# Patient Record
Sex: FEMALE | Race: WHITE | NOT HISPANIC OR LATINO | Employment: FULL TIME | URBAN - METROPOLITAN AREA
[De-identification: names, ages, dates, MRNs, and addresses within clinical notes are randomized per-mention and may not be internally consistent; named-entity substitution may affect disease eponyms.]

---

## 2017-03-31 ENCOUNTER — APPOINTMENT (OUTPATIENT)
Dept: GENERAL RADIOLOGY | Facility: HOSPITAL | Age: 67
End: 2017-03-31

## 2017-03-31 ENCOUNTER — APPOINTMENT (OUTPATIENT)
Dept: CARDIOLOGY | Facility: HOSPITAL | Age: 67
End: 2017-03-31
Attending: INTERNAL MEDICINE

## 2017-03-31 ENCOUNTER — HOSPITAL ENCOUNTER (INPATIENT)
Facility: HOSPITAL | Age: 67
LOS: 18 days | Discharge: HOME HEALTH | End: 2017-04-18
Attending: EMERGENCY MEDICINE | Admitting: INTERNAL MEDICINE

## 2017-03-31 ENCOUNTER — APPOINTMENT (OUTPATIENT)
Dept: CT IMAGING | Facility: HOSPITAL | Age: 67
End: 2017-03-31

## 2017-03-31 ENCOUNTER — APPOINTMENT (OUTPATIENT)
Dept: GENERAL RADIOLOGY | Facility: HOSPITAL | Age: 67
End: 2017-03-31
Attending: INTERNAL MEDICINE

## 2017-03-31 DIAGNOSIS — A41.9 SEPSIS, DUE TO UNSPECIFIED ORGANISM: Primary | ICD-10-CM

## 2017-03-31 DIAGNOSIS — R09.02 HYPOXIA: ICD-10-CM

## 2017-03-31 DIAGNOSIS — R53.1 GENERALIZED WEAKNESS: ICD-10-CM

## 2017-03-31 DIAGNOSIS — J96.02 ACUTE RESPIRATORY FAILURE WITH HYPOXIA AND HYPERCAPNIA (HCC): ICD-10-CM

## 2017-03-31 DIAGNOSIS — J18.9 PNEUMONIA OF RIGHT LOWER LOBE DUE TO INFECTIOUS ORGANISM: ICD-10-CM

## 2017-03-31 DIAGNOSIS — Q76.1 CERVICAL FUSION SYNDROME: ICD-10-CM

## 2017-03-31 DIAGNOSIS — D72.829 LEUKOCYTOSIS, UNSPECIFIED TYPE: ICD-10-CM

## 2017-03-31 DIAGNOSIS — J96.01 ACUTE RESPIRATORY FAILURE WITH HYPOXIA AND HYPERCAPNIA (HCC): ICD-10-CM

## 2017-03-31 LAB
ALBUMIN SERPL-MCNC: 3.5 G/DL (ref 3.5–5.2)
ALBUMIN/GLOB SERPL: 0.7 G/DL
ALP SERPL-CCNC: 113 U/L (ref 39–117)
ALT SERPL W P-5'-P-CCNC: 17 U/L (ref 1–33)
ANION GAP SERPL CALCULATED.3IONS-SCNC: 19.5 MMOL/L
ARTERIAL PATENCY WRIST A: ABNORMAL
ARTERIAL PATENCY WRIST A: ABNORMAL
ARTERIAL PATENCY WRIST A: POSITIVE
AST SERPL-CCNC: 29 U/L (ref 1–32)
ATMOSPHERIC PRESS: 742.2 MMHG
ATMOSPHERIC PRESS: 747.9 MMHG
ATMOSPHERIC PRESS: 748.7 MMHG
B PERT DNA SPEC QL NAA+PROBE: NOT DETECTED
BACTERIA UR QL AUTO: ABNORMAL /HPF
BASE EXCESS BLDA CALC-SCNC: -2 MMOL/L (ref 0–2)
BASE EXCESS BLDA CALC-SCNC: -6.5 MMOL/L (ref 0–2)
BASE EXCESS BLDA CALC-SCNC: -9.3 MMOL/L (ref 0–2)
BDY SITE: ABNORMAL
BILIRUB SERPL-MCNC: 1 MG/DL (ref 0.1–1.2)
BILIRUB UR QL STRIP: NEGATIVE
BUN BLD-MCNC: 27 MG/DL (ref 8–23)
BUN/CREAT SERPL: 22.5 (ref 7–25)
BURR CELLS BLD QL SMEAR: ABNORMAL
C PNEUM DNA NPH QL NAA+NON-PROBE: NOT DETECTED
CALCIUM SPEC-SCNC: 9.4 MG/DL (ref 8.6–10.5)
CHLORIDE SERPL-SCNC: 86 MMOL/L (ref 98–107)
CLARITY UR: CLEAR
CO2 SERPL-SCNC: 24.5 MMOL/L (ref 22–29)
COLOR UR: YELLOW
CORTIS SERPL-MCNC: 46.07 MCG/DL
CREAT BLD-MCNC: 1.2 MG/DL (ref 0.57–1)
D-LACTATE SERPL-SCNC: 2.3 MMOL/L (ref 0.5–2)
D-LACTATE SERPL-SCNC: 2.6 MMOL/L (ref 0.5–2)
D-LACTATE SERPL-SCNC: 5.1 MMOL/L (ref 0.5–2)
DEPRECATED RDW RBC AUTO: 46.4 FL (ref 37–54)
ERYTHROCYTE [DISTWIDTH] IN BLOOD BY AUTOMATED COUNT: 15 % (ref 11.7–13)
FLUAV H1 2009 PAND RNA NPH QL NAA+PROBE: NOT DETECTED
FLUAV H1 HA GENE NPH QL NAA+PROBE: NOT DETECTED
FLUAV H3 RNA NPH QL NAA+PROBE: NOT DETECTED
FLUAV SUBTYP SPEC NAA+PROBE: NOT DETECTED
FLUBV RNA ISLT QL NAA+PROBE: NOT DETECTED
GFR SERPL CREATININE-BSD FRML MDRD: 45 ML/MIN/1.73
GLOBULIN UR ELPH-MCNC: 4.7 GM/DL
GLUCOSE BLD-MCNC: 186 MG/DL (ref 65–99)
GLUCOSE BLDC GLUCOMTR-MCNC: 158 MG/DL (ref 70–130)
GLUCOSE BLDC GLUCOMTR-MCNC: 171 MG/DL (ref 70–130)
GLUCOSE UR STRIP-MCNC: NEGATIVE MG/DL
HADV DNA SPEC NAA+PROBE: NOT DETECTED
HCO3 BLDA-SCNC: 18.9 MMOL/L (ref 22–28)
HCO3 BLDA-SCNC: 19.1 MMOL/L (ref 22–28)
HCO3 BLDA-SCNC: 28.4 MMOL/L (ref 22–28)
HCOV 229E RNA SPEC QL NAA+PROBE: NOT DETECTED
HCOV HKU1 RNA SPEC QL NAA+PROBE: NOT DETECTED
HCOV NL63 RNA SPEC QL NAA+PROBE: NOT DETECTED
HCOV OC43 RNA SPEC QL NAA+PROBE: NOT DETECTED
HCT VFR BLD AUTO: 41.9 % (ref 35.6–45.5)
HGB BLD-MCNC: 13.1 G/DL (ref 11.9–15.5)
HGB UR QL STRIP.AUTO: ABNORMAL
HMPV RNA NPH QL NAA+NON-PROBE: NOT DETECTED
HOLD SPECIMEN: NORMAL
HOROWITZ INDEX BLD+IHG-RTO: 100 %
HPIV1 RNA SPEC QL NAA+PROBE: NOT DETECTED
HPIV2 RNA SPEC QL NAA+PROBE: NOT DETECTED
HPIV3 RNA NPH QL NAA+PROBE: NOT DETECTED
HPIV4 P GENE NPH QL NAA+PROBE: NOT DETECTED
HYALINE CASTS UR QL AUTO: ABNORMAL /LPF
KETONES UR QL STRIP: NEGATIVE
L PNEUMO1 AG UR QL IA: NEGATIVE
LEUKOCYTE ESTERASE UR QL STRIP.AUTO: NEGATIVE
LYMPHOCYTES # BLD MANUAL: 1.56 10*3/MM3 (ref 0.9–4.8)
LYMPHOCYTES NFR BLD MANUAL: 4 % (ref 19.6–45.3)
LYMPHOCYTES NFR BLD MANUAL: 6 % (ref 5–12)
M PNEUMO IGG SER IA-ACNC: NOT DETECTED
MCH RBC QN AUTO: 26.4 PG (ref 26.9–32)
MCHC RBC AUTO-ENTMCNC: 31.3 G/DL (ref 32.4–36.3)
MCV RBC AUTO: 84.3 FL (ref 80.5–98.2)
MODALITY: ABNORMAL
MONOCYTES # BLD AUTO: 2.34 10*3/MM3 (ref 0.2–1.2)
NEUTROPHILS # BLD AUTO: 35.03 10*3/MM3 (ref 1.9–8.1)
NEUTROPHILS NFR BLD MANUAL: 90 % (ref 42.7–76)
NITRITE UR QL STRIP: NEGATIVE
NT-PROBNP SERPL-MCNC: 2506 PG/ML (ref 0–900)
O2 A-A PPRESDIFF RESPIRATORY: 0.1 MMHG
O2 A-A PPRESDIFF RESPIRATORY: 0.1 MMHG
O2 A-A PPRESDIFF RESPIRATORY: 0.3 MMHG
PCO2 BLDA: 37.3 MM HG (ref 35–45)
PCO2 BLDA: 48.1 MM HG (ref 35–45)
PCO2 BLDA: 73.4 MM HG (ref 35–45)
PEEP RESPIRATORY: 10 CM[H2O]
PEEP RESPIRATORY: 5 CM[H2O]
PH BLDA: 7.2 PH UNITS (ref 7.35–7.45)
PH BLDA: 7.2 PH UNITS (ref 7.35–7.45)
PH BLDA: 7.32 PH UNITS (ref 7.35–7.45)
PH UR STRIP.AUTO: <=5 [PH] (ref 5–8)
PLAT MORPH BLD: NORMAL
PLATELET # BLD AUTO: 261 10*3/MM3 (ref 140–500)
PMV BLD AUTO: 10.9 FL (ref 6–12)
PO2 BLDA: 209 MM HG (ref 80–100)
PO2 BLDA: 58.2 MM HG (ref 80–100)
PO2 BLDA: 65.9 MM HG (ref 80–100)
POTASSIUM BLD-SCNC: 3.5 MMOL/L (ref 3.5–5.2)
PROCALCITONIN SERPL-MCNC: 13.78 NG/ML (ref 0.1–0.25)
PROT SERPL-MCNC: 8.2 G/DL (ref 6–8.5)
PROT UR QL STRIP: ABNORMAL
RBC # BLD AUTO: 4.97 10*6/MM3 (ref 3.9–5.2)
RBC # UR: ABNORMAL /HPF
REF LAB TEST METHOD: ABNORMAL
RHINOVIRUS RNA SPEC NAA+PROBE: NOT DETECTED
RSV RNA NPH QL NAA+NON-PROBE: NOT DETECTED
S PNEUM AG SPEC QL LA: NEGATIVE
SAO2 % BLDCOA: 83.1 % (ref 92–99)
SAO2 % BLDCOA: 86.4 % (ref 92–99)
SAO2 % BLDCOA: 99.7 % (ref 92–99)
SCAN SLIDE: NORMAL
SET MECH RESP RATE: 30
SET MECH RESP RATE: 32
SODIUM BLD-SCNC: 130 MMOL/L (ref 136–145)
SP GR UR STRIP: >=1.03 (ref 1–1.03)
SQUAMOUS #/AREA URNS HPF: ABNORMAL /HPF
TOTAL RATE: 18 BREATHS/MINUTE
TOTAL RATE: 30 BREATHS/MINUTE
TOTAL RATE: 32 BREATHS/MINUTE
TROPONIN T SERPL-MCNC: 0.02 NG/ML (ref 0–0.03)
TROPONIN T SERPL-MCNC: 0.06 NG/ML (ref 0–0.03)
UROBILINOGEN UR QL STRIP: ABNORMAL
VENTILATOR MODE: ABNORMAL
VENTILATOR MODE: AC
VT ON VENT VENT: 320 ML
VT ON VENT VENT: 320 ML
WBC MORPH BLD: NORMAL
WBC NRBC COR # BLD: 38.92 10*3/MM3 (ref 4.5–10.7)
WBC UR QL AUTO: ABNORMAL /HPF
WHOLE BLOOD HOLD SPECIMEN: NORMAL

## 2017-03-31 PROCEDURE — 36600 WITHDRAWAL OF ARTERIAL BLOOD: CPT

## 2017-03-31 PROCEDURE — 93005 ELECTROCARDIOGRAM TRACING: CPT | Performed by: EMERGENCY MEDICINE

## 2017-03-31 PROCEDURE — 71010 HC CHEST PA OR AP: CPT

## 2017-03-31 PROCEDURE — 0BH17EZ INSERTION OF ENDOTRACHEAL AIRWAY INTO TRACHEA, VIA NATURAL OR ARTIFICIAL OPENING: ICD-10-PCS | Performed by: INTERNAL MEDICINE

## 2017-03-31 PROCEDURE — 87486 CHLMYD PNEUM DNA AMP PROBE: CPT | Performed by: INTERNAL MEDICINE

## 2017-03-31 PROCEDURE — 36415 COLL VENOUS BLD VENIPUNCTURE: CPT | Performed by: EMERGENCY MEDICINE

## 2017-03-31 PROCEDURE — 71250 CT THORAX DX C-: CPT

## 2017-03-31 PROCEDURE — 25010000002 PROPOFOL 10 MG/ML EMULSION

## 2017-03-31 PROCEDURE — 87040 BLOOD CULTURE FOR BACTERIA: CPT | Performed by: EMERGENCY MEDICINE

## 2017-03-31 PROCEDURE — 80053 COMPREHEN METABOLIC PANEL: CPT | Performed by: EMERGENCY MEDICINE

## 2017-03-31 PROCEDURE — 02HV33Z INSERTION OF INFUSION DEVICE INTO SUPERIOR VENA CAVA, PERCUTANEOUS APPROACH: ICD-10-PCS | Performed by: INTERNAL MEDICINE

## 2017-03-31 PROCEDURE — 94799 UNLISTED PULMONARY SVC/PX: CPT

## 2017-03-31 PROCEDURE — 94002 VENT MGMT INPAT INIT DAY: CPT

## 2017-03-31 PROCEDURE — 25010000003 CEFTRIAXONE PER 250 MG: Performed by: EMERGENCY MEDICINE

## 2017-03-31 PROCEDURE — 81001 URINALYSIS AUTO W/SCOPE: CPT | Performed by: INTERNAL MEDICINE

## 2017-03-31 PROCEDURE — 99285 EMERGENCY DEPT VISIT HI MDM: CPT

## 2017-03-31 PROCEDURE — 83605 ASSAY OF LACTIC ACID: CPT | Performed by: EMERGENCY MEDICINE

## 2017-03-31 PROCEDURE — 94660 CPAP INITIATION&MGMT: CPT

## 2017-03-31 PROCEDURE — 82803 BLOOD GASES ANY COMBINATION: CPT

## 2017-03-31 PROCEDURE — 85007 BL SMEAR W/DIFF WBC COUNT: CPT | Performed by: EMERGENCY MEDICINE

## 2017-03-31 PROCEDURE — 93306 TTE W/DOPPLER COMPLETE: CPT | Performed by: INTERNAL MEDICINE

## 2017-03-31 PROCEDURE — 82962 GLUCOSE BLOOD TEST: CPT

## 2017-03-31 PROCEDURE — 82533 TOTAL CORTISOL: CPT | Performed by: INTERNAL MEDICINE

## 2017-03-31 PROCEDURE — 25010000002 FENTANYL CITRATE (PF) 100 MCG/2ML SOLUTION

## 2017-03-31 PROCEDURE — 87449 NOS EACH ORGANISM AG IA: CPT | Performed by: INTERNAL MEDICINE

## 2017-03-31 PROCEDURE — 85025 COMPLETE CBC W/AUTO DIFF WBC: CPT | Performed by: EMERGENCY MEDICINE

## 2017-03-31 PROCEDURE — 84484 ASSAY OF TROPONIN QUANT: CPT | Performed by: EMERGENCY MEDICINE

## 2017-03-31 PROCEDURE — 0399T HC MYOCARDL STRAIN IMAG QUAN ASSMT PER SESS: CPT

## 2017-03-31 PROCEDURE — 93010 ELECTROCARDIOGRAM REPORT: CPT | Performed by: INTERNAL MEDICINE

## 2017-03-31 PROCEDURE — 84484 ASSAY OF TROPONIN QUANT: CPT | Performed by: INTERNAL MEDICINE

## 2017-03-31 PROCEDURE — 25010000002 VANCOMYCIN: Performed by: INTERNAL MEDICINE

## 2017-03-31 PROCEDURE — 84145 PROCALCITONIN (PCT): CPT | Performed by: INTERNAL MEDICINE

## 2017-03-31 PROCEDURE — B548ZZA ULTRASONOGRAPHY OF SUPERIOR VENA CAVA, GUIDANCE: ICD-10-PCS | Performed by: INTERNAL MEDICINE

## 2017-03-31 PROCEDURE — 87081 CULTURE SCREEN ONLY: CPT | Performed by: INTERNAL MEDICINE

## 2017-03-31 PROCEDURE — 83605 ASSAY OF LACTIC ACID: CPT | Performed by: INTERNAL MEDICINE

## 2017-03-31 PROCEDURE — 87633 RESP VIRUS 12-25 TARGETS: CPT | Performed by: INTERNAL MEDICINE

## 2017-03-31 PROCEDURE — 0399T ADULT TRANSTHORACIC ECHO COMPLETE: CPT | Performed by: INTERNAL MEDICINE

## 2017-03-31 PROCEDURE — 83880 ASSAY OF NATRIURETIC PEPTIDE: CPT | Performed by: EMERGENCY MEDICINE

## 2017-03-31 PROCEDURE — 87581 M.PNEUMON DNA AMP PROBE: CPT | Performed by: INTERNAL MEDICINE

## 2017-03-31 PROCEDURE — 25010000002 PIPERACILLIN SOD-TAZOBACTAM PER 1 G: Performed by: INTERNAL MEDICINE

## 2017-03-31 PROCEDURE — 93306 TTE W/DOPPLER COMPLETE: CPT

## 2017-03-31 PROCEDURE — 5A1955Z RESPIRATORY VENTILATION, GREATER THAN 96 CONSECUTIVE HOURS: ICD-10-PCS | Performed by: INTERNAL MEDICINE

## 2017-03-31 PROCEDURE — 94640 AIRWAY INHALATION TREATMENT: CPT

## 2017-03-31 PROCEDURE — 87798 DETECT AGENT NOS DNA AMP: CPT | Performed by: INTERNAL MEDICINE

## 2017-03-31 PROCEDURE — 87899 AGENT NOS ASSAY W/OPTIC: CPT | Performed by: INTERNAL MEDICINE

## 2017-03-31 PROCEDURE — 99223 1ST HOSP IP/OBS HIGH 75: CPT | Performed by: INTERNAL MEDICINE

## 2017-03-31 PROCEDURE — 25010000002 ENOXAPARIN PER 10 MG: Performed by: INTERNAL MEDICINE

## 2017-03-31 RX ORDER — CEFTRIAXONE SODIUM 1 G/50ML
1 INJECTION, SOLUTION INTRAVENOUS ONCE
Status: COMPLETED | OUTPATIENT
Start: 2017-03-31 | End: 2017-03-31

## 2017-03-31 RX ORDER — MAGNESIUM SULFATE HEPTAHYDRATE 40 MG/ML
2 INJECTION, SOLUTION INTRAVENOUS AS NEEDED
Status: DISCONTINUED | OUTPATIENT
Start: 2017-03-31 | End: 2017-04-08

## 2017-03-31 RX ORDER — SODIUM CHLORIDE 0.9 % (FLUSH) 0.9 %
1-10 SYRINGE (ML) INJECTION AS NEEDED
Status: DISCONTINUED | OUTPATIENT
Start: 2017-03-31 | End: 2017-04-08

## 2017-03-31 RX ORDER — POTASSIUM CHLORIDE 1.5 G/1.77G
40 POWDER, FOR SOLUTION ORAL AS NEEDED
Status: DISCONTINUED | OUTPATIENT
Start: 2017-03-31 | End: 2017-04-08

## 2017-03-31 RX ORDER — POTASSIUM CHLORIDE 7.45 MG/ML
10 INJECTION INTRAVENOUS
Status: DISCONTINUED | OUTPATIENT
Start: 2017-03-31 | End: 2017-04-08

## 2017-03-31 RX ORDER — PROPOFOL 10 MG/ML
VIAL (ML) INTRAVENOUS
Status: COMPLETED
Start: 2017-03-31 | End: 2017-03-31

## 2017-03-31 RX ORDER — FAMOTIDINE 10 MG/ML
20 INJECTION, SOLUTION INTRAVENOUS 2 TIMES DAILY
Status: DISCONTINUED | OUTPATIENT
Start: 2017-03-31 | End: 2017-04-02

## 2017-03-31 RX ORDER — CHLORHEXIDINE GLUCONATE 0.12 MG/ML
15 RINSE ORAL EVERY 12 HOURS SCHEDULED
Status: DISCONTINUED | OUTPATIENT
Start: 2017-03-31 | End: 2017-04-08

## 2017-03-31 RX ORDER — FENTANYL CITRATE 50 UG/ML
INJECTION, SOLUTION INTRAMUSCULAR; INTRAVENOUS
Status: COMPLETED
Start: 2017-03-31 | End: 2017-03-31

## 2017-03-31 RX ORDER — ACETAMINOPHEN 500 MG
1000 TABLET ORAL ONCE
Status: COMPLETED | OUTPATIENT
Start: 2017-03-31 | End: 2017-03-31

## 2017-03-31 RX ORDER — ALBUTEROL SULFATE 2.5 MG/3ML
2.5 SOLUTION RESPIRATORY (INHALATION)
Status: COMPLETED | OUTPATIENT
Start: 2017-03-31 | End: 2017-03-31

## 2017-03-31 RX ORDER — FENTANYL CITRATE 50 UG/ML
75 INJECTION, SOLUTION INTRAMUSCULAR; INTRAVENOUS
Status: DISCONTINUED | OUTPATIENT
Start: 2017-03-31 | End: 2017-04-07

## 2017-03-31 RX ORDER — FENTANYL CITRATE 50 UG/ML
50 INJECTION, SOLUTION INTRAMUSCULAR; INTRAVENOUS
Status: DISCONTINUED | OUTPATIENT
Start: 2017-03-31 | End: 2017-04-08

## 2017-03-31 RX ORDER — MAGNESIUM SULFATE HEPTAHYDRATE 40 MG/ML
4 INJECTION, SOLUTION INTRAVENOUS AS NEEDED
Status: DISCONTINUED | OUTPATIENT
Start: 2017-03-31 | End: 2017-04-08

## 2017-03-31 RX ORDER — FENTANYL CITRATE 50 UG/ML
25 INJECTION, SOLUTION INTRAMUSCULAR; INTRAVENOUS
Status: DISCONTINUED | OUTPATIENT
Start: 2017-03-31 | End: 2017-04-08

## 2017-03-31 RX ORDER — POTASSIUM CHLORIDE 750 MG/1
40 CAPSULE, EXTENDED RELEASE ORAL AS NEEDED
Status: DISCONTINUED | OUTPATIENT
Start: 2017-03-31 | End: 2017-04-08

## 2017-03-31 RX ORDER — SODIUM CHLORIDE 0.9 % (FLUSH) 0.9 %
10 SYRINGE (ML) INJECTION AS NEEDED
Status: DISCONTINUED | OUTPATIENT
Start: 2017-03-31 | End: 2017-04-08

## 2017-03-31 RX ORDER — IPRATROPIUM BROMIDE AND ALBUTEROL SULFATE 2.5; .5 MG/3ML; MG/3ML
3 SOLUTION RESPIRATORY (INHALATION) ONCE
Status: COMPLETED | OUTPATIENT
Start: 2017-03-31 | End: 2017-03-31

## 2017-03-31 RX ORDER — SODIUM CHLORIDE, SODIUM LACTATE, POTASSIUM CHLORIDE, CALCIUM CHLORIDE 600; 310; 30; 20 MG/100ML; MG/100ML; MG/100ML; MG/100ML
125 INJECTION, SOLUTION INTRAVENOUS CONTINUOUS
Status: DISCONTINUED | OUTPATIENT
Start: 2017-03-31 | End: 2017-04-02

## 2017-03-31 RX ORDER — FENTANYL CITRATE 50 UG/ML
50 INJECTION, SOLUTION INTRAMUSCULAR; INTRAVENOUS
Status: DISCONTINUED | OUTPATIENT
Start: 2017-03-31 | End: 2017-03-31

## 2017-03-31 RX ORDER — ONDANSETRON 2 MG/ML
4 INJECTION INTRAMUSCULAR; INTRAVENOUS ONCE
Status: COMPLETED | OUTPATIENT
Start: 2017-03-31 | End: 2017-04-02

## 2017-03-31 RX ADMIN — VANCOMYCIN HYDROCHLORIDE 1500 MG: 1 INJECTION, POWDER, LYOPHILIZED, FOR SOLUTION INTRAVENOUS at 21:42

## 2017-03-31 RX ADMIN — CHLORHEXIDINE GLUCONATE 15 ML: 1.2 RINSE ORAL at 21:08

## 2017-03-31 RX ADMIN — PROPOFOL 30 MCG/KG/MIN: 10 INJECTION, EMULSION INTRAVENOUS at 15:35

## 2017-03-31 RX ADMIN — CEFTRIAXONE SODIUM 1 G: 1 INJECTION, SOLUTION INTRAVENOUS at 12:47

## 2017-03-31 RX ADMIN — ALBUTEROL SULFATE 2.5 MG: 2.5 SOLUTION RESPIRATORY (INHALATION) at 12:59

## 2017-03-31 RX ADMIN — FENTANYL CITRATE 50 MCG: 50 INJECTION INTRAMUSCULAR; INTRAVENOUS at 18:00

## 2017-03-31 RX ADMIN — PROPOFOL 40 MCG/KG/MIN: 10 INJECTION, EMULSION INTRAVENOUS at 16:30

## 2017-03-31 RX ADMIN — IPRATROPIUM BROMIDE AND ALBUTEROL SULFATE 3 ML: .5; 3 SOLUTION RESPIRATORY (INHALATION) at 13:00

## 2017-03-31 RX ADMIN — TAZOBACTAM SODIUM AND PIPERACILLIN SODIUM 4.5 G: 500; 4 INJECTION, SOLUTION INTRAVENOUS at 20:20

## 2017-03-31 RX ADMIN — ENOXAPARIN SODIUM 40 MG: 40 INJECTION SUBCUTANEOUS at 20:36

## 2017-03-31 RX ADMIN — ACETAMINOPHEN 1000 MG: 500 TABLET ORAL at 12:40

## 2017-03-31 RX ADMIN — SODIUM CHLORIDE 1000 ML: 9 INJECTION, SOLUTION INTRAVENOUS at 14:22

## 2017-03-31 RX ADMIN — PHENYLEPHRINE HYDROCHLORIDE 0.5 MCG/KG/MIN: 10 INJECTION INTRAVENOUS at 14:40

## 2017-03-31 RX ADMIN — SODIUM CHLORIDE, POTASSIUM CHLORIDE, SODIUM LACTATE AND CALCIUM CHLORIDE 125 ML/HR: 600; 310; 30; 20 INJECTION, SOLUTION INTRAVENOUS at 19:30

## 2017-03-31 RX ADMIN — AZITHROMYCIN DIHYDRATE 500 MG: 500 INJECTION, POWDER, LYOPHILIZED, FOR SOLUTION INTRAVENOUS at 13:36

## 2017-03-31 RX ADMIN — FAMOTIDINE 20 MG: 10 INJECTION, SOLUTION INTRAVENOUS at 22:28

## 2017-03-31 RX ADMIN — Medication 0.04 MCG/KG/MIN: at 18:40

## 2017-03-31 RX ADMIN — SODIUM CHLORIDE 1000 ML: 9 INJECTION, SOLUTION INTRAVENOUS at 12:44

## 2017-03-31 RX ADMIN — ALBUTEROL SULFATE 2.5 MG: 2.5 SOLUTION RESPIRATORY (INHALATION) at 12:58

## 2017-04-01 ENCOUNTER — APPOINTMENT (OUTPATIENT)
Dept: GENERAL RADIOLOGY | Facility: HOSPITAL | Age: 67
End: 2017-04-01

## 2017-04-01 LAB
ALBUMIN SERPL-MCNC: 2.1 G/DL (ref 3.5–5.2)
ALBUMIN/GLOB SERPL: 0.6 G/DL
ALP SERPL-CCNC: 70 U/L (ref 39–117)
ALT SERPL W P-5'-P-CCNC: 192 U/L (ref 1–33)
ANION GAP SERPL CALCULATED.3IONS-SCNC: 15.3 MMOL/L
ARTERIAL PATENCY WRIST A: POSITIVE
AST SERPL-CCNC: 247 U/L (ref 1–32)
ATMOSPHERIC PRESS: 751.5 MMHG
BASE EXCESS BLDA CALC-SCNC: -1.9 MMOL/L (ref 0–2)
BASOPHILS # BLD AUTO: 0.01 10*3/MM3 (ref 0–0.2)
BASOPHILS NFR BLD AUTO: 0 % (ref 0–1.5)
BDY SITE: ABNORMAL
BH CV ECHO MEAS - AO MAX PG (FULL): 7 MMHG
BH CV ECHO MEAS - AO MAX PG: 6.9 MMHG
BH CV ECHO MEAS - AO MEAN PG (FULL): 2 MMHG
BH CV ECHO MEAS - AO MEAN PG: 3 MMHG
BH CV ECHO MEAS - AO ROOT AREA (BSA CORRECTED): 1.9
BH CV ECHO MEAS - AO ROOT AREA: 8.6 CM^2
BH CV ECHO MEAS - AO ROOT DIAM: 3.3 CM
BH CV ECHO MEAS - AO V2 MAX: 131 CM/SEC
BH CV ECHO MEAS - AO V2 MEAN: 85.9 CM/SEC
BH CV ECHO MEAS - AO V2 VTI: 20.5 CM
BH CV ECHO MEAS - AVA(I,A): 1.8 CM^2
BH CV ECHO MEAS - AVA(I,D): 1.8 CM^2
BH CV ECHO MEAS - AVA(V,A): 2 CM^2
BH CV ECHO MEAS - AVA(V,D): 2 CM^2
BH CV ECHO MEAS - BSA(HAYCOCK): 1.8 M^2
BH CV ECHO MEAS - BSA: 1.7 M^2
BH CV ECHO MEAS - BZI_BMI: 33.3 KILOGRAMS/M^2
BH CV ECHO MEAS - BZI_METRIC_HEIGHT: 149.9 CM
BH CV ECHO MEAS - BZI_METRIC_WEIGHT: 74.8 KG
BH CV ECHO MEAS - CONTRAST EF (2CH): 50 ML/M^2
BH CV ECHO MEAS - CONTRAST EF 4CH: 50 ML/M^2
BH CV ECHO MEAS - EDV(CUBED): 59.3 ML
BH CV ECHO MEAS - EDV(MOD-SP2): 84 ML
BH CV ECHO MEAS - EDV(MOD-SP4): 80 ML
BH CV ECHO MEAS - EDV(TEICH): 65.9 ML
BH CV ECHO MEAS - EF(CUBED): 49.8 %
BH CV ECHO MEAS - EF(MOD-SP2): 50 %
BH CV ECHO MEAS - EF(MOD-SP4): 50 %
BH CV ECHO MEAS - EF(TEICH): 42.5 %
BH CV ECHO MEAS - ESV(CUBED): 29.8 ML
BH CV ECHO MEAS - ESV(MOD-SP2): 42 ML
BH CV ECHO MEAS - ESV(MOD-SP4): 40 ML
BH CV ECHO MEAS - ESV(TEICH): 37.9 ML
BH CV ECHO MEAS - FS: 20.5 %
BH CV ECHO MEAS - IVS/LVPW: 1
BH CV ECHO MEAS - IVSD: 0.9 CM
BH CV ECHO MEAS - LAT PEAK E' VEL: 6 CM/SEC
BH CV ECHO MEAS - LV DIASTOLIC VOL/BSA (35-75): 47.1 ML/M^2
BH CV ECHO MEAS - LV MASS(C)D: 105.3 GRAMS
BH CV ECHO MEAS - LV MASS(C)DI: 62 GRAMS/M^2
BH CV ECHO MEAS - LV MAX PG: 2.4 MMHG
BH CV ECHO MEAS - LV MEAN PG: 1 MMHG
BH CV ECHO MEAS - LV SYSTOLIC VOL/BSA (12-30): 23.5 ML/M^2
BH CV ECHO MEAS - LV V1 MAX: 77.4 CM/SEC
BH CV ECHO MEAS - LV V1 MEAN: 52.6 CM/SEC
BH CV ECHO MEAS - LV V1 VTI: 10.8 CM
BH CV ECHO MEAS - LVIDD: 3.9 CM
BH CV ECHO MEAS - LVIDS: 3.1 CM
BH CV ECHO MEAS - LVLD AP2: 8 CM
BH CV ECHO MEAS - LVLD AP4: 7.9 CM
BH CV ECHO MEAS - LVLS AP2: 7.2 CM
BH CV ECHO MEAS - LVLS AP4: 7.1 CM
BH CV ECHO MEAS - LVOT AREA (M): 3.5 CM^2
BH CV ECHO MEAS - LVOT AREA: 3.5 CM^2
BH CV ECHO MEAS - LVOT DIAM: 2.1 CM
BH CV ECHO MEAS - LVPWD: 0.9 CM
BH CV ECHO MEAS - MED PEAK E' VEL: 5 CM/SEC
BH CV ECHO MEAS - MV A DUR: 0.15 SEC
BH CV ECHO MEAS - MV A MAX VEL: 93.1 CM/SEC
BH CV ECHO MEAS - MV DEC SLOPE: 417.5 CM/SEC^2
BH CV ECHO MEAS - MV DEC TIME: 0.17 SEC
BH CV ECHO MEAS - MV E MAX VEL: 89.7 CM/SEC
BH CV ECHO MEAS - MV E/A: 0.96
BH CV ECHO MEAS - MV MAX PG: 3.6 MMHG
BH CV ECHO MEAS - MV MEAN PG: 2 MMHG
BH CV ECHO MEAS - MV P1/2T MAX VEL: 92.6 CM/SEC
BH CV ECHO MEAS - MV P1/2T: 65 MSEC
BH CV ECHO MEAS - MV V2 MAX: 95.4 CM/SEC
BH CV ECHO MEAS - MV V2 MEAN: 56.9 CM/SEC
BH CV ECHO MEAS - MV V2 VTI: 30.1 CM
BH CV ECHO MEAS - MVA P1/2T LCG: 2.4 CM^2
BH CV ECHO MEAS - MVA(P1/2T): 3.4 CM^2
BH CV ECHO MEAS - MVA(VTI): 1.2 CM^2
BH CV ECHO MEAS - PA ACC TIME: 0.09 SEC
BH CV ECHO MEAS - PA MAX PG (FULL): 0.07 MMHG
BH CV ECHO MEAS - PA MAX PG: 1.4 MMHG
BH CV ECHO MEAS - PA PR(ACCEL): 37.6 MMHG
BH CV ECHO MEAS - PA V2 MAX: 58.5 CM/SEC
BH CV ECHO MEAS - PULM A REVS DUR: 0.16 SEC
BH CV ECHO MEAS - PULM A REVS VEL: 29.7 CM/SEC
BH CV ECHO MEAS - PULM DIAS VEL: 42.5 CM/SEC
BH CV ECHO MEAS - PULM S/D: 0.61
BH CV ECHO MEAS - PULM SYS VEL: 26 CM/SEC
BH CV ECHO MEAS - PVA(V,A): 4.4 CM^2
BH CV ECHO MEAS - PVA(V,D): 4.4 CM^2
BH CV ECHO MEAS - QP/QS: 0.98
BH CV ECHO MEAS - RAP SYSTOLE: 8 MMHG
BH CV ECHO MEAS - RV MAX PG: 1.3 MMHG
BH CV ECHO MEAS - RV MEAN PG: 1 MMHG
BH CV ECHO MEAS - RV V1 MAX: 56.9 CM/SEC
BH CV ECHO MEAS - RV V1 MEAN: 34.6 CM/SEC
BH CV ECHO MEAS - RV V1 VTI: 8.1 CM
BH CV ECHO MEAS - RVOT AREA: 4.5 CM^2
BH CV ECHO MEAS - RVOT DIAM: 2.4 CM
BH CV ECHO MEAS - RVSP: 33.8 MMHG
BH CV ECHO MEAS - SI(AO): 103.2 ML/M^2
BH CV ECHO MEAS - SI(CUBED): 17.4 ML/M^2
BH CV ECHO MEAS - SI(LVOT): 22 ML/M^2
BH CV ECHO MEAS - SI(MOD-SP2): 24.7 ML/M^2
BH CV ECHO MEAS - SI(MOD-SP4): 23.5 ML/M^2
BH CV ECHO MEAS - SI(TEICH): 16.5 ML/M^2
BH CV ECHO MEAS - SV(AO): 175.3 ML
BH CV ECHO MEAS - SV(CUBED): 29.5 ML
BH CV ECHO MEAS - SV(LVOT): 37.4 ML
BH CV ECHO MEAS - SV(MOD-SP2): 42 ML
BH CV ECHO MEAS - SV(MOD-SP4): 40 ML
BH CV ECHO MEAS - SV(RVOT): 36.8 ML
BH CV ECHO MEAS - SV(TEICH): 28 ML
BH CV ECHO MEAS - TAPSE (>1.6): 1.2 CM2
BH CV ECHO MEAS - TR MAX VEL: 254 CM/SEC
BH CV XLRA - RV BASE: 3.2 CM
BH CV XLRA - RV LENGTH: 7.2 CM
BH CV XLRA - RV MID: 2.2 CM
BH CV XLRA - TDI S': 11 CM/SEC
BILIRUB SERPL-MCNC: 0.8 MG/DL (ref 0.1–1.2)
BUN BLD-MCNC: 28 MG/DL (ref 8–23)
BUN/CREAT SERPL: 23 (ref 7–25)
CA-I BLD-MCNC: 4.6 MG/DL (ref 4.6–5.4)
CA-I SERPL ISE-MCNC: 1.15 MMOL/L (ref 1.1–1.35)
CALCIUM SPEC-SCNC: 7.9 MG/DL (ref 8.6–10.5)
CHLORIDE SERPL-SCNC: 98 MMOL/L (ref 98–107)
CO2 SERPL-SCNC: 21.7 MMOL/L (ref 22–29)
CREAT BLD-MCNC: 1.22 MG/DL (ref 0.57–1)
D-LACTATE SERPL-SCNC: 1.6 MMOL/L (ref 0.5–2)
D-LACTATE SERPL-SCNC: 1.9 MMOL/L (ref 0.5–2)
DEPRECATED RDW RBC AUTO: 47.3 FL (ref 37–54)
E/E' RATIO: 16
EOSINOPHIL # BLD AUTO: 0.01 10*3/MM3 (ref 0–0.7)
EOSINOPHIL NFR BLD AUTO: 0 % (ref 0.3–6.2)
ERYTHROCYTE [DISTWIDTH] IN BLOOD BY AUTOMATED COUNT: 15.3 % (ref 11.7–13)
GFR SERPL CREATININE-BSD FRML MDRD: 44 ML/MIN/1.73
GLOBULIN UR ELPH-MCNC: 3.6 GM/DL
GLUCOSE BLD-MCNC: 128 MG/DL (ref 65–99)
GLUCOSE BLDC GLUCOMTR-MCNC: 113 MG/DL (ref 70–130)
GLUCOSE BLDC GLUCOMTR-MCNC: 119 MG/DL (ref 70–130)
GLUCOSE BLDC GLUCOMTR-MCNC: 120 MG/DL (ref 70–130)
HCO3 BLDA-SCNC: 22.6 MMOL/L (ref 22–28)
HCT VFR BLD AUTO: 35.5 % (ref 35.6–45.5)
HGB BLD-MCNC: 11.1 G/DL (ref 11.9–15.5)
HOROWITZ INDEX BLD+IHG-RTO: 50 %
IMM GRANULOCYTES # BLD: 0.39 10*3/MM3 (ref 0–0.03)
IMM GRANULOCYTES NFR BLD: 1.1 % (ref 0–0.5)
LEFT ATRIUM VOLUME INDEX: 23 ML/M2
LEFT ATRIUM VOLUME: 36 CM3
LYMPHOCYTES # BLD AUTO: 2.2 10*3/MM3 (ref 0.9–4.8)
LYMPHOCYTES NFR BLD AUTO: 6.4 % (ref 19.6–45.3)
MAGNESIUM SERPL-MCNC: 1.7 MG/DL (ref 1.6–2.4)
MCH RBC QN AUTO: 26.7 PG (ref 26.9–32)
MCHC RBC AUTO-ENTMCNC: 31.3 G/DL (ref 32.4–36.3)
MCV RBC AUTO: 85.3 FL (ref 80.5–98.2)
MODALITY: ABNORMAL
MONOCYTES # BLD AUTO: 1.8 10*3/MM3 (ref 0.2–1.2)
MONOCYTES NFR BLD AUTO: 5.2 % (ref 5–12)
NEUTROPHILS # BLD AUTO: 30.14 10*3/MM3 (ref 1.9–8.1)
NEUTROPHILS NFR BLD AUTO: 87.3 % (ref 42.7–76)
O2 A-A PPRESDIFF RESPIRATORY: 0.3 MMHG
PCO2 BLDA: 37 MM HG (ref 35–45)
PEEP RESPIRATORY: 12 CM[H2O]
PH BLDA: 7.39 PH UNITS (ref 7.35–7.45)
PHOSPHATE SERPL-MCNC: 2.6 MG/DL (ref 2.5–4.5)
PLATELET # BLD AUTO: 262 10*3/MM3 (ref 140–500)
PMV BLD AUTO: 11.5 FL (ref 6–12)
PO2 BLDA: 109.8 MM HG (ref 80–100)
POTASSIUM BLD-SCNC: 3.7 MMOL/L (ref 3.5–5.2)
PROT SERPL-MCNC: 5.7 G/DL (ref 6–8.5)
RBC # BLD AUTO: 4.16 10*6/MM3 (ref 3.9–5.2)
SAO2 % BLDCOA: 98.3 % (ref 92–99)
SET MECH RESP RATE: 32
SODIUM BLD-SCNC: 135 MMOL/L (ref 136–145)
TOTAL RATE: 32 BREATHS/MINUTE
TROPONIN T SERPL-MCNC: 0.02 NG/ML (ref 0–0.03)
TSH SERPL DL<=0.05 MIU/L-ACNC: 7.08 MIU/ML (ref 0.27–4.2)
VENTILATOR MODE: ABNORMAL
VT ON VENT VENT: 320 ML
WBC NRBC COR # BLD: 34.55 10*3/MM3 (ref 4.5–10.7)

## 2017-04-01 PROCEDURE — 25010000002 ENOXAPARIN PER 10 MG: Performed by: INTERNAL MEDICINE

## 2017-04-01 PROCEDURE — 84443 ASSAY THYROID STIM HORMONE: CPT | Performed by: INTERNAL MEDICINE

## 2017-04-01 PROCEDURE — 94799 UNLISTED PULMONARY SVC/PX: CPT

## 2017-04-01 PROCEDURE — 25010000002 FENTANYL CITRATE (PF) 100 MCG/2ML SOLUTION: Performed by: INTERNAL MEDICINE

## 2017-04-01 PROCEDURE — 25010000002 PIPERACILLIN SOD-TAZOBACTAM PER 1 G: Performed by: INTERNAL MEDICINE

## 2017-04-01 PROCEDURE — 93010 ELECTROCARDIOGRAM REPORT: CPT | Performed by: INTERNAL MEDICINE

## 2017-04-01 PROCEDURE — 80053 COMPREHEN METABOLIC PANEL: CPT | Performed by: INTERNAL MEDICINE

## 2017-04-01 PROCEDURE — 83605 ASSAY OF LACTIC ACID: CPT | Performed by: INTERNAL MEDICINE

## 2017-04-01 PROCEDURE — 84100 ASSAY OF PHOSPHORUS: CPT | Performed by: INTERNAL MEDICINE

## 2017-04-01 PROCEDURE — 82803 BLOOD GASES ANY COMBINATION: CPT

## 2017-04-01 PROCEDURE — 83735 ASSAY OF MAGNESIUM: CPT | Performed by: INTERNAL MEDICINE

## 2017-04-01 PROCEDURE — 94003 VENT MGMT INPAT SUBQ DAY: CPT

## 2017-04-01 PROCEDURE — 25010000002 PROPOFOL 1000 MG/ML EMULSION: Performed by: INTERNAL MEDICINE

## 2017-04-01 PROCEDURE — 82962 GLUCOSE BLOOD TEST: CPT

## 2017-04-01 PROCEDURE — 93005 ELECTROCARDIOGRAM TRACING: CPT | Performed by: INTERNAL MEDICINE

## 2017-04-01 PROCEDURE — 25010000002 VANCOMYCIN: Performed by: INTERNAL MEDICINE

## 2017-04-01 PROCEDURE — 25010000002 PROPOFOL 10 MG/ML EMULSION: Performed by: INTERNAL MEDICINE

## 2017-04-01 PROCEDURE — 85025 COMPLETE CBC W/AUTO DIFF WBC: CPT | Performed by: INTERNAL MEDICINE

## 2017-04-01 PROCEDURE — 99232 SBSQ HOSP IP/OBS MODERATE 35: CPT | Performed by: INTERNAL MEDICINE

## 2017-04-01 PROCEDURE — 36600 WITHDRAWAL OF ARTERIAL BLOOD: CPT

## 2017-04-01 PROCEDURE — 71010 HC CHEST PA OR AP: CPT

## 2017-04-01 PROCEDURE — 84484 ASSAY OF TROPONIN QUANT: CPT | Performed by: INTERNAL MEDICINE

## 2017-04-01 PROCEDURE — 82330 ASSAY OF CALCIUM: CPT | Performed by: INTERNAL MEDICINE

## 2017-04-01 RX ORDER — MINERAL OIL AND WHITE PETROLATUM 150; 830 MG/G; MG/G
OINTMENT OPHTHALMIC 4 TIMES DAILY PRN
Status: DISCONTINUED | OUTPATIENT
Start: 2017-04-01 | End: 2017-04-08

## 2017-04-01 RX ADMIN — FAMOTIDINE 20 MG: 10 INJECTION, SOLUTION INTRAVENOUS at 18:25

## 2017-04-01 RX ADMIN — FENTANYL CITRATE 50 MCG: 50 INJECTION INTRAMUSCULAR; INTRAVENOUS at 13:24

## 2017-04-01 RX ADMIN — SODIUM CHLORIDE, POTASSIUM CHLORIDE, SODIUM LACTATE AND CALCIUM CHLORIDE 125 ML/HR: 600; 310; 30; 20 INJECTION, SOLUTION INTRAVENOUS at 19:26

## 2017-04-01 RX ADMIN — FENTANYL CITRATE 50 MCG: 50 INJECTION INTRAMUSCULAR; INTRAVENOUS at 07:09

## 2017-04-01 RX ADMIN — PROPOFOL 25 MCG/KG/MIN: 10 INJECTION, EMULSION INTRAVENOUS at 13:24

## 2017-04-01 RX ADMIN — FENTANYL CITRATE 25 MCG: 50 INJECTION INTRAMUSCULAR; INTRAVENOUS at 08:08

## 2017-04-01 RX ADMIN — TAZOBACTAM SODIUM AND PIPERACILLIN SODIUM 4.5 G: 500; 4 INJECTION, SOLUTION INTRAVENOUS at 13:24

## 2017-04-01 RX ADMIN — FENTANYL CITRATE 25 MCG: 50 INJECTION INTRAMUSCULAR; INTRAVENOUS at 04:03

## 2017-04-01 RX ADMIN — ENOXAPARIN SODIUM 40 MG: 40 INJECTION SUBCUTANEOUS at 21:03

## 2017-04-01 RX ADMIN — TAZOBACTAM SODIUM AND PIPERACILLIN SODIUM 4.5 G: 500; 4 INJECTION, SOLUTION INTRAVENOUS at 04:05

## 2017-04-01 RX ADMIN — FENTANYL CITRATE 50 MCG: 50 INJECTION INTRAMUSCULAR; INTRAVENOUS at 15:49

## 2017-04-01 RX ADMIN — FAMOTIDINE 20 MG: 10 INJECTION, SOLUTION INTRAVENOUS at 08:09

## 2017-04-01 RX ADMIN — SODIUM CHLORIDE, POTASSIUM CHLORIDE, SODIUM LACTATE AND CALCIUM CHLORIDE 125 ML/HR: 600; 310; 30; 20 INJECTION, SOLUTION INTRAVENOUS at 11:22

## 2017-04-01 RX ADMIN — VANCOMYCIN HYDROCHLORIDE 1250 MG: 1 INJECTION, POWDER, LYOPHILIZED, FOR SOLUTION INTRAVENOUS at 11:22

## 2017-04-01 RX ADMIN — CHLORHEXIDINE GLUCONATE 15 ML: 1.2 RINSE ORAL at 08:13

## 2017-04-01 RX ADMIN — FENTANYL CITRATE 50 MCG: 50 INJECTION INTRAMUSCULAR; INTRAVENOUS at 09:50

## 2017-04-01 RX ADMIN — FENTANYL CITRATE 75 MCG: 50 INJECTION INTRAMUSCULAR; INTRAVENOUS at 22:19

## 2017-04-01 RX ADMIN — PROPOFOL 30 MCG/KG/MIN: 10 INJECTION, EMULSION INTRAVENOUS at 06:06

## 2017-04-01 RX ADMIN — FENTANYL CITRATE 50 MCG: 50 INJECTION INTRAMUSCULAR; INTRAVENOUS at 18:25

## 2017-04-01 RX ADMIN — FENTANYL CITRATE 50 MCG: 50 INJECTION INTRAMUSCULAR; INTRAVENOUS at 19:56

## 2017-04-01 RX ADMIN — TAZOBACTAM SODIUM AND PIPERACILLIN SODIUM 4.5 G: 500; 4 INJECTION, SOLUTION INTRAVENOUS at 19:54

## 2017-04-01 RX ADMIN — AZITHROMYCIN DIHYDRATE 500 MG: 500 INJECTION, POWDER, LYOPHILIZED, FOR SOLUTION INTRAVENOUS at 13:35

## 2017-04-01 RX ADMIN — PROPOFOL 20 MCG/KG/MIN: 10 INJECTION, EMULSION INTRAVENOUS at 21:05

## 2017-04-01 RX ADMIN — CHLORHEXIDINE GLUCONATE 15 ML: 1.2 RINSE ORAL at 21:04

## 2017-04-01 NOTE — NURSING NOTE
DR parisi returns page and ABG results reported orders for vent changes given also reported critica lprocalcitonin and critical lactic acid which is improving but still critical also reported indeterminate troponin level seems to be improving no need to prone at this time

## 2017-04-01 NOTE — PROGRESS NOTES
Daily Progress Note.     [facility] CORONARY CARE    4/1/2017    Patient ID:  Name:  Lupe Becker  MRN:  [id number]  1950  66 y.o.  female            CC/Reason for visit: ARDS secondary to lobar pneumonia    Interval History:  Vent requirements improved,   coming down  UOP decent.  Awakens to voice, not agitated.    Vitals:  Vitals:    04/01/17 0203 04/01/17 0218 04/01/17 0602 04/01/17 0700   BP: 98/62 100/67     BP Location:       Patient Position:       Pulse: 71 70     Resp:    (!) 30   Temp:    98.2 °F (36.8 °C)   TempSrc:       SpO2: 99% 99%     Weight:   175 lb 14.8 oz (79.8 kg)    Height:         Body mass index is 35.53 kg/(m^2).    Intake/Output Summary (Last 24 hours) at 04/01/17 0742  Last data filed at 04/01/17 0602   Gross per 24 hour   Intake           4140.7 ml   Output              315 ml   Net           3825.7 ml     WEIGHTS:     Wt Readings from Last 1 Encounters:   04/01/17 0602 175 lb 14.8 oz (79.8 kg)   03/31/17 2015 169 lb 6.4 oz (76.8 kg)   03/31/17 1210 165 lb (74.8 kg)     Ventilator/Non-Invasive Ventilation Settings     Start     Ordered    03/31/17 1757  Ventilator - AC/VC; (30); 100; 88%; Other (see comments); 18; 32  Continuous     Question Answer Comment   Vent Mode AC/VC    Breath rate  30   FiO2 100    FiO2 titrate for Sp02% =/> 88%    PEEP Other (see comments)    PEEP: 18    Tidal Volume 32        03/31/17 1825    03/31/17 1550  Ventilator - AC/VC; (30); 100; 92%; 10; 320  Continuous     Question Answer Comment   Vent Mode AC/VC    Breath rate  30   FiO2 100    FiO2 titrate for Sp02% =/> 92%    PEEP 10    Tidal Volume 320        03/31/17 1552    03/31/17 1231  . BIPAP; IPAP: 12; EPAP: 8; Titrate for spO2: 88% - 92%  Until Discontinued     Question Answer Comment   . BIPAP    IPAP 12    EPAP 8    Titrate for spO2 88% - 92%        03/31/17 1230          Exam:  GENERAL:  On ventilator calm, responsive to questions  HEENT:  EOMI, nonicteric sclera, no JVD noted  due to body habitus =ET tube ane RIJ central line CDI  PULMONARY:    Diffuse rhonchi bilaterally mech breath sounds  CARDIAC:  Tachy reg  ABD: obese SNTND BS+  EXT: no c/c/e, pulses symmetric 2+ bilaterally  NEURO:  Sedated on vent, responds to commands appropriately and opens eyes to voice  SKIN: no lesions, no rash    Scheduled meds:    azithromycin 500 mg Intravenous Q24H   chlorhexidine 15 mL Mouth/Throat Q12H   enoxaparin 40 mg Subcutaneous Daily   famotidine 20 mg Intravenous BID   ondansetron 4 mg Intravenous Once   piperacillin-tazobactam 4.5 g Intravenous Q8H   vancomycin 1,250 mg Intravenous Q24H     IV meds:                        lactated ringers 125 mL/hr Last Rate: 125 mL/hr (03/31/17 1930)   norepinephrine 0.02-0.3 mcg/kg/min Last Rate: 0.08 mcg/kg/min (04/01/17 0228)   Pharmacy to dose vancomycin     phenylephrine 0.5-3 mcg/kg/min Last Rate: Stopped (03/31/17 2257)   propofol 5-50 mcg/kg/min Last Rate: 30 mcg/kg/min (04/01/17 0606)   vasopressin 0.03 Units/min        Data Review:   I reviewed the patient's medications and new clinical results.    Results from last 7 days  Lab Units 04/01/17  0612 03/31/17  1236   WBC 10*3/mm3 34.55* 38.92*   HEMOGLOBIN g/dL 11.1* 13.1   PLATELETS 10*3/mm3 262 261     Results from last 7 days  Lab Units 04/01/17  0425 03/31/17  1236   SODIUM mmol/L 135* 130*   POTASSIUM mmol/L 3.7 3.5   CHLORIDE mmol/L 98 86*   TOTAL CO2 mmol/L 21.7* 24.5   BUN mg/dL 28* 27*   CREATININE mg/dL 1.22* 1.20*   GLUCOSE mg/dL 128* 186*   CALCIUM mg/dL 7.9* 9.4   MAGNESIUM mg/dL 1.7  --    PHOSPHORUS mg/dL 2.6  --    Estimated Creatinine Clearance: 43 mL/min (by C-G formula based on Cr of 1.22).    Results from last 7 days  Lab Units 04/01/17  0425 03/31/17  2310 03/31/17  1914 03/31/17  1236   LACTATE mmol/L 1.9 2.3* 2.6* 5.1*         IMAGING:  I have reviewed all imaging studies completed since prior note.  Imaging Results (most recent)     Procedure Component Value Units Date/Time    XR  Chest 1 View [[id number]] Collected:  03/31/17 1349     Updated:  03/31/17 1353    Narrative:       EMERGENCY PORTAL CHEST SINGLE VIEW 13:20     HISTORY: Morbidly obese 66-year-old female with hypertension presents to  the ER for evaluation of shortness of breath and fever     COMPARISON: (none available)     FINDINGS:  1. Imaging is severely limited by body habitus and low lung volumes on  the right.  2. Cardiac enlargement vascular calcification prominent scoliosis and  spondylosis.  3. Right basilar opacification suspect pneumonia and effusion.  4. CT follow-up suggested.     This report was finalized on 3/31/2017 1:50 PM by Dr. Francisco J Hayes MD.       XR Chest 1 View [[id number]] Collected:  03/31/17 1653     Updated:  03/31/17 1701    Narrative:       XR CHEST 1 VW-     HISTORY: Female who is 66 years-old,  Central line and endotracheal tube  placement     TECHNIQUE: Frontal view of the chest     COMPARISON: 03/31/2017 1317 hours     FINDINGS: Endotracheal tube tip appears to be about 1 cm above the  isabel. IJ catheter extends to the superior vena cava. Heart is  enlarged. Atelectasis/infiltrate is apparent at the lung bases. There  may be minimal pleural effusions. No pneumothorax. No acute osseous  process.       Impression:       Endotracheal tube tip about 1 cm above the isabel. Right IJ  catheter, no pneumothorax. Otherwise, no significant change, continued  follow-up suggested.     Discussed by telephone with the patient's nurse, Idalia,  at time of  interpretation, 1700 on 03/31/2017.     This report was finalized on 3/31/2017 4:58 PM by Dr. Eric Beatty MD.       CT Chest Without Contrast [[id number]] Collected:  03/31/17 1814     Updated:  03/31/17 1943    Narrative:       CT CHEST WITHOUT CONTRAST     HISTORY: Possible pneumothorax. Sepsis.     TECHNIQUE: Chest CT includes axial imaging from the thoracic inlet to  the upper abdomen without IV contrast.     COMPARISON: AP chest 03/31/2017.      FINDINGS: Endotracheal tube tip is at the isabel and this could be  withdrawn 3 cm for better positioning. A right IJ central venous  catheter tip is in the SVC.     There is dense right lower lobe and middle lobe consolidation with near  complete consolidation. Air bronchograms are present. There is also  moderate to severe left lower lobe consolidation. Consolidation is also  present in the posterior right upper lobe. No upper lobe pulmonary edema  is present. There is marked cardiomegaly. Scoliotic curvature is present  in the thoracolumbar spine. Chronic multilevel posterior rib deformities  are present. Small right pleural effusion is present.     There is a left adrenal mass or potential hemorrhage measuring 2.8 x 2.2  cm.       Impression:       1. No pneumothorax.  2. Multilobar consolidation greatest within the lower lobes with air  bronchograms consistent with infiltrate/aspiration.  3. Endotracheal tube tip is at the isabel and this could be withdrawn 3  cm for better positioning.  4. Left adrenal mass or potential hemorrhage measuring approximately 2.8  x 2.2 cm.     Findings were discussed with Idalia, the nurse caring for the patient, on  03/31/2017 at 5:48 PM.     This report was finalized on 3/31/2017 7:40 PM by Dr. Angel Kimball MD.       XR Chest 1 View [[id number]] Collected:  04/01/17 0436     Updated:  04/01/17 0436    Narrative:       X-RAY CHEST 1 VIEW.     HISTORY: Follow-up pneumonia.     COMPARISON: 03/31/2017.     FINDINGS:  Mild cardiomegaly and low lung volumes. Lines and tubes are stable.     Consolidation in the lungs, right greater than left.              Impression:       Overall no significant improvement.                     ASSESSMENT /   PLAN:  Acute hypoxic and hypercapnic respiratory failure  CAP  Left adrenal mass versus hemorrhage  Septic Shock  Leukocytosis      -solucortef if  not able to wean but cortisol levels were appropriately elevated  -abx  -vent reviewed,  adjusted  Repeat abg in 2 hours, permissive hypercapnia okay   -wean  as possible  -trend lactate        Total critical care time was 46minutes, excluding any separately billable procedure time.    Charles Garcia MD    [facility]  04/01/17  7:42 AM

## 2017-04-01 NOTE — PROGRESS NOTES
"Patient Care Team:  Kiley Wei MD as PCP - General    Chief Complaint:  History WPW. Septic shock.    Interval History:   Intubated, sedated.  Coming down off of pressors.    Objective   Vital Signs  Temp:  [98.2 °F (36.8 °C)-101.4 °F (38.6 °C)] 98.2 °F (36.8 °C)  Heart Rate:  [] 62  Resp:  [20-33] 30  BP: ()/(20-96) 112/58  FiO2 (%):  [40 %-100 %] 40 %    Intake/Output Summary (Last 24 hours) at 04/01/17 1019  Last data filed at 04/01/17 0800   Gross per 24 hour   Intake           4459.7 ml   Output              365 ml   Net           4094.7 ml     Flowsheet Rows         First Filed Value    Admission Height  59\" (149.9 cm) Documented at 03/31/2017 1210    Admission Weight  165 lb (74.8 kg) Documented at 03/31/2017 1210          General Appearance:    intubated, sedated    Head:    Normocephalic, without obvious abnormality, atraumatic       Neck:   No adenopathy, supple, no thyromegaly, no carotid bruit, no    JVD   Lungs:     Clear to auscultation bilaterally, no wheezes, rales, or     rhonchi    Heart:    Normal rate, regular rhythm,  No murmur, rub, or gallop   Chest Wall:    No abnormalities observed   Abdomen:     Normal bowel sounds, soft, non-tender, non-distended,            no rebound tenderness   Extremities:   No cyanosis, clubbing, or edema   Pulses:   Pulses palpable and equal bilaterally   Skin:   No bleeding or rash   Lymph nodes:   No cervical adenopathy   Neurologic:   Cranial nerves 2 - 12 grossly intact, sensation intact             azithromycin 500 mg Intravenous Q24H   chlorhexidine 15 mL Mouth/Throat Q12H   enoxaparin 40 mg Subcutaneous Nightly   famotidine 20 mg Intravenous BID   ondansetron 4 mg Intravenous Once   piperacillin-tazobactam 4.5 g Intravenous Q8H   vancomycin 1,250 mg Intravenous Q24H         lactated ringers 125 mL/hr Last Rate: 125 mL/hr (03/31/17 1930)   norepinephrine 0.02-0.3 mcg/kg/min Last Rate: 0.05 mcg/kg/min (04/01/17 1000)   Pharmacy to dose " vancomycin     phenylephrine 0.5-3 mcg/kg/min Last Rate: Stopped (03/31/17 2257)   propofol 5-50 mcg/kg/min Last Rate: 30 mcg/kg/min (04/01/17 0606)   vasopressin 0.03 Units/min        Results Review:      Results from last 7 days  Lab Units 04/01/17  0425   SODIUM mmol/L 135*   POTASSIUM mmol/L 3.7   CHLORIDE mmol/L 98   TOTAL CO2 mmol/L 21.7*   BUN mg/dL 28*   CREATININE mg/dL 1.22*   GLUCOSE mg/dL 128*   CALCIUM mg/dL 7.9*       Results from last 7 days  Lab Units 04/01/17  0425 03/31/17  1914 03/31/17  1236   TROPONIN T ng/mL 0.019 0.059* 0.024       Results from last 7 days  Lab Units 04/01/17  0612   WBC 10*3/mm3 34.55*   HEMOGLOBIN g/dL 11.1*   HEMATOCRIT % 35.5*   PLATELETS 10*3/mm3 262               Results from last 7 days  Lab Units 04/01/17  0425   MAGNESIUM mg/dL 1.7           I reviewed the patient's new clinical results.  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  No issues at this point in time with arrhythmia.  Does have an ectopic atrial rhythm however not clinically significant    -History WPW status post ablation in the distant past no current arrhythmia EKG today shows no evidence of preexcitation.  -Hypothyroidism.  -Mild aortic insufficiency mild mitral insufficiency.  -Equivocal troponin will recheck this but may be just related to her overall status she is hypoxemic her -EKG shows no acute changes its most likely stress related.  -Transthoracic echocardiogram with low normal ejection fraction.  Normal wall motion.  No significant valvular disease.  -We will sign off

## 2017-04-01 NOTE — NURSING NOTE
New vent orders reviewed with tia in respiratory she is aware and will behere to make vent adjustments when she finishes treatment on other floor

## 2017-04-01 NOTE — PLAN OF CARE
Problem: SAFETY - NON-VIOLENT RESTRAINT  Goal: Remains free of injury from restraints (Non-Violent Restraint)  Outcome: Ongoing (interventions implemented as appropriate)

## 2017-04-01 NOTE — NURSING NOTE
Dr. Saleh'a nurse Rosangela notified of patient's heart rhythm change. No new orders received. Per nurse, Dr. Zavala should be rounding shortly as see the patient.

## 2017-04-01 NOTE — PLAN OF CARE
Problem: Patient Care Overview (Adult)  Goal: Plan of Care Review  Outcome: Ongoing (interventions implemented as appropriate)    04/01/17 0742   Coping/Psychosocial Response Interventions   Plan Of Care Reviewed With patient;daughter   Patient Care Overview   Progress improving   Outcome Evaluation   Outcome Summary/Follow up Plan good night peep is now at 12 and fi02 40% uop 325 this shift off of jhonny as of 2300 leveophed at 0.06 propofol at 30 dhruv daughter in sleep room 390 now beginning to have productive sputum small amount yellow in color restraints needed to protect ett crucial that tube not be dislodged due to high risk of emergent trach if ett is pulled out          Problem: Mechanical Ventilation, Invasive (Adult)  Goal: Signs and Symptoms of Listed Potential Problems Will be Absent or Manageable (Mechanical Ventilation, Invasive)  Outcome: Ongoing (interventions implemented as appropriate)    Problem: Respiratory Insufficiency (Adult)  Goal: Identify Related Risk Factors and Signs and Symptoms  Outcome: Ongoing (interventions implemented as appropriate)  Goal: Acid/Base Balance  Outcome: Ongoing (interventions implemented as appropriate)    Problem: Fall Risk (Adult)  Goal: Identify Related Risk Factors and Signs and Symptoms  Outcome: Ongoing (interventions implemented as appropriate)

## 2017-04-01 NOTE — PLAN OF CARE
Problem: Patient Care Overview (Adult)  Goal: Plan of Care Review  Outcome: Ongoing (interventions implemented as appropriate)    04/01/17 1925   Coping/Psychosocial Response Interventions   Plan Of Care Reviewed With patient;daughter   Patient Care Overview   Progress improving   Outcome Evaluation   Outcome Summary/Follow up Plan patient on significantly less ventilator settings and off pressures. looks better. possible SBT in AM         Problem: SAFETY - NON-VIOLENT RESTRAINT  Goal: Remains free of injury from restraints (Non-Violent Restraint)  Outcome: Ongoing (interventions implemented as appropriate)  Goal: Free from restraint(s) (Non-Violent Restraint)  Outcome: Ongoing (interventions implemented as appropriate)

## 2017-04-02 LAB
ALBUMIN SERPL-MCNC: 2.1 G/DL (ref 3.5–5.2)
ALBUMIN/GLOB SERPL: 0.7 G/DL
ALP SERPL-CCNC: 64 U/L (ref 39–117)
ALT SERPL W P-5'-P-CCNC: 115 U/L (ref 1–33)
ANION GAP SERPL CALCULATED.3IONS-SCNC: 11 MMOL/L
ARTERIAL PATENCY WRIST A: POSITIVE
AST SERPL-CCNC: 66 U/L (ref 1–32)
ATMOSPHERIC PRESS: 756.4 MMHG
BASE EXCESS BLDA CALC-SCNC: -0.1 MMOL/L (ref 0–2)
BASOPHILS # BLD AUTO: 0.01 10*3/MM3 (ref 0–0.2)
BASOPHILS NFR BLD AUTO: 0 % (ref 0–1.5)
BDY SITE: ABNORMAL
BILIRUB SERPL-MCNC: 0.5 MG/DL (ref 0.1–1.2)
BUN BLD-MCNC: 21 MG/DL (ref 8–23)
BUN/CREAT SERPL: 27.6 (ref 7–25)
CALCIUM SPEC-SCNC: 7.9 MG/DL (ref 8.6–10.5)
CHLORIDE SERPL-SCNC: 102 MMOL/L (ref 98–107)
CO2 SERPL-SCNC: 25 MMOL/L (ref 22–29)
CREAT BLD-MCNC: 0.76 MG/DL (ref 0.57–1)
DEPRECATED RDW RBC AUTO: 48.8 FL (ref 37–54)
EOSINOPHIL # BLD AUTO: 0.06 10*3/MM3 (ref 0–0.7)
EOSINOPHIL NFR BLD AUTO: 0.3 % (ref 0.3–6.2)
ERYTHROCYTE [DISTWIDTH] IN BLOOD BY AUTOMATED COUNT: 15.6 % (ref 11.7–13)
GFR SERPL CREATININE-BSD FRML MDRD: 76 ML/MIN/1.73
GLOBULIN UR ELPH-MCNC: 3.2 GM/DL
GLUCOSE BLD-MCNC: 89 MG/DL (ref 65–99)
HCO3 BLDA-SCNC: 26.6 MMOL/L (ref 22–28)
HCT VFR BLD AUTO: 30.1 % (ref 35.6–45.5)
HGB BLD-MCNC: 9.5 G/DL (ref 11.9–15.5)
HOROWITZ INDEX BLD+IHG-RTO: 40 %
IMM GRANULOCYTES # BLD: 0.12 10*3/MM3 (ref 0–0.03)
IMM GRANULOCYTES NFR BLD: 0.6 % (ref 0–0.5)
LYMPHOCYTES # BLD AUTO: 1.41 10*3/MM3 (ref 0.9–4.8)
LYMPHOCYTES NFR BLD AUTO: 6.9 % (ref 19.6–45.3)
MCH RBC QN AUTO: 27.1 PG (ref 26.9–32)
MCHC RBC AUTO-ENTMCNC: 31.6 G/DL (ref 32.4–36.3)
MCV RBC AUTO: 86 FL (ref 80.5–98.2)
MODALITY: ABNORMAL
MONOCYTES # BLD AUTO: 1.71 10*3/MM3 (ref 0.2–1.2)
MONOCYTES NFR BLD AUTO: 8.3 % (ref 5–12)
MRSA SPEC QL CULT: NORMAL
NEUTROPHILS # BLD AUTO: 17.2 10*3/MM3 (ref 1.9–8.1)
NEUTROPHILS NFR BLD AUTO: 83.9 % (ref 42.7–76)
O2 A-A PPRESDIFF RESPIRATORY: 0.4 MMHG
PCO2 BLDA: 49.3 MM HG (ref 35–45)
PEEP RESPIRATORY: 7.5 CM[H2O]
PH BLDA: 7.34 PH UNITS (ref 7.35–7.45)
PLAT MORPH BLD: NORMAL
PLATELET # BLD AUTO: 210 10*3/MM3 (ref 140–500)
PMV BLD AUTO: 11.4 FL (ref 6–12)
PO2 BLDA: 85.1 MM HG (ref 80–100)
POTASSIUM BLD-SCNC: 3.6 MMOL/L (ref 3.5–5.2)
PROT SERPL-MCNC: 5.3 G/DL (ref 6–8.5)
RBC # BLD AUTO: 3.5 10*6/MM3 (ref 3.9–5.2)
SAO2 % BLDCOA: 95.6 % (ref 92–99)
SCHISTOCYTES BLD QL SMEAR: NORMAL
SET MECH RESP RATE: 26
SODIUM BLD-SCNC: 138 MMOL/L (ref 136–145)
STOMATOCYTES BLD QL SMEAR: NORMAL
VENTILATOR MODE: AC
VT ON VENT VENT: 320 ML
WBC MORPH BLD: NORMAL
WBC NRBC COR # BLD: 20.51 10*3/MM3 (ref 4.5–10.7)

## 2017-04-02 PROCEDURE — 94003 VENT MGMT INPAT SUBQ DAY: CPT

## 2017-04-02 PROCEDURE — 36600 WITHDRAWAL OF ARTERIAL BLOOD: CPT

## 2017-04-02 PROCEDURE — 25010000002 PROPOFOL 10 MG/ML EMULSION: Performed by: INTERNAL MEDICINE

## 2017-04-02 PROCEDURE — 25010000002 ENOXAPARIN PER 10 MG: Performed by: INTERNAL MEDICINE

## 2017-04-02 PROCEDURE — 25010000002 VANCOMYCIN: Performed by: INTERNAL MEDICINE

## 2017-04-02 PROCEDURE — 82803 BLOOD GASES ANY COMBINATION: CPT

## 2017-04-02 PROCEDURE — 25010000002 PROPOFOL 1000 MG/ML EMULSION: Performed by: INTERNAL MEDICINE

## 2017-04-02 PROCEDURE — 94799 UNLISTED PULMONARY SVC/PX: CPT

## 2017-04-02 PROCEDURE — 25010000002 ONDANSETRON PER 1 MG: Performed by: EMERGENCY MEDICINE

## 2017-04-02 PROCEDURE — 25010000002 PIPERACILLIN SOD-TAZOBACTAM PER 1 G: Performed by: INTERNAL MEDICINE

## 2017-04-02 PROCEDURE — 80053 COMPREHEN METABOLIC PANEL: CPT | Performed by: INTERNAL MEDICINE

## 2017-04-02 PROCEDURE — 87070 CULTURE OTHR SPECIMN AEROBIC: CPT | Performed by: INTERNAL MEDICINE

## 2017-04-02 PROCEDURE — 85025 COMPLETE CBC W/AUTO DIFF WBC: CPT | Performed by: INTERNAL MEDICINE

## 2017-04-02 PROCEDURE — 87205 SMEAR GRAM STAIN: CPT | Performed by: INTERNAL MEDICINE

## 2017-04-02 PROCEDURE — 25010000002 FENTANYL CITRATE (PF) 100 MCG/2ML SOLUTION: Performed by: INTERNAL MEDICINE

## 2017-04-02 PROCEDURE — 85007 BL SMEAR W/DIFF WBC COUNT: CPT | Performed by: INTERNAL MEDICINE

## 2017-04-02 PROCEDURE — 25010000002 VANCOMYCIN PER 500 MG: Performed by: INTERNAL MEDICINE

## 2017-04-02 RX ORDER — PANTOPRAZOLE SODIUM 40 MG/10ML
40 INJECTION, POWDER, LYOPHILIZED, FOR SOLUTION INTRAVENOUS
Status: DISCONTINUED | OUTPATIENT
Start: 2017-04-02 | End: 2017-04-06

## 2017-04-02 RX ORDER — VANCOMYCIN HYDROCHLORIDE 1 G/200ML
1000 INJECTION, SOLUTION INTRAVENOUS EVERY 12 HOURS
Status: DISCONTINUED | OUTPATIENT
Start: 2017-04-02 | End: 2017-04-04

## 2017-04-02 RX ADMIN — CHLORHEXIDINE GLUCONATE 15 ML: 1.2 RINSE ORAL at 20:06

## 2017-04-02 RX ADMIN — FENTANYL CITRATE 50 MCG: 50 INJECTION INTRAMUSCULAR; INTRAVENOUS at 12:03

## 2017-04-02 RX ADMIN — PROPOFOL 30 MCG/KG/MIN: 10 INJECTION, EMULSION INTRAVENOUS at 04:39

## 2017-04-02 RX ADMIN — TAZOBACTAM SODIUM AND PIPERACILLIN SODIUM 4.5 G: 500; 4 INJECTION, SOLUTION INTRAVENOUS at 20:09

## 2017-04-02 RX ADMIN — TAZOBACTAM SODIUM AND PIPERACILLIN SODIUM 4.5 G: 500; 4 INJECTION, SOLUTION INTRAVENOUS at 04:25

## 2017-04-02 RX ADMIN — ONDANSETRON 4 MG: 2 INJECTION INTRAMUSCULAR; INTRAVENOUS at 21:54

## 2017-04-02 RX ADMIN — SODIUM CHLORIDE, POTASSIUM CHLORIDE, SODIUM LACTATE AND CALCIUM CHLORIDE 125 ML/HR: 600; 310; 30; 20 INJECTION, SOLUTION INTRAVENOUS at 03:18

## 2017-04-02 RX ADMIN — SODIUM CHLORIDE, POTASSIUM CHLORIDE, SODIUM LACTATE AND CALCIUM CHLORIDE 125 ML/HR: 600; 310; 30; 20 INJECTION, SOLUTION INTRAVENOUS at 11:52

## 2017-04-02 RX ADMIN — SODIUM CHLORIDE, POTASSIUM CHLORIDE, SODIUM LACTATE AND CALCIUM CHLORIDE 125 ML/HR: 600; 310; 30; 20 INJECTION, SOLUTION INTRAVENOUS at 11:26

## 2017-04-02 RX ADMIN — AZITHROMYCIN DIHYDRATE 500 MG: 500 INJECTION, POWDER, LYOPHILIZED, FOR SOLUTION INTRAVENOUS at 15:27

## 2017-04-02 RX ADMIN — PROPOFOL 25 MCG/KG/MIN: 10 INJECTION, EMULSION INTRAVENOUS at 11:02

## 2017-04-02 RX ADMIN — PROPOFOL 25 MCG/KG/MIN: 10 INJECTION, EMULSION INTRAVENOUS at 17:25

## 2017-04-02 RX ADMIN — CHLORHEXIDINE GLUCONATE 15 ML: 1.2 RINSE ORAL at 09:39

## 2017-04-02 RX ADMIN — ENOXAPARIN SODIUM 40 MG: 40 INJECTION SUBCUTANEOUS at 20:09

## 2017-04-02 RX ADMIN — FENTANYL CITRATE 50 MCG: 50 INJECTION INTRAMUSCULAR; INTRAVENOUS at 21:52

## 2017-04-02 RX ADMIN — VANCOMYCIN HYDROCHLORIDE 1250 MG: 1 INJECTION, POWDER, LYOPHILIZED, FOR SOLUTION INTRAVENOUS at 09:39

## 2017-04-02 RX ADMIN — FENTANYL CITRATE 50 MCG: 50 INJECTION INTRAMUSCULAR; INTRAVENOUS at 15:27

## 2017-04-02 RX ADMIN — FENTANYL CITRATE 50 MCG: 50 INJECTION INTRAMUSCULAR; INTRAVENOUS at 08:45

## 2017-04-02 RX ADMIN — FENTANYL CITRATE 50 MCG: 50 INJECTION INTRAMUSCULAR; INTRAVENOUS at 18:14

## 2017-04-02 RX ADMIN — TAZOBACTAM SODIUM AND PIPERACILLIN SODIUM 4.5 G: 500; 4 INJECTION, SOLUTION INTRAVENOUS at 11:52

## 2017-04-02 RX ADMIN — PANTOPRAZOLE SODIUM 40 MG: 40 INJECTION, POWDER, FOR SOLUTION INTRAVENOUS at 09:39

## 2017-04-02 RX ADMIN — FENTANYL CITRATE 50 MCG: 50 INJECTION INTRAMUSCULAR; INTRAVENOUS at 16:54

## 2017-04-02 RX ADMIN — VANCOMYCIN HYDROCHLORIDE 1000 MG: 1 INJECTION, SOLUTION INTRAVENOUS at 20:08

## 2017-04-02 NOTE — PROGRESS NOTES
Daily Progress Note.     [facility] CORONARY CARE    4/2/2017    Patient ID:  Name:  Lupe Becker  MRN:  [id number]  1950  66 y.o.  female            CC/Reason for visit: ARDS secondary to lobar pneumonia    Interval History:  Still requiring MV, awake alert this am, responds to questions, interacts.  Gives thumbs up.    Vitals:  Vitals:    04/02/17 0432 04/02/17 0500 04/02/17 0503 04/02/17 0533   BP: 111/67  106/62 102/60   BP Location:       Patient Position:       Pulse: 68  59 57   Resp:       Temp:       TempSrc:       SpO2:   95% 94%   Weight:  175 lb 14.8 oz (79.8 kg)     Height:         Body mass index is 35.53 kg/(m^2).    Intake/Output Summary (Last 24 hours) at 04/02/17 0723  Last data filed at 04/02/17 0425   Gross per 24 hour   Intake           4657.3 ml   Output             1000 ml   Net           3657.3 ml     WEIGHTS:     Wt Readings from Last 1 Encounters:   04/02/17 0500 175 lb 14.8 oz (79.8 kg)   04/01/17 0602 175 lb 14.8 oz (79.8 kg)   03/31/17 2015 169 lb 6.4 oz (76.8 kg)   03/31/17 1210 165 lb (74.8 kg)     Ventilator/Non-Invasive Ventilation Settings     Start     Ordered    03/31/17 1757  Ventilator - AC/VC; (30); 100; 88%; Other (see comments); 18; 32  Continuous     Question Answer Comment   Vent Mode AC/VC    Breath rate  30   FiO2 100    FiO2 titrate for Sp02% =/> 88%    PEEP Other (see comments)    PEEP: 18    Tidal Volume 32        03/31/17 1825    03/31/17 1550  Ventilator - AC/VC; (30); 100; 92%; 10; 320  Continuous     Question Answer Comment   Vent Mode AC/VC    Breath rate  30   FiO2 100    FiO2 titrate for Sp02% =/> 92%    PEEP 10    Tidal Volume 320        03/31/17 1552    03/31/17 1231  . BIPAP; IPAP: 12; EPAP: 8; Titrate for spO2: 88% - 92%  Until Discontinued     Question Answer Comment   . BIPAP    IPAP 12    EPAP 8    Titrate for spO2 88% - 92%        03/31/17 1230          Exam:  GENERAL:  On ventilator calm, responsive to questions  HEENT:  EOMI,  nonicteric sclera, no JVD noted due to body habitus +ET tube ane RIJ central line CDI large short neck  PULMONARY:    Diffuse rhonchi bilaterally mech breath sounds  CARDIAC:  Tachy reg  ABD: obese SNTND BS+  EXT: no c/c/e, pulses symmetric 2+ bilaterally  NEURO:  Sedated on vent, responds to commands appropriately and opens eyes to voice  SKIN: no lesions, no rash    Scheduled meds:      azithromycin 500 mg Intravenous Q24H   chlorhexidine 15 mL Mouth/Throat Q12H   enoxaparin 40 mg Subcutaneous Nightly   famotidine 20 mg Intravenous BID   ondansetron 4 mg Intravenous Once   piperacillin-tazobactam 4.5 g Intravenous Q8H   vancomycin 1,250 mg Intravenous Q24H     IV meds:                          lactated ringers 125 mL/hr Last Rate: 125 mL/hr (04/02/17 0318)   norepinephrine 0.02-0.3 mcg/kg/min Last Rate: Stopped (04/01/17 1815)   Pharmacy to dose vancomycin     phenylephrine 0.5-3 mcg/kg/min Last Rate: Stopped (03/31/17 2257)   propofol 5-50 mcg/kg/min Last Rate: 30 mcg/kg/min (04/02/17 0439)   vasopressin 0.03 Units/min        Data Review:   I reviewed the patient's medications and new clinical results.    Results from last 7 days  Lab Units 04/01/17  0612 03/31/17  1236   WBC 10*3/mm3 34.55* 38.92*   HEMOGLOBIN g/dL 11.1* 13.1   PLATELETS 10*3/mm3 262 261       Results from last 7 days  Lab Units 04/01/17  0425 03/31/17  1236   SODIUM mmol/L 135* 130*   POTASSIUM mmol/L 3.7 3.5   CHLORIDE mmol/L 98 86*   TOTAL CO2 mmol/L 21.7* 24.5   BUN mg/dL 28* 27*   CREATININE mg/dL 1.22* 1.20*   GLUCOSE mg/dL 128* 186*   CALCIUM mg/dL 7.9* 9.4   MAGNESIUM mg/dL 1.7  --    PHOSPHORUS mg/dL 2.6  --    Estimated Creatinine Clearance: 43 mL/min (by C-G formula based on Cr of 1.22).    Results from last 7 days  Lab Units 04/01/17  0820 04/01/17  0425 03/31/17  2310 03/31/17  1914 03/31/17  1236   LACTATE mmol/L 1.6 1.9 2.3* 2.6* 5.1*         IMAGING:  I have reviewed all imaging studies completed since prior note.  Imaging  Results (most recent)     Procedure Component Value Units Date/Time    XR Chest 1 View [[id number]] Collected:  03/31/17 1349     Updated:  03/31/17 1353    Narrative:       EMERGENCY PORTAL CHEST SINGLE VIEW 13:20     HISTORY: Morbidly obese 66-year-old female with hypertension presents to  the ER for evaluation of shortness of breath and fever     COMPARISON: (none available)     FINDINGS:  1. Imaging is severely limited by body habitus and low lung volumes on  the right.  2. Cardiac enlargement vascular calcification prominent scoliosis and  spondylosis.  3. Right basilar opacification suspect pneumonia and effusion.  4. CT follow-up suggested.     This report was finalized on 3/31/2017 1:50 PM by Dr. Francisco J Hayes MD.       XR Chest 1 View [[id number]] Collected:  03/31/17 1653     Updated:  03/31/17 1701    Narrative:       XR CHEST 1 VW-     HISTORY: Female who is 66 years-old,  Central line and endotracheal tube  placement     TECHNIQUE: Frontal view of the chest     COMPARISON: 03/31/2017 1317 hours     FINDINGS: Endotracheal tube tip appears to be about 1 cm above the  isabel. IJ catheter extends to the superior vena cava. Heart is  enlarged. Atelectasis/infiltrate is apparent at the lung bases. There  may be minimal pleural effusions. No pneumothorax. No acute osseous  process.       Impression:       Endotracheal tube tip about 1 cm above the isabel. Right IJ  catheter, no pneumothorax. Otherwise, no significant change, continued  follow-up suggested.     Discussed by telephone with the patient's nurse, Idalia,  at time of  interpretation, 1700 on 03/31/2017.     This report was finalized on 3/31/2017 4:58 PM by Dr. Eric Beatty MD.       CT Chest Without Contrast [[id number]] Collected:  03/31/17 1814     Updated:  03/31/17 1943    Narrative:       CT CHEST WITHOUT CONTRAST     HISTORY: Possible pneumothorax. Sepsis.     TECHNIQUE: Chest CT includes axial imaging from the thoracic inlet to  the upper  abdomen without IV contrast.     COMPARISON: AP chest 03/31/2017.     FINDINGS: Endotracheal tube tip is at the isabel and this could be  withdrawn 3 cm for better positioning. A right IJ central venous  catheter tip is in the SVC.     There is dense right lower lobe and middle lobe consolidation with near  complete consolidation. Air bronchograms are present. There is also  moderate to severe left lower lobe consolidation. Consolidation is also  present in the posterior right upper lobe. No upper lobe pulmonary edema  is present. There is marked cardiomegaly. Scoliotic curvature is present  in the thoracolumbar spine. Chronic multilevel posterior rib deformities  are present. Small right pleural effusion is present.     There is a left adrenal mass or potential hemorrhage measuring 2.8 x 2.2  cm.       Impression:       1. No pneumothorax.  2. Multilobar consolidation greatest within the lower lobes with air  bronchograms consistent with infiltrate/aspiration.  3. Endotracheal tube tip is at the isabel and this could be withdrawn 3  cm for better positioning.  4. Left adrenal mass or potential hemorrhage measuring approximately 2.8  x 2.2 cm.     Findings were discussed with Idalia, the nurse caring for the patient, on  03/31/2017 at 5:48 PM.     This report was finalized on 3/31/2017 7:40 PM by Dr. Angel Kimball MD.       XR Chest 1 View [[id number]] Collected:  04/01/17 0436     Updated:  04/01/17 0436    Narrative:       X-RAY CHEST 1 VIEW.     HISTORY: Follow-up pneumonia.     COMPARISON: 03/31/2017.     FINDINGS:  Mild cardiomegaly and low lung volumes. Lines and tubes are stable.     Consolidation in the lungs, right greater than left.              Impression:       Overall no significant improvement.                 ASSESSMENT /   PLAN:  Acute hypoxic and hypercapnic respiratory failure  CAP  Left adrenal mass versus hemorrhage  Septic Shock  Leukocytosis      -continue abx, vent support  -mv reviewed,  recheck abg hopefully continue to make progress and can start SBT tomorrow am  Will need to start TFs, corpak  continue to trend labs.  Wean  as tolerated  Stop ivfs    Discussed with bedside RN plan of care and any care issues regarding this patient today.    Total critical care time was 46minutes, excluding any separately billable procedure time.    Charles Garcia MD    [facility]  04/02/17  7:42 AM

## 2017-04-02 NOTE — NURSING NOTE
Dr. Zavala's nurse Isabel BARTON notified of patient's bradycardia, sinus in the 40's. No new orders.

## 2017-04-02 NOTE — PLAN OF CARE
Problem: Patient Care Overview (Adult)  Goal: Plan of Care Review  Outcome: Ongoing (interventions implemented as appropriate)    04/02/17 1741   Coping/Psychosocial Response Interventions   Plan Of Care Reviewed With patient;daughter   Patient Care Overview   Progress improving   Outcome Evaluation   Outcome Summary/Follow up Plan patient doing well. stable. possible SBT in am. Cortrack placement for feedings started.         Problem: SAFETY - NON-VIOLENT RESTRAINT  Goal: Remains free of injury from restraints (Non-Violent Restraint)  Outcome: Ongoing (interventions implemented as appropriate)  Goal: Free from restraint(s) (Non-Violent Restraint)  Outcome: Ongoing (interventions implemented as appropriate)

## 2017-04-02 NOTE — PROGRESS NOTES
"Pharmacy to Dose Vancomycin IV     Day 3 ADDENDUM  Consult for Dr. Rausch  Treating: Sepsis secondary to PNA  Goal trough: 15-20 mcg/mL      Current Vancomycin dose: 15.7 mg/Kg/day     IV Anti-Infectives     Ordered       Dose/Rate Route Frequency Start Stop     03/31/17 1852   azithromycin (ZITHROMAX) 500 mg 0.9% NaCl (Add-vantage) 250 mL    Ordering Provider: Rohit Rausch MD     500 mg  over 60 Minutes Intravenous Every 24 Hours 04/01/17 1400        03/31/17 1845   vancomycin 1250 mg/250 mL 0.9% NS IVPB ([facility])    Ordering Provider: Rohit Rausch MD     1,250 mg  over 120 Minutes Intravenous Every 24 Hours 04/01/17 0900        03/31/17 1843   vancomycin 1500 mg/500 mL 0.9% NS IVPB ([facility])    Ordering Provider: Rohit Rausch MD     20 mg/kg × 74.8 kg  over 120 Minutes Intravenous Once 03/31/17 2000 03/31/17 2342     03/31/17 1852   piperacillin-tazobactam (ZOSYN) 4.5 g in iso-osmotic dextrose 100 mL IVPB (premix)    Ordering Provider: Rohit Rausch MD     4.5 g  over 4 Hours Intravenous Every 8 Hours 03/31/17 2000 03/31/17 1231   cefTRIAXone (ROCEPHIN) IVPB 1 g    Ordering Provider: Renny Polanco MD     1 g  over 30 Minutes Intravenous Once 03/31/17 1233 03/31/17 1329     03/31/17 1231   azithromycin (ZITHROMAX) 500 mg 0.9% NaCl (Add-vantage) 250 mL    Ordering Provider: Renny Polanco MD     500 mg  over 60 Minutes Intravenous Once 03/31/17 1233 03/31/17 1430      Relevant clinical data and objective history reviewed:  66 y.o. female 59\" (149.9 cm) 175 lb 14.8 oz (79.8 kg)  Body mass index is 35.53 kg/(m^2).     Results from last 7 days  Lab Units 04/02/17  0942 04/01/17  0425 03/31/17  1236   CREATININE mg/dL 0.76 1.22* 1.20*      Estimated Creatinine Clearance: 65.5 mL/min (by C-G formula based on Cr of 0.76).           Lab Results   Component Value Date     WBC 20.51 (H) 04/02/2017     WBC 34.55 (C) 04/01/2017     WBC 38.92 (C) 03/31/2017      Temp: [98.5 °F (36.9 °C)-99.2 °F (37.3 " °C)] 98.6 °F (37 °C)  Heart Rate: [50-96] 55  Resp: [27] 27  BP: ()/(42-80) 105/63  FiO2 (%): [40 %] 40 %     Intake/Output Summary (Last 24 hours) at 04/02/17 1313  Last data filed at 04/02/17 1205    Gross per 24 hour   Intake 5721.3 ml   Output 1400 ml   Net 4321.3 ml         IV Meds:  lactated ringers 125 mL/hr Last Rate: 125 mL/hr (04/02/17 1152)   propofol 5-50 mcg/kg/min Last Rate: 25 mcg/kg/min (04/02/17 1102)      Ventilator/Non-Invasive Ventilation Settings     Start       Ordered     03/31/17 1757   Ventilator - AC/VC; (30); 100; 88%; Other (see comments); 18; 32 Continuous    Question Answer Comment   Vent Mode AC/VC     Breath rate   30   FiO2 100     FiO2 titrate for Sp02% =/> 88%     PEEP Other (see comments)     PEEP: 18     Tidal Volume 32           03/31/17 1825      Baseline cultures/labs/radiology:  3/31 Blood cx: NG x 24hrs  3/31 RVP: None detected  3/31 S Pneumo Ag: neg.  3/31 MRSA nares: Neg.   3/31 CXR: Right basilar opacification suspect pneumonia and effusion.  3/31 CT Chest: Multilobar consolidation greatest within the lower lobes with air bronchograms consistent with infiltrate/aspiration.  3/31 PCT: 13.78  3/31 Lactic acid: 5.1 --> 2.6 --> 2.3  4/01 CXR: Consolidation in the lungs, right greater than left. Overall no significant improvement  4/01 Lactic acid: 1.9 --> 1.6  4/02 Sputum (ET suction): 1+ GPC      Assessment/Plan:   PMH: Bicornate uterus (10/10/2016); Breast lump (10/2016); Hypertension; and Mitral valve regurgitation.     65 y/o F admitted on 03/31 w SOB (SpO2= 60%).  Scr improved, will change Vancomycin to 1000 mg IV q12hrs (25 mg/Kg/day) starting today at 21:00     Vancomycin trough due prior to 5th dose on 04/03 @ 08:30am     PK parameters: [place]: 0.054 hr-1, T1/2: 12.8 hr, Vd 55.9 L (0.7 L/kg)     Vancomycin Dosing History:  3/31 Vancomycin 1500 mg IV x1  4/01 Vancomycin 1250 mg IV q24hr     Thank you for your consult,    Mira Barrett, [facility]  Clinical Staff  Pharmacist

## 2017-04-02 NOTE — PLAN OF CARE
Problem: Patient Care Overview (Adult)  Goal: Plan of Care Review  Outcome: Ongoing (interventions implemented as appropriate)    04/02/17 0817   Coping/Psychosocial Response Interventions   Plan Of Care Reviewed With patient;daughter   Outcome Evaluation   Outcome Summary/Follow up Plan Abdomen more distended + bs remains on 30 dhruv of propofol restraints continue to protect ETT increase sputum production noted moderate to small amount yellow thick sputum. culture sunt per previous order no fever heart in the uppers 50's with bp map as low as 60 pt may have SBT this am .           Problem: Mechanical Ventilation, Invasive (Adult)  Goal: Signs and Symptoms of Listed Potential Problems Will be Absent or Manageable (Mechanical Ventilation, Invasive)  Outcome: Ongoing (interventions implemented as appropriate)    Problem: Respiratory Insufficiency (Adult)  Goal: Identify Related Risk Factors and Signs and Symptoms  Outcome: Ongoing (interventions implemented as appropriate)    Problem: Fall Risk (Adult)  Goal: Identify Related Risk Factors and Signs and Symptoms  Outcome: Ongoing (interventions implemented as appropriate)    Problem: SAFETY - NON-VIOLENT RESTRAINT  Goal: Remains free of injury from restraints (Non-Violent Restraint)  Outcome: Ongoing (interventions implemented as appropriate)  Goal: Free from restraint(s) (Non-Violent Restraint)  Outcome: Ongoing (interventions implemented as appropriate)

## 2017-04-02 NOTE — CONSULTS
"Adult Nutrition  Assessment/PES    Patient Name:  Lupe Becker  YOB: 1950  MRN: [id number]  Admit Date:  3/31/2017    Assessment Date:  4/2/2017     Consult received- ordered TF-Impact Peptide goal rate @ 45 cc/hr, fluid flushes 25 cc q hour; RD to monitor and follow           Reason for Assessment       04/02/17 1201    Reason for Assessment    Reason For Assessment/Visit identified at risk by screening criteria;nurse/nurse practitioner consult    Diagnosis --   infection in bloodstream; PNA with sepsis                Anthropometrics       04/02/17 1201    Anthropometrics (Special Considerations)    Height Used for Calculations 1.499 m (4' 11\")    Weight Used for Calculations 79.4 kg (175 lb)    RD Calculated IBW 98    RD Calculated %     RD Calculated BMI (kg/m2) 35.5    Body Mass Index (BMI)    BMI Grade 35 - 39.9 - obesity - grade II            Labs/Tests/Procedures/Meds       04/02/17 1202    Labs/Tests/Procedures/Meds    Diagnostic Test/Procedure Review reviewed    Labs/Tests Review Reviewed;Glucose;ALT;AST    Medication Review Reviewed, pertinent   zofran, PPI, propofol providing 298 kcal    Significant Vitals reviewed            Physical Findings       04/02/17 1202    Physical Findings/Assessment    Additional Documentation Physical Appearance (Group)   B=14    Physical Appearance    Overall Physical Appearance on ventilator support    Tubes nasogastric tube            Estimated/Assessed Needs       04/02/17 1202    Calculation Measurements    Weight Used For Calculations 79.4 kg (175 lb)    Height Used for Calculations 1.499 m (4' 11\")    Estimated/Assessed Energy Needs    Energy Need Method Kcal/kg    kcal/kg 25    25 Kcal/Kg (kcal) 1984.48    Estimated Kcal Range  8369-8854    Estimated/Assessed Protein Needs    Weight Used for Protein Calculation 79.4 kg (175 lb)    Protein (gm/kg) 1.2    1.2 Gm Protein (gm) 95.25    Estimated Protein Range     Estimated/Assessed Fluid " Needs    Fluid Need Method RDA method    RDA Method (mL)  1600            Nutrition Prescription Ordered       04/02/17 1203    Nutrition Prescription PO    Current PO Diet NPO                Problem/Interventions:        Problem 1       04/02/17 1204    Nutrition Diagnoses Problem 1    Problem 1 Needs Alternate Route    Etiology (related to) MNT for Treatment/Condition    Signs/Symptoms (evidenced by) NPO                    Intervention Goal       04/02/17 1204    Intervention Goal    General Maintain nutrition;Nutrition support treatment    TF/PN Inititiate TF/PN;Adjust TF/PN;Tolerate TF at goal    Weight Maintain weight            Nutrition Intervention       04/02/17 1204    Nutrition Intervention    RD/Tech Action Care plan reviewd;Follow Tx progress            Nutrition Prescription       04/02/17 1204    Nutrition Prescription EN    Enteral Prescription Enteral begin/change    Enteral Route NG    Product Impact Peptide 1.5 bruce    TF Delivery Method Continuous    Continuous TF Goal Rate (mL/hr) 45 mL/hr    Water flush (mL)  25 mL    Water Flush Frequency Per hour    New EN Prescription Ordered? Yes            Education/Evaluation       04/02/17 1205    Education    Education Will Instruct as appropriate    Monitor/Evaluation    Monitor Per protocol    Education Follow-up Reinforce PRN        Electronically signed by:  Lata Carter RD  04/02/17 12:05 PM

## 2017-04-03 ENCOUNTER — APPOINTMENT (OUTPATIENT)
Dept: GENERAL RADIOLOGY | Facility: HOSPITAL | Age: 67
End: 2017-04-03

## 2017-04-03 LAB
ALBUMIN SERPL-MCNC: 2.2 G/DL (ref 3.5–5.2)
ALBUMIN/GLOB SERPL: 0.7 G/DL
ALP SERPL-CCNC: 83 U/L (ref 39–117)
ALT SERPL W P-5'-P-CCNC: 100 U/L (ref 1–33)
ANION GAP SERPL CALCULATED.3IONS-SCNC: 10.9 MMOL/L
ARTERIAL PATENCY WRIST A: POSITIVE
AST SERPL-CCNC: 56 U/L (ref 1–32)
ATMOSPHERIC PRESS: 745.5 MMHG
BASE EXCESS BLDA CALC-SCNC: 2.3 MMOL/L (ref 0–2)
BASOPHILS # BLD AUTO: 0.02 10*3/MM3 (ref 0–0.2)
BASOPHILS NFR BLD AUTO: 0.1 % (ref 0–1.5)
BDY SITE: ABNORMAL
BILIRUB SERPL-MCNC: 0.5 MG/DL (ref 0.1–1.2)
BUN BLD-MCNC: 21 MG/DL (ref 8–23)
BUN/CREAT SERPL: 32.3 (ref 7–25)
CALCIUM SPEC-SCNC: 8 MG/DL (ref 8.6–10.5)
CHLORIDE SERPL-SCNC: 104 MMOL/L (ref 98–107)
CO2 SERPL-SCNC: 26.1 MMOL/L (ref 22–29)
CREAT BLD-MCNC: 0.65 MG/DL (ref 0.57–1)
DEPRECATED RDW RBC AUTO: 50 FL (ref 37–54)
EOSINOPHIL # BLD AUTO: 0.14 10*3/MM3 (ref 0–0.7)
EOSINOPHIL NFR BLD AUTO: 1 % (ref 0.3–6.2)
ERYTHROCYTE [DISTWIDTH] IN BLOOD BY AUTOMATED COUNT: 15.7 % (ref 11.7–13)
GFR SERPL CREATININE-BSD FRML MDRD: 91 ML/MIN/1.73
GLOBULIN UR ELPH-MCNC: 3.2 GM/DL
GLUCOSE BLD-MCNC: 127 MG/DL (ref 65–99)
GLUCOSE BLDC GLUCOMTR-MCNC: 149 MG/DL (ref 70–130)
HCO3 BLDA-SCNC: 27.8 MMOL/L (ref 22–28)
HCT VFR BLD AUTO: 30.8 % (ref 35.6–45.5)
HGB BLD-MCNC: 9.4 G/DL (ref 11.9–15.5)
HOROWITZ INDEX BLD+IHG-RTO: 40 %
IMM GRANULOCYTES # BLD: 0.28 10*3/MM3 (ref 0–0.03)
IMM GRANULOCYTES NFR BLD: 1.9 % (ref 0–0.5)
LYMPHOCYTES # BLD AUTO: 1.45 10*3/MM3 (ref 0.9–4.8)
LYMPHOCYTES NFR BLD AUTO: 9.9 % (ref 19.6–45.3)
MCH RBC QN AUTO: 26.3 PG (ref 26.9–32)
MCHC RBC AUTO-ENTMCNC: 30.5 G/DL (ref 32.4–36.3)
MCV RBC AUTO: 86.3 FL (ref 80.5–98.2)
MODALITY: ABNORMAL
MONOCYTES # BLD AUTO: 1.51 10*3/MM3 (ref 0.2–1.2)
MONOCYTES NFR BLD AUTO: 10.4 % (ref 5–12)
NEUTROPHILS # BLD AUTO: 11.18 10*3/MM3 (ref 1.9–8.1)
NEUTROPHILS NFR BLD AUTO: 76.7 % (ref 42.7–76)
NRBC BLD MANUAL-RTO: 0 /100 WBC (ref 0–0)
O2 A-A PPRESDIFF RESPIRATORY: 0.3 MMHG
PCO2 BLDA: 46.8 MM HG (ref 35–45)
PEEP RESPIRATORY: 7.5 CM[H2O]
PH BLDA: 7.38 PH UNITS (ref 7.35–7.45)
PLATELET # BLD AUTO: 225 10*3/MM3 (ref 140–500)
PMV BLD AUTO: 11.5 FL (ref 6–12)
PO2 BLDA: 71.3 MM HG (ref 80–100)
POTASSIUM BLD-SCNC: 3.5 MMOL/L (ref 3.5–5.2)
PROT SERPL-MCNC: 5.4 G/DL (ref 6–8.5)
RBC # BLD AUTO: 3.57 10*6/MM3 (ref 3.9–5.2)
SAO2 % BLDCOA: 93.6 % (ref 92–99)
SET MECH RESP RATE: 26
SODIUM BLD-SCNC: 141 MMOL/L (ref 136–145)
TOTAL RATE: 26 BREATHS/MINUTE
VANCOMYCIN TROUGH SERPL-MCNC: 14.5 MCG/ML (ref 5–20)
VENTILATOR MODE: ABNORMAL
VT ON VENT VENT: 320 ML
WBC NRBC COR # BLD: 14.58 10*3/MM3 (ref 4.5–10.7)

## 2017-04-03 PROCEDURE — 25010000002 VANCOMYCIN PER 500 MG: Performed by: INTERNAL MEDICINE

## 2017-04-03 PROCEDURE — 25010000002 PROPOFOL 1000 MG/ML EMULSION: Performed by: INTERNAL MEDICINE

## 2017-04-03 PROCEDURE — 36600 WITHDRAWAL OF ARTERIAL BLOOD: CPT

## 2017-04-03 PROCEDURE — 71010 HC CHEST PA OR AP: CPT

## 2017-04-03 PROCEDURE — 94799 UNLISTED PULMONARY SVC/PX: CPT

## 2017-04-03 PROCEDURE — 80053 COMPREHEN METABOLIC PANEL: CPT | Performed by: INTERNAL MEDICINE

## 2017-04-03 PROCEDURE — 25010000002 PROPOFOL 10 MG/ML EMULSION: Performed by: INTERNAL MEDICINE

## 2017-04-03 PROCEDURE — 82803 BLOOD GASES ANY COMBINATION: CPT

## 2017-04-03 PROCEDURE — 25010000002 ENOXAPARIN PER 10 MG: Performed by: INTERNAL MEDICINE

## 2017-04-03 PROCEDURE — 25010000002 PIPERACILLIN SOD-TAZOBACTAM PER 1 G: Performed by: INTERNAL MEDICINE

## 2017-04-03 PROCEDURE — 25010000002 FENTANYL CITRATE (PF) 100 MCG/2ML SOLUTION: Performed by: INTERNAL MEDICINE

## 2017-04-03 PROCEDURE — 80202 ASSAY OF VANCOMYCIN: CPT | Performed by: INTERNAL MEDICINE

## 2017-04-03 PROCEDURE — 85025 COMPLETE CBC W/AUTO DIFF WBC: CPT | Performed by: INTERNAL MEDICINE

## 2017-04-03 RX ORDER — POTASSIUM CHLORIDE 7.45 MG/ML
10 INJECTION INTRAVENOUS
Status: DISCONTINUED | OUTPATIENT
Start: 2017-04-03 | End: 2017-04-03

## 2017-04-03 RX ORDER — POTASSIUM CHLORIDE 1.5 G/1.77G
40 POWDER, FOR SOLUTION ORAL AS NEEDED
Status: DISCONTINUED | OUTPATIENT
Start: 2017-04-03 | End: 2017-04-03

## 2017-04-03 RX ORDER — POTASSIUM CHLORIDE 750 MG/1
40 CAPSULE, EXTENDED RELEASE ORAL AS NEEDED
Status: DISCONTINUED | OUTPATIENT
Start: 2017-04-03 | End: 2017-04-03

## 2017-04-03 RX ADMIN — PANTOPRAZOLE SODIUM 40 MG: 40 INJECTION, POWDER, FOR SOLUTION INTRAVENOUS at 06:31

## 2017-04-03 RX ADMIN — CHLORHEXIDINE GLUCONATE 15 ML: 1.2 RINSE ORAL at 09:28

## 2017-04-03 RX ADMIN — PROPOFOL 20 MCG/KG/MIN: 10 INJECTION, EMULSION INTRAVENOUS at 03:21

## 2017-04-03 RX ADMIN — PROPOFOL 20 MCG/KG/MIN: 10 INJECTION, EMULSION INTRAVENOUS at 18:09

## 2017-04-03 RX ADMIN — POTASSIUM CHLORIDE 40 MEQ: 1.5 POWDER, FOR SOLUTION ORAL at 06:29

## 2017-04-03 RX ADMIN — TAZOBACTAM SODIUM AND PIPERACILLIN SODIUM 4.5 G: 500; 4 INJECTION, SOLUTION INTRAVENOUS at 03:47

## 2017-04-03 RX ADMIN — TAZOBACTAM SODIUM AND PIPERACILLIN SODIUM 4.5 G: 500; 4 INJECTION, SOLUTION INTRAVENOUS at 21:56

## 2017-04-03 RX ADMIN — AZITHROMYCIN DIHYDRATE 500 MG: 500 INJECTION, POWDER, LYOPHILIZED, FOR SOLUTION INTRAVENOUS at 13:54

## 2017-04-03 RX ADMIN — ENOXAPARIN SODIUM 40 MG: 40 INJECTION SUBCUTANEOUS at 21:52

## 2017-04-03 RX ADMIN — POTASSIUM CHLORIDE 40 MEQ: 1.5 POWDER, FOR SOLUTION ORAL at 16:17

## 2017-04-03 RX ADMIN — FENTANYL CITRATE 50 MCG: 50 INJECTION INTRAMUSCULAR; INTRAVENOUS at 06:29

## 2017-04-03 RX ADMIN — TAZOBACTAM SODIUM AND PIPERACILLIN SODIUM 4.5 G: 500; 4 INJECTION, SOLUTION INTRAVENOUS at 13:41

## 2017-04-03 RX ADMIN — FENTANYL CITRATE 50 MCG: 50 INJECTION INTRAMUSCULAR; INTRAVENOUS at 21:53

## 2017-04-03 RX ADMIN — FENTANYL CITRATE 50 MCG: 50 INJECTION INTRAMUSCULAR; INTRAVENOUS at 00:32

## 2017-04-03 RX ADMIN — VANCOMYCIN HYDROCHLORIDE 1000 MG: 1 INJECTION, SOLUTION INTRAVENOUS at 09:00

## 2017-04-03 RX ADMIN — CHLORHEXIDINE GLUCONATE 15 ML: 1.2 RINSE ORAL at 21:55

## 2017-04-03 NOTE — PROGRESS NOTES
Daily Progress Note.     [facility]    4/3/2017    Patient ID:  Name:  Lupe Becker  MRN:  [id number]  1950  66 y.o.  female            CC/Reason for visit: ARDS secondary to lobar pneumonia    Interval History:  Off ,   BMs with TFs significant  still with sig vent support      Vitals:  Vitals:    04/03/17 0233 04/03/17 0300 04/03/17 0303 04/03/17 0434   BP: 116/62  121/66    BP Location:       Patient Position:       Pulse: 55  58    Resp:       Temp:    98.2 °F (36.8 °C)   TempSrc:    Oral   SpO2: 90%  92%    Weight:  185 lb 3 oz (84 kg)     Height:         Body mass index is 37.4 kg/(m^2).    Intake/Output Summary (Last 24 hours) at 04/03/17 0732  Last data filed at 04/03/17 0300   Gross per 24 hour   Intake           3506.7 ml   Output             1255 ml   Net           2251.7 ml     WEIGHTS:     Wt Readings from Last 1 Encounters:   04/03/17 0300 185 lb 3 oz (84 kg)   04/02/17 0500 175 lb 14.8 oz (79.8 kg)   04/01/17 0602 175 lb 14.8 oz (79.8 kg)   03/31/17 2015 169 lb 6.4 oz (76.8 kg)   03/31/17 1210 165 lb (74.8 kg)     Ventilator/Non-Invasive Ventilation Settings     Start     Ordered    03/31/17 1757  Ventilator - AC/VC; (30); 100; 88%; Other (see comments); 18; 32  Continuous     Question Answer Comment   Vent Mode AC/VC    Breath rate  30   FiO2 100    FiO2 titrate for Sp02% =/> 88%    PEEP Other (see comments)    PEEP: 18    Tidal Volume 32        03/31/17 1825    03/31/17 1550  Ventilator - AC/VC; (30); 100; 92%; 10; 320  Continuous     Question Answer Comment   Vent Mode AC/VC    Breath rate  30   FiO2 100    FiO2 titrate for Sp02% =/> 92%    PEEP 10    Tidal Volume 320        03/31/17 1552    03/31/17 1231  . BIPAP; IPAP: 12; EPAP: 8; Titrate for spO2: 88% - 92%  Until Discontinued     Question Answer Comment   . BIPAP    IPAP 12    EPAP 8    Titrate for spO2 88% - 92%        03/31/17 1230          Exam:  GENERAL:  On ventilator calm, responsive to  questions  HEENT:  EOMI, nonicteric sclera, no JVD noted due to body habitus +ET tube ane RIJ central line CDI large short neck  PULMONARY:    Diffuse rhonchi bilaterally mech breath sounds  CARDIAC:  Tachy reg  ABD: obese SNTND BS+  EXT: no c/c/e, pulses symmetric 2+ bilaterally  NEURO:  Sedated on vent, responds to commands appropriately and opens eyes to voice  SKIN: no lesions, no rash    Scheduled meds:      azithromycin 500 mg Intravenous Q24H   chlorhexidine 15 mL Mouth/Throat Q12H   enoxaparin 40 mg Subcutaneous Nightly   pantoprazole 40 mg Intravenous Q AM   piperacillin-tazobactam 4.5 g Intravenous Q8H   vancomycin 1,000 mg Intravenous Q12H     IV meds:                          norepinephrine 0.02-0.3 mcg/kg/min Last Rate: Stopped (04/01/17 1815)   Pharmacy to dose vancomycin     phenylephrine 0.5-3 mcg/kg/min Last Rate: Stopped (03/31/17 2257)   propofol 5-50 mcg/kg/min Last Rate: 20 mcg/kg/min (04/03/17 0321)   vasopressin 0.03 Units/min        Data Review:   I reviewed the patient's medications and new clinical results.    Results from last 7 days  Lab Units 04/03/17  0357 04/02/17  0942 04/01/17  0612 03/31/17  1236   WBC 10*3/mm3 14.58* 20.51* 34.55* 38.92*   HEMOGLOBIN g/dL 9.4* 9.5* 11.1* 13.1   PLATELETS 10*3/mm3 225 210 262 261       Results from last 7 days  Lab Units 04/03/17  0357 04/02/17  0942 04/01/17  0425 03/31/17  1236   SODIUM mmol/L 141 138 135* 130*   POTASSIUM mmol/L 3.5 3.6 3.7 3.5   CHLORIDE mmol/L 104 102 98 86*   TOTAL CO2 mmol/L 26.1 25.0 21.7* 24.5   BUN mg/dL 21 21 28* 27*   CREATININE mg/dL 0.65 0.76 1.22* 1.20*   GLUCOSE mg/dL 127* 89 128* 186*   CALCIUM mg/dL 8.0* 7.9* 7.9* 9.4   MAGNESIUM mg/dL  --   --  1.7  --    PHOSPHORUS mg/dL  --   --  2.6  --    Estimated Creatinine Clearance: 67.4 mL/min (by C-G formula based on Cr of 0.65).    Results from last 7 days  Lab Units 04/01/17  0820 04/01/17  0425 03/31/17  2310 03/31/17  1914 03/31/17  1236   LACTATE mmol/L 1.6 1.9 2.3*  2.6* 5.1*         IMAGING:  I have reviewed all imaging studies completed since prior note.  Imaging Results (most recent)     Procedure Component Value Units Date/Time    XR Chest 1 View [[id number]] Collected:  03/31/17 1349     Updated:  03/31/17 1353    Narrative:       EMERGENCY PORTAL CHEST SINGLE VIEW 13:20     HISTORY: Morbidly obese 66-year-old female with hypertension presents to  the ER for evaluation of shortness of breath and fever     COMPARISON: (none available)     FINDINGS:  1. Imaging is severely limited by body habitus and low lung volumes on  the right.  2. Cardiac enlargement vascular calcification prominent scoliosis and  spondylosis.  3. Right basilar opacification suspect pneumonia and effusion.  4. CT follow-up suggested.     This report was finalized on 3/31/2017 1:50 PM by Dr. Francisco J Hayes MD.       XR Chest 1 View [[id number]] Collected:  03/31/17 1653     Updated:  03/31/17 1701    Narrative:       XR CHEST 1 VW-     HISTORY: Female who is 66 years-old,  Central line and endotracheal tube  placement     TECHNIQUE: Frontal view of the chest     COMPARISON: 03/31/2017 1317 hours     FINDINGS: Endotracheal tube tip appears to be about 1 cm above the  isabel. IJ catheter extends to the superior vena cava. Heart is  enlarged. Atelectasis/infiltrate is apparent at the lung bases. There  may be minimal pleural effusions. No pneumothorax. No acute osseous  process.       Impression:       Endotracheal tube tip about 1 cm above the isabel. Right IJ  catheter, no pneumothorax. Otherwise, no significant change, continued  follow-up suggested.     Discussed by telephone with the patient's nurse, Idalia,  at time of  interpretation, 1700 on 03/31/2017.     This report was finalized on 3/31/2017 4:58 PM by Dr. Eric Beatty MD.       CT Chest Without Contrast [[id number]] Collected:  03/31/17 1814     Updated:  03/31/17 1943    Narrative:       CT CHEST WITHOUT CONTRAST     HISTORY: Possible  pneumothorax. Sepsis.     TECHNIQUE: Chest CT includes axial imaging from the thoracic inlet to  the upper abdomen without IV contrast.     COMPARISON: AP chest 03/31/2017.     FINDINGS: Endotracheal tube tip is at the isabel and this could be  withdrawn 3 cm for better positioning. A right IJ central venous  catheter tip is in the SVC.     There is dense right lower lobe and middle lobe consolidation with near  complete consolidation. Air bronchograms are present. There is also  moderate to severe left lower lobe consolidation. Consolidation is also  present in the posterior right upper lobe. No upper lobe pulmonary edema  is present. There is marked cardiomegaly. Scoliotic curvature is present  in the thoracolumbar spine. Chronic multilevel posterior rib deformities  are present. Small right pleural effusion is present.     There is a left adrenal mass or potential hemorrhage measuring 2.8 x 2.2  cm.       Impression:       1. No pneumothorax.  2. Multilobar consolidation greatest within the lower lobes with air  bronchograms consistent with infiltrate/aspiration.  3. Endotracheal tube tip is at the isabel and this could be withdrawn 3  cm for better positioning.  4. Left adrenal mass or potential hemorrhage measuring approximately 2.8  x 2.2 cm.     Findings were discussed with Idalia, the nurse caring for the patient, on  03/31/2017 at 5:48 PM.     This report was finalized on 3/31/2017 7:40 PM by Dr. Angel Kimball MD.       XR Chest 1 View [[id number]] Collected:  04/01/17 0436     Updated:  04/01/17 0436    Narrative:       X-RAY CHEST 1 VIEW.     HISTORY: Follow-up pneumonia.     COMPARISON: 03/31/2017.     FINDINGS:  Mild cardiomegaly and low lung volumes. Lines and tubes are stable.     Consolidation in the lungs, right greater than left.              Impression:       Overall no significant improvement.                 ASSESSMENT /   PLAN:  Acute hypoxic and hypercapnic respiratory failure  CAP  Left  adrenal mass versus hemorrhage  Septic Shock resolved  Leukocytosis      -continue abx, vent support  -mv reviewed, cxr today  TFs, corpak  continue to trend labs.  Slow progress but still requiring peep and pox not great.  sbt likely tomorrow, not ready yet today    Discussed with bedside RN plan of care and any care issues regarding this patient today.    Total critical care time was 38minutes, excluding any separately billable procedure time.    Charles Garcia MD    [facility]  04/03/17  7:42 AM

## 2017-04-03 NOTE — PROGRESS NOTES
Nutrition PES/Intervention Note    Patient Name:  Lupe Becker  YOB: 1950  MRN: [id number]  Admit Date:  3/31/2017    Date:  4/3/2017      Comments:  RN c/o multiple diarrhea stools since TF has started.  Will try Peptamen Intense VHP at 40 as goal. Provisions below.      Nutrition Prescription       04/03/17 1043    Nutrition Prescription EN    Enteral Prescription Enteral begin/change;Enteral to supply    Product Peptamen Intense VHP    TF Delivery Method Continuous    Continuous TF Goal Rate (mL/hr) 40 mL/hr    Water flush (mL)  30 mL    Water Flush Frequency Every 4 hours    New EN Prescription Ordered? Yes    EN to Supply    Kcal/Day 1080 Kcal/Day    Kcal/day with Propofol  1317 Kcal/day with Propofol    Protein (gm/day) 89.57 gm/day    Meet Estimated Kcal Need (%) 118 %    Meet Estimated Protein Need (%) 100 %    TF Free H2O (mL) 806.4 mL    Total Free H2O (mL/day) 986 mL/day          Electronically signed by:  Kiara Moss RD  04/03/17 10:52 AM

## 2017-04-03 NOTE — PLAN OF CARE
Problem: Mechanical Ventilation, Invasive (Adult)  Intervention: Monitor/Manage Nutrition Support        04/03/17 1051   Nutrition Interventions   Nutrition Support Management tube feeding formula adjusted   Will adjust TF formula to see if will help diarrhea.

## 2017-04-03 NOTE — PROGRESS NOTES
Discharge Planning Assessment  [facility]     Patient Name: Lupe Becker  MRN: [id number]  Today's Date: 4/3/2017    Admit Date: 3/31/2017          Discharge Needs Assessment       04/03/17 1712    Living Environment    Lives With alone    Living Arrangements house    Provides Primary Care For no one    Quality Of Family Relationships unable to assess    Able to Return to Prior Living Arrangements yes    Discharge Needs Assessment    Concerns To Be Addressed discharge planning concerns    Equipment Currently Used at Home none    Discharge Planning Comments Undetermined            Discharge Plan       04/03/17 1713    Case Management/Social Work Plan    Plan Undetermined    Patient/Family In Agreement With Plan yes    Additional Comments CCP spoke with pt's emergency contact (Kandace Vale, [phone]) re: d/c planning as pt remains intubated. Facesheet verified. CCP role explained. Pt resides alone in a two level home with two flights steps interior. Pt reportedly uses no DME, and has not had past home health or sub-acute rehab. Pt's pharmacy is [facility]. Discharge needs unknown at this time pending treatment course. CCP to follow to assist with d/c planning. Manda Kang LCSW        Discharge Placement     No information found                Demographic Summary       04/03/17 1711    Referral Information    Admission Type inpatient    Arrived From admitted as an inpatient    Referral Source nursing;physician    Reason For Consult discharge planning    Record Reviewed medical record    Primary Care Physician Information    Name Kiley Wei MD            Functional Status       04/03/17 1711    Functional Status Current    Ambulation 4-->completely dependent    Transferring 2-->assistive person    Toileting 2-->assistive person    Bathing 2-->assistive person    Dressing 2-->assistive person    Eating 2-->assistive person    Communication 3-->unable to speak (not related to language barrier)     Functional Status Prior    Ambulation 0-->independent    Transferring 0-->independent    Toileting 0-->independent    Bathing 0-->independent    Dressing 0-->independent    Eating 0-->independent    Communication 0-->understands/communicates without difficulty    Swallowing 0-->swallows foods/liquids without difficulty    Cognitive/Perceptual/Developmental    Current Mental Status/Cognitive Functioning unable to assess            Psychosocial     None            Abuse/Neglect     None            Legal     None            Substance Abuse     None            Patient Forms     None          Gilda Kang LCSW

## 2017-04-03 NOTE — PAYOR COMM NOTE
"Zurdo Lane (66 y.o. Female)          ATTENTION; NURSE REVIEW, AUTH PENDING [id number], REPLY TO UR DEPT, SERA [facility] [id number] [phone] OR UR  [phone]       Date of Birth Social Security Number Address Home Phone MRN    [id number]  [id number] [address] [phone] [id number]    Confucianist Marital Status          None Single       Admission Date Admission Type Admitting Provider Attending Provider Department, Room/Bed    3/31/17 Emergency Miracle, MD Miracle Parrish [place], MD [facility] CARE, 328/1    Discharge Date Discharge Disposition Discharge Destination                      Attending Provider: Rohit Rausch MD     Allergies:  Epinephrine    Isolation:  None   Infection:  None   Code Status:  FULL    Ht:  59\" (149.9 cm)   Wt:  185 lb 3 oz (84 kg)    Admission Cmt:  None   Principal Problem:  None                Active Insurance as of 3/31/2017     Primary Coverage     Payor Plan Insurance Group Employer/Plan Group    [employer]UE CROSS [facility] access hospital [employer] [facility] [id number]     Payor Plan Address Payor Plan Phone Number Effective From Effective To    [id number] [phone] 9/1/2016     [place] [address]       Subscriber Name Subscriber Birth Date Member ID       ZURDO LANE 1950 [id number]                 Emergency Contacts      (Rel.) Home Phone Work Phone Mobile Phone    Kandace Vale (Friend) [phone] -- --               History & Physical      Rohit Rausch MD at 3/31/2017  6:55 PM                        Patient Care Team:  Kiley Wei MD as PCP - General    Chief complaint shortness of breath    Subjective     Patient is a 66 y.o. female.  Whom I was asked to admit by the emergency room.  She was transferred urgently to the ICU for management.  Patient apparently presented to the emergency room with shortness of breath she drove herself here I'm told that her " saturations were 60% on arrival.  She had a nonrebreather placed was hypercapnic on this with a PO2 of only about 66.  She had an elevated lactate they started the sepsis protocol and placed her on a noninvasive ventilator is what she arrived to us on.  Saturations were 87-91 at most with noninvasive ventilation.  She had almost 3 L of saline on arrival and was ringing a mean arterial pressure in the mid to low 60s.  This started falling quickly.  I quickly assessed her and urgently intubated her and placed a central line because we didn't have adequate access for resuscitation.  I discussed with her daughter apparently she's been ill with the upper respiratory tract infection for a couple of weeks normal she's very active she has a little hypertension.  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 Frias and Dr. Kammerling's office the electrophysiologist and patient did indeed have an ablation of her WPW pathway and they have not found any evidence of recurrent WPW and echocardiogram back in 9 of 16 and her EF was 60% have some evidence of diastolic dysfunction and mild aortic and mitral regurgitation left atrium was mildly enlarged and Dr. Ady Saleh this consult did the emergency room because of WPW.  The patient told them she was never to take any epinephrine or norepinephrine because of this.  Dr. Saleh reviewed the records and said that he feels it's okay to treat her normal sepsis-type protocols and to use norepinephrine epinephrine if needed.      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   • Breast lump 10/10/2016    Description: right   • Hypertension    • Mitral valve regurgitation      Past Surgical History:   Procedure Laterality Date   • APPENDECTOMY     • RHINOPLASTY     • TONSILLECTOMY       Family History   Problem Relation Age of Onset   • Ovarian cancer Mother       Social History     Social History   • Marital status: Single     Spouse name: N/A   • Number of children: N/A   • Years of education: N/A     Social History Main Topics   • Smoking status: Former Smoker   • Smokeless tobacco: None   • Alcohol use No   • Drug use: No   • Sexual activity: Not Asked     Other Topics Concern   • None     Social History Narrative   • None       Allergies:  Epinephrine    Medications:  Prior to Admission medications    Medication Sig Start Date End Date Taking? Authorizing Provider   amLODIPine (NORVASC) 5 MG tablet Take 5 mg by mouth. 9/23/16  Yes Historical Provider, MD   aspirin 81 MG tablet Take  by mouth. 3/27/15  Yes Historical Provider, MD   citalopram (CELEXA) 10 MG tablet Take 1 tablet by mouth Daily. 10/10/16  Yes Kiley Wei MD   levothyroxine (SYNTHROID) 50 MCG tablet Take 1 tablet by mouth Daily. 10/10/16  Yes Kiley Wei MD   metoprolol succinate XL (TOPROL-XL) 50 MG 24 hr tablet Take 50 mg by mouth. 8/24/16  Yes Historical Provider, MD       [START ON 4/1/2017] azithromycin 500 mg Intravenous Q24H   chlorhexidine 15 mL Mouth/Throat Q12H   enoxaparin 40 mg Subcutaneous Daily   famotidine 20 mg Intravenous BID   ondansetron 4 mg Intravenous Once   piperacillin-tazobactam 4.5 g Intravenous Q8H   [START ON 4/1/2017] vancomycin 1,250 mg Intravenous Q24H   vancomycin 20 mg/kg Intravenous Once       lactated ringers 125 mL/hr    norepinephrine 0.02-0.3 mcg/kg/min Last Rate: 0.04 mcg/kg/min (03/31/17 1840)   Pharmacy to dose vancomycin     phenylephrine 0.5-3 mcg/kg/min Last Rate: 1.5 mcg/kg/min (03/31/17 1855)   propofol 5-50 mcg/kg/min Last Rate: 40 mcg/kg/min (03/31/17 1630)   vasopressin 0.03 Units/min        Objective     Vital Signs  Temp:  [101.4 °F (38.6 °C)] 101.4 °F (38.6 °C)  Heart Rate:  [] 77  Resp:  [20-33] 30  BP: ()/(20-87) 93/65  FiO2 (%):  [100 %] 100 %  Body mass index is 33.33 kg/(m^2).    Intake/Output Summary (Last 24 hours) at  "03/31/17 1855  Last data filed at 03/31/17 1422   Gross per 24 hour   Intake             1050 ml   Output                0 ml   Net             1050 ml     I/O this shift:  In: 1050 [IV Piggyback:1050]  Out: -     Flowsheet Rows         First Filed Value    Admission Height  59\" (149.9 cm) Documented at 03/31/2017 1210    Admission Weight  165 lb (74.8 kg) Documented at 03/31/2017 1210           Physical Exam:  General Appearance: Well-developed obese white female in severe respiratory distress on noninvasive ventilator on arrival.  Eyes: Conjunctiva are clear pupils equal  ENT: Looks like she has a Mallampati 3 at least airway may be a 4  Neck: He has virtually no neck very unusual body habitus but trachea is midline.  There is neck is so short that she really have to lift her chin up to try and even feel it between her chest and chin.  Lungs: She's got some coarse breath sounds bilaterally she is laboring using all accessory muscles  Cardiac: Regular rhythm heart rates only in the 70s  Abdomen: Obese soft no palpable organomegaly  : not Examined  Musc/Skel: Grossly normal  Skin: No jaundice no rashes no petechiae noted  Neuro: She was awake and alert she could follow simple commands but again was in severe distress on arrival  Extremities/P Vascular: No clubbing there is some cyanosis of all 4 distal extremities.  I could not initially palpate dorsalis pedis or posterior tibial pulses and radial pulses were very thready  MSE: Unable to assess      Labs:    Results from last 7 days  Lab Units 03/31/17  1236   WBC 10*3/mm3 38.92*   HEMOGLOBIN g/dL 13.1   PLATELETS 10*3/mm3 261         Radiographic Imaging:  Imaging Results (last 24 hours)     Procedure Component Value Units Date/Time    XR Chest 1 View [[id number]] Collected:  03/31/17 1349     Updated:  03/31/17 1353    Narrative:       EMERGENCY PORTAL CHEST SINGLE VIEW 13:20     HISTORY: Morbidly obese 66-year-old female with hypertension presents to  the ER for " evaluation of shortness of breath and fever     COMPARISON: (none available)     FINDINGS:  1. Imaging is severely limited by body habitus and low lung volumes on  the right.  2. Cardiac enlargement vascular calcification prominent scoliosis and  spondylosis.  3. Right basilar opacification suspect pneumonia and effusion.  4. CT follow-up suggested.     This report was finalized on 3/31/2017 1:50 PM by Dr. Francisco J Hayes MD.       XR Chest 1 View [[id number]] Collected:  03/31/17 1653     Updated:  03/31/17 1701    Narrative:       XR CHEST 1 VW-     HISTORY: Female who is 66 years-old,  Central line and endotracheal tube  placement     TECHNIQUE: Frontal view of the chest     COMPARISON: 03/31/2017 1317 hours     FINDINGS: Endotracheal tube tip appears to be about 1 cm above the  isabel. IJ catheter extends to the superior vena cava. Heart is  enlarged. Atelectasis/infiltrate is apparent at the lung bases. There  may be minimal pleural effusions. No pneumothorax. No acute osseous  process.       Impression:       Endotracheal tube tip about 1 cm above the isabel. Right IJ  catheter, no pneumothorax. Otherwise, no significant change, continued  follow-up suggested.     Discussed by telephone with the patient's nurse, Idalia,  at time of  interpretation, 1700 on 03/31/2017.     This report was finalized on 3/31/2017 4:58 PM by Dr. Eric Beatty MD.       CT Chest Without Contrast [[id number]] Collected:  03/31/17 1814     Updated:  03/31/17 1814    Narrative:       CT CHEST WITHOUT CONTRAST     HISTORY: Possible pneumothorax. Sepsis.     TECHNIQUE: Chest CT includes axial imaging from the thoracic inlet to  the upper abdomen without IV contrast.     COMPARISON: AP chest 03/31/2017.     FINDINGS: Endotracheal tube tip is at the isabel and this could be  withdrawn 3-4 cm for better positioning. A right IJ central venous  catheter tip is in the SVC.     There is dense right lower lobe and middle lobe consolidation with  near  complete consolidation. Air bronchograms are present. There is also  moderate to severe left lower lobe consolidation. Consolidation is also  present in the posterior right upper lobe. No upper lobe pulmonary edema  is present. There is marked cardiomegaly. Scoliotic curvature is present  in the thoracolumbar spine. Chronic multilevel posterior rib deformities  are present. Small right pleural effusion is present.     There is a left adrenal mass or potential hemorrhage measuring 2.8 x 2.2  cm.       Impression:       1. No pneumothorax.  2. Multilobar consolidation greatest within the lower lobes with air  bronchograms consistent with infiltrate/aspiration.  3. G-tube tip is at the isabel and this could be withdrawn 3-4 cm for  better positioning.  4. Left adrenal mass or potential hemorrhage measuring approximately 2.8  x 2.2 cm.     Findings were discussed with Idalia, the nurse caring for the patient, on  03/31/2017 at 5:48 PM.                   Impression #1 acute hypoxemic and hypercapnic respiratory failure.  She has extremely dense pneumonia.  I'm going to ventilate her with ARDS protocol likely she does meet the definition for ARDS.  I am going to use higher PEEP lower tidal volumes higher respiratory rates.  We will allow permissive hypercapnia.  If she does not respond quickly we will consider proning her  #2 pneumonia community-acquired sounds like she has had a preceding viral illness will cover her with broad-spectrum antibiotics.  Of course blood cultures have been obtained and get some sputum cultures.  I will add vancomycin for MRSA coverage given the severity of her illness and the preceding viral syndrome until we get some culture results.   #3 septic shock she is gotten her 30 mL per kilo fluid bolus they she still dry we'll give her another couple of liters.  And of course vasopressors we had to get emergent access.  I did do a limited quick bedside echocardiogram and measurement of her IVC  course she's on some much PEEP her IVC did not have any significant collapse her LV seemed to be squeezing normally but was not hyperdynamic as I would expect  #4 left adrenal mass versus hemorrhage on CT this will need follow-up with her shock I will check a cortisol level if she doesn't respond to therapy we may need to give her stress dose steroids  #5 hyponatremia will follow I suspect it is corrected just with fluid resuscitation  #6 elevated creatinine we don't know her baseline by gases she's got acute kidney injury but time will tell  #7 elevated proBNP I don't think this is heart failure per se echocardiogram is been ordered  #4 lactic acidosis secondary to sepsis we'll track lactic acid level to monitor her response to therapy        Rohit Rausch MD  03/31/17  6:55 PM    Time: I have spent outside of procedures over 2 hours in critical care time with this patient thus far today     Electronically signed by Rohit Rausch MD at 3/31/2017  7:11 PM        Lines, Drains & Airways    Active LDAs     Name:   Placement date:   Placement time:   Site:   Days:    Peripheral IV Line - Single Lumen 03/31/17 1205 metacarpal vein (top of hand), right 20 gauge  03/31/17    1205      3    Naso/Oral/Gastric Tube 04/02/17 0900 small diameter;nasogastric right nostril  04/02/17    0900      1    Urethral Catheter 03/31/17 1700 100% silicone 10  03/31/17    1700      2    Airway 03/31/17 1535 endotracheal tube with subglottic suction  03/31/17    1535      2    Percutaneous Central Line - Quad Lumen 03/31/17 1620 internal jugular vein, right  03/31/17    1620      2         Inactive LDAs     Name:   Placement date:   Placement time:   Removal date:   Removal time:   Site:   Days:    [REMOVED] Peripheral IV Line - Single Lumen 03/31/17 1206 metacarpal vein (top of hand), left 20 gauge  03/31/17    1206    04/03/17    0405      2    [REMOVED] Peripheral IV Line - Single Lumen 03/31/17 1432 metacarpal vein (top of  hand), left 18 gauge  03/31/17    1432    04/03/17    0410      2                Hospital Medications (all)       Dose Frequency Start End    acetaminophen (TYLENOL) tablet 1,000 mg 1,000 mg Once 3/31/2017 3/31/2017    Sig - Route: Take 2 tablets by mouth 1 (One) Time. - Oral    albuterol (PROVENTIL) nebulizer solution 0.083% 2.5 mg/3mL 2.5 mg Every 15 Minutes 3/31/2017 3/31/2017    Sig - Route: Take 2.5 mg by nebulization Every 15 (Fifteen) Minutes. - Nebulization    artificial tears (LUBRIFRESH P.M.) ophthalmic ointment  4 Times Daily PRN 4/1/2017     Sig - Route: Administer  to both eyes 4 (Four) Times a Day As Needed (1-2 drops each eye). - Both Eyes    azithromycin (ZITHROMAX) 500 mg 0.9% NaCl (Add-vantage) 250 mL 500 mg Once 3/31/2017 3/31/2017    Sig - Route: Infuse 250 mL into a venous catheter 1 (One) Time. - Intravenous    azithromycin (ZITHROMAX) 500 mg 0.9% NaCl (Add-vantage) 250 mL 500 mg Every 24 Hours 4/1/2017     Sig - Route: Infuse 250 mL into a venous catheter Daily. - Intravenous    cefTRIAXone (ROCEPHIN) IVPB 1 g 1 g Once 3/31/2017 3/31/2017    Sig - Route: Infuse 50 mL into a venous catheter 1 (One) Time. - Intravenous    chlorhexidine (PERIDEX) 0.12 % solution 15 mL 15 mL Every 12 Hours Scheduled 3/31/2017     Sig - Route: Apply 15 mL to the mouth or throat Every 12 (Twelve) Hours. - Mouth/Throat    enoxaparin (LOVENOX) syringe 40 mg 40 mg Nightly 4/1/2017     Sig - Route: Inject 0.4 mL under the skin Every Night. - Subcutaneous    FentaNYL Citrate (PF) (SUBLIMAZE) injection 25 mcg 25 mcg Every 30 Minutes PRN 3/31/2017 4/10/2017    Sig - Route: Infuse 0.5 mL into a venous catheter Every 30 (Thirty) Minutes As Needed for Severe Pain (7-10) (NRS greater than or equal to 6 or CPOT greater than or equal to 3). - Intravenous    FentaNYL Citrate (PF) (SUBLIMAZE) injection 50 mcg 50 mcg Every 30 Minutes PRN 3/31/2017 4/10/2017    Sig - Route: Infuse 1 mL into a venous catheter Every 30 (Thirty)  "Minutes As Needed (Moderate to Severe pain). - Intravenous    FentaNYL Citrate (PF) (SUBLIMAZE) injection 75 mcg 75 mcg Every 30 Minutes PRN 3/31/2017 4/10/2017    Sig - Route: Infuse 1.5 mL into a venous catheter Every 30 (Thirty) Minutes As Needed for Severe Pain (7-10). - Intravenous    ipratropium-albuterol (DUO-NEB) nebulizer solution 3 mL 3 mL Once 3/31/2017 3/31/2017    Sig - Route: Take 3 mL by nebulization 1 (One) Time. - Nebulization    Magnesium Sulfate 2 gram Bolus, followed by 8 gram infusion (total Mg dose 10 grams)- Mg less than or equal to 1mg/dL 2 g As Needed 3/31/2017     Sig - Route: Infuse 50 mL into a venous catheter As Needed (Mg less than or equal to 1mg/dL). - Intravenous    Linked Group 1:  \"Or\" Linked Group Details        magnesium sulfate 4 gram infusion- Mg 1.6-1.9 mg/dL 4 g As Needed 3/31/2017     Sig - Route: Infuse 100 mL into a venous catheter As Needed (Mg 1.6-1.9 mg/dL). - Intravenous    Linked Group 1:  \"Or\" Linked Group Details        Magnesium Sulfate 6 gram Infusion (2 gm x 3) -Mg 1.1 -1.5 mg/dL 2 g As Needed 3/31/2017     Sig - Route: Infuse 50 mL into a venous catheter As Needed (Mg 1.1 -1.5 mg/dL). - Intravenous    Linked Group 1:  \"Or\" Linked Group Details        norepinephrine (LEVOPHED) 32 mcg/mL (8 mg/250 mL) in sodium chloride 0.9% infusion (premix) 0.02-0.3 mcg/kg/min × 74.8 kg Titrated 3/31/2017     Sig - Route: Infuse 1.496-22.44 mcg/min into a venous catheter Dose Adjusted By Provider As Needed. - Intravenous    ondansetron (ZOFRAN) injection 4 mg 4 mg Once 3/31/2017 4/2/2017    Sig - Route: Infuse 2 mL into a venous catheter 1 (One) Time. - Intravenous    pantoprazole (PROTONIX) injection 40 mg 40 mg Every Early Morning 4/2/2017     Sig - Route: Infuse 10 mL into a venous catheter Every Morning. - Intravenous    Pharmacy to dose vancomycin  Continuous PRN 3/31/2017     Sig - Route: Continuous As Needed for Consult. - Does not apply    phenylephrine (JAYANT-SYNEPHRINE) " "0.2 mg/mL in sodium chloride 0.9 % 250 mL infusion 0.5-3 mcg/kg/min × 74.8 kg Titrated 3/31/2017     Sig - Route: Infuse 37.4-224.4 mcg/min into a venous catheter Dose Adjusted By Provider As Needed. - Intravenous    piperacillin-tazobactam (ZOSYN) 4.5 g in iso-osmotic dextrose 100 mL IVPB (premix) 4.5 g Every 8 Hours 3/31/2017     Sig - Route: Infuse 100 mL into a venous catheter Every 8 (Eight) Hours. - Intravenous    potassium chloride (KLOR-CON) packet 40 mEq 40 mEq As Needed 3/31/2017     Sig - Route: Take 40 mEq by mouth As Needed (potassium replacement, see admin instructions). - Oral    Linked Group 2:  \"Or\" Linked Group Details        potassium chloride (MICRO-K) CR capsule 40 mEq 40 mEq As Needed 3/31/2017     Sig - Route: Take 4 capsules by mouth As Needed (potassium replacement.  see admin instructions). - Oral    Linked Group 2:  \"Or\" Linked Group Details        potassium chloride 10 mEq in 100 mL IVPB 10 mEq Every 1 Hour PRN 3/31/2017     Sig - Route: Infuse 100 mL into a venous catheter Every 1 (One) Hour As Needed (potassium protocol PERIPHERAL - see admin instructions). - Intravenous    Linked Group 2:  \"Or\" Linked Group Details        potassium phosphate 15 mmol in sodium chloride 0.9 % 100 mL infusion 15 mmol As Needed 3/31/2017     Sig - Route: Infuse 15 mmol into a venous catheter As Needed (Phosphorus replacement - see admin instructions). - Intravenous    Linked Group 3:  \"Or\" Linked Group Details        potassium phosphate 30 mmol in sodium chloride 0.9 % 100 mL infusion 30 mmol As Needed 3/31/2017     Sig - Route: Infuse 30 mmol into a venous catheter As Needed (Phosphorus replacement - see admin instructions). - Intravenous    Linked Group 3:  \"Or\" Linked Group Details        potassium phosphate 45 mmol in sodium chloride 0.9 % 250 mL infusion 45 mmol Once As Needed 3/31/2017     Sig - Route: Infuse 45 mmol into a venous catheter 1 (One) Time As Needed (per protocol in admin instructions). " "- Intravenous    Linked Group 3:  \"Or\" Linked Group Details        propofol (DIPRIVAN) infusion 10 mg/mL 100 mL 5-50 mcg/kg/min × 74.8 kg Titrated 3/31/2017     Sig - Route: Infuse 374-3,740 mcg/min into a venous catheter Dose Adjusted By Provider As Needed. - Intravenous    sodium chloride 0.9 % bolus 1,000 mL 1,000 mL Once 3/31/2017 3/31/2017    Sig - Route: Infuse 1,000 mL into a venous catheter 1 (One) Time. - Intravenous    sodium chloride 0.9 % bolus 2,244 mL 30 mL/kg × 74.8 kg Once 3/31/2017 3/31/2017    Sig - Route: Infuse 2,244 mL into a venous catheter 1 (One) Time. - Intravenous    sodium chloride 0.9 % flush 1-10 mL 1-10 mL As Needed 3/31/2017     Sig - Route: Infuse 1-10 mL into a venous catheter As Needed for Line Care. - Intravenous    sodium chloride 0.9 % flush 10 mL 10 mL As Needed 3/31/2017     Sig - Route: Infuse 10 mL into a venous catheter As Needed for Line Care. - Intravenous    Cosign for Ordering: Accepted by Renny Polanco MD on 3/31/2017 12:08 PM    sodium phosphate 15 mmol in sodium chloride 0.9 % 100 mL IVPB 15 mmol As Needed 3/31/2017     Sig - Route: Infuse 15 mmol into a venous catheter As Needed (Phosphorus replacement - see admin instructions). - Intravenous    Linked Group 3:  \"Or\" Linked Group Details        sodium phosphate 30 mmol in sodium chloride 0.9 % 100 mL IVPB 30 mmol As Needed 3/31/2017     Sig - Route: Infuse 30 mmol into a venous catheter As Needed (Phosphorus replacement - see admin instructions). - Intravenous    Linked Group 3:  \"Or\" Linked Group Details        sodium phosphate 45 mmol in sodium chloride 0.9 % 250 mL IVPB 45 mmol As Needed 3/31/2017     Sig - Route: Infuse 45 mmol into a venous catheter As Needed (Phosphorus replacement - see admin instructions). - Intravenous    Linked Group 3:  \"Or\" Linked Group Details        vancomycin (VANCOCIN) in iso-osmotic dextrose IVPB 1 g (premix) 200 mL 1,000 mg Every 12 Hours 4/2/2017     Sig - Route: Infuse 200 mL " "into a venous catheter Every 12 (Twelve) Hours. - Intravenous    vancomycin 1500 mg/500 mL 0.9% NS IVPB ([facility]) 20 mg/kg × 74.8 kg Once 3/31/2017 3/31/2017    Sig - Route: Infuse 500 mL into a venous catheter 1 (One) Time. - Intravenous    vasopressin (PITRESSIN) 20 Units in sodium chloride 0.9 % 100 mL infusion 0.03 Units/min Titrated 3/31/2017     Sig - Route: Infuse 0.03 Units/min into a venous catheter Dose Adjusted By Provider As Needed. - Intravenous    azithromycin (ZITHROMAX) 500 mg 0.9% NaCl (Add-vantage) 250 mL (Discontinued) 500 mg Every 24 Hours 3/31/2017 3/31/2017    Sig - Route: Infuse 250 mL into a venous catheter Daily. - Intravenous    Reason for Discontinue: Error    Linked Group 4:  \"And\" Linked Group Details        enoxaparin (LOVENOX) syringe 40 mg (Discontinued) 40 mg Daily 3/31/2017 4/1/2017    Sig - Route: Inject 0.4 mL under the skin Daily. - Subcutaneous    famotidine (PEPCID) injection 20 mg (Discontinued) 20 mg 2 Times Daily 3/31/2017 4/2/2017    Sig - Route: Infuse 2 mL into a venous catheter 2 (Two) Times a Day. - Intravenous    FentaNYL Citrate (PF) (SUBLIMAZE) injection 50 mcg (Discontinued) 50 mcg Every 30 Minutes PRN 3/31/2017 3/31/2017    Sig - Route: Infuse 1 mL into a venous catheter Every 30 (Thirty) Minutes As Needed for Severe Pain (7-10). - Intravenous    lactated ringers infusion (Discontinued) 125 mL/hr Continuous 3/31/2017 4/2/2017    Sig - Route: Infuse 125 mL/hr into a venous catheter Continuous. - Intravenous    phenylephrine (JAYANT-SYNEPHRINE) 50 mg in sodium chloride 0.9 % 250 mL infusion (Discontinued) 0.5-3 mcg/kg/min × 74.8 kg Titrated 3/31/2017 3/31/2017    Sig - Route: Infuse 37.4-224.4 mcg/min into a venous catheter Dose Adjusted By Provider As Needed. - Intravenous    piperacillin-tazobactam (ZOSYN) 4.5 g in iso-osmotic dextrose 100 mL IVPB (premix) (Discontinued) 4.5 g Every 6 Hours 3/31/2017 3/31/2017    Sig - Route: Infuse 100 mL into a venous catheter Every 6 " "(Six) Hours. - Intravenous    Reason for Discontinue: Error    Linked Group 4:  \"And\" Linked Group Details        potassium chloride (KLOR-CON) packet 40 mEq (Discontinued) 40 mEq As Needed 4/3/2017 4/3/2017    Sig - Route: Take 40 mEq by mouth As Needed (potassium replacement, see admin instructions). - Oral    Cosign for Ordering: Required by Zaida Montilla MD    Linked Group 5:  \"Or\" Linked Group Details        potassium chloride (MICRO-K) CR capsule 40 mEq (Discontinued) 40 mEq As Needed 4/3/2017 4/3/2017    Sig - Route: Take 4 capsules by mouth As Needed (potassium replacement.  see admin instructions). - Oral    Cosign for Ordering: Required by Zaida Montilla MD    Linked Group 5:  \"Or\" Linked Group Details        potassium chloride 10 mEq in 100 mL IVPB (Discontinued) 10 mEq Every 1 Hour PRN 4/3/2017 4/3/2017    Sig - Route: Infuse 100 mL into a venous catheter Every 1 (One) Hour As Needed (potassium protocol PERIPHERAL - see admin instructions). - Intravenous    Cosign for Ordering: Required by Zaida Montilla MD    Linked Group 5:  \"Or\" Linked Group Details        sodium chloride 0.9 % bolus 30 mL/kg (Discontinued) 30 mL/kg Once 3/31/2017 3/31/2017    Sig - Route: Infuse 30 mL/kg into a venous catheter 1 (One) Time. - Intravenous    Reason for Discontinue: Duplicate order    vancomycin 1250 mg/250 mL 0.9% NS IVPB ([facility]) (Discontinued) 1,250 mg Every 24 Hours 2017    Sig - Route: Infuse 250 mL into a venous catheter Daily. - Intravenous    Reason for Discontinue: Dose adjustment             Consult Notes (last 7 days) (Notes from 17 through 17)      Trace Saleh MD at 3/31/2017  3:44 PM  Version          Date of Hospital Visit:17  Encounter Provider: Trace Saleh MD  Place of Service: [facility] CARDIOLOGY  Patient Name: Lupe Becker  :1950  Referral Provider: catrina  Chief complaint: WPW    History of Present " Illness:  66-year-old female with a history of Lis-Parkinson-White syndrome with the bypass tract status post ablation years ago.  Also history of echocardiogram in September 2016 showing mild aortic and mild mitral insufficiency normal LV systolic function.  She presents to the emergency room now with fever respiratory distress with markedly hypoxemic most of these notes are obtained from her chart.  She is now being intubated.  From her records it looks like she's had some recent palpitations but saw the arrhythmia service at [facility] cardiology there are infrequent felt that she had no evidence of recurrence of her bypass tract that may have some type of a other arrhythmia but since they were so infrequent no other workup needed.  There was no history of coronary artery disease or valvular disease.  No near syncope or syncope.  On admission her chest x-ray showed evidence of pneumonia but very hypoinflated her O2 saturations were in the 60s.  And she had a fever.    Past Medical History:   Diagnosis Date   • Bicornate uterus 10/10/2016   • Breast lump 10/10/2016    Description: right   • Hypertension    • Mitral valve regurgitation        Past Surgical History:   Procedure Laterality Date   • APPENDECTOMY     • RHINOPLASTY     • TONSILLECTOMY         No current facility-administered medications on file prior to encounter.      Current Outpatient Prescriptions on File Prior to Encounter   Medication Sig Dispense Refill   • amLODIPine (NORVASC) 5 MG tablet Take 5 mg by mouth.     • aspirin 81 MG tablet Take  by mouth.     • citalopram (CELEXA) 10 MG tablet Take 1 tablet by mouth Daily. 90 tablet 0   • levothyroxine (SYNTHROID) 50 MCG tablet Take 1 tablet by mouth Daily. 90 tablet 0   • metoprolol succinate XL (TOPROL-XL) 50 MG 24 hr tablet Take 50 mg by mouth.         Social History     Social History   • Marital status: Single     Spouse name: N/A   • Number of children: N/A   • Years of education: N/A  "    Occupational History   • Not on file.     Social History Main Topics   • Smoking status: Former Smoker   • Smokeless tobacco: Not on file   • Alcohol use No   • Drug use: No   • Sexual activity: Not on file     Other Topics Concern   • Not on file     Social History Narrative   • No narrative on file       Family History   Problem Relation Age of Onset   • Ovarian cancer Mother        REVIEW OF SYSTEMS:   All ROS was performed and is Negative except as outlined in HPI    Objective:     Vitals:    03/31/17 1445 03/31/17 1501 03/31/17 1510 03/31/17 1525   BP: (!) 72/51 118/85  (!) 72/51   BP Location: Right arm      Patient Position: Lying      Pulse: 100  101 100   Resp: (!) 32  (!) 30    Temp:       TempSrc:       SpO2: (!) 89%  (!) 87%    Weight:       Height:         Body mass index is 33.33 kg/(m^2).  Flowsheet Rows         First Filed Value    Admission Height  59\" (149.9 cm) Documented at 03/31/2017 1210    Admission Weight  165 lb (74.8 kg) Documented at 03/31/2017 1210          Physical Exam   Constitutional: Sedated being intubated in severe respiratory distress  HENT:   Head: Normocephalic.   Eyes: Conjunctivae are normal. Pupils are equal, round, and reactive to light. No scleral icterus.   Neck: Normal carotid pulses, no hepatojugular reflux and no JVD present. Carotid bruit is not present. No tracheal deviation, no edema and no erythema present. No thyromegaly present.   Cardiovascular: Normal rate, regular rhythm, distant heart sounds S1 normal, S2 normal, normal heart sounds and intact distal pulses.   No extrasystoles are present. PMI is not displaced.  Exam reveals no gallop, no distant heart sounds and no friction rub.    No murmur heard.  Pulses:       Carotid pulses are 2+ on the right side, and 2+ on the left side.       Radial pulses are 2+ on the right side, and 2+ on the left side.        Femoral pulses are 2+ on the right side, and 2+ on the left side.       Dorsalis pedis pulses are 1+ " on the right side, and 1+ on the left side.        Posterior tibial pulses are 1+ on the right side, and 1+ on the left side.   Pulmonary/Chest: Increased effort after placed on the ventilator diminished sounds but cord in the way up bilaterally scattered rhonchi.  Abdominal: Soft. Bowel sounds are normal. She exhibits distension and no mass. There is no hepatosplenomegaly. There is no tenderness. There is no rebound and no guarding.   Musculoskeletal: There is no edema, tenderness or deformity.   Neurological: Sedated now on ventilator   Skin: Skin is warm and dry. No rash noted. She is not diaphoretic. No cyanosis or erythema. No pallor. Nails show no clubbing.   Psychiatric: Normal mood and affect. Speech is normal and behavior is normal. Judgment and thought content normal.     Lab Review:                  Results from last 7 days  Lab Units 03/31/17  1236   SODIUM mmol/L 130*   POTASSIUM mmol/L 3.5   CHLORIDE mmol/L 86*   TOTAL CO2 mmol/L 24.5   BUN mg/dL 27*   CREATININE mg/dL 1.20*   GLUCOSE mg/dL 186*   CALCIUM mg/dL 9.4       Results from last 7 days  Lab Units 03/31/17  1236   TROPONIN T ng/mL 0.024       Results from last 7 days  Lab Units 03/31/17  1236   WBC 10*3/mm3 38.92*   HEMOGLOBIN g/dL 13.1   HEMATOCRIT % 41.9   PLATELETS 10*3/mm3 261                     @LABRCNT(bnp)@    I personally viewed and interpreted the patient's EKG/Telemetry data    Assessment:   1, this is a 66-year-old female with history of WPW now admitted with respiratory failure what appears to be pneumonia with sepsis.  She is on Ramiro-Synephrine intubated per pulmonary.  2.  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  3.  Hypothyroidism.  4.  Mild aortic insufficiency mild mitral insufficiency.  5.  Equivocal troponin will recheck this but may be just related to her overall status she is hypoxemic her EKG shows no acute changes its most likely stress  related.  6.  Elevated BNP again may be just related to her renal insufficiency and pneumonia.  We'll check an echocardiogram.        Plan:          Electronically signed by Trace Saleh MD at 3/31/2017  3:51 PM           Progress Notes  Date of Service: 4/3/2017 7:31 AM  Charles Garcia MD   Pulmonology   Expand All Collapse All    []Hide copied text    Daily Progress Note.      [facility] CORONARY CARE     4/3/2017     Patient ID:  Name: Lupe Becker  MRN: [id number]  1950  66 y.o.  female             CC/Reason for visit: ARDS secondary to lobar pneumonia     Interval History:  Off ,   BMs with TFs significant  still with sig vent support        Vitals:   Vitals           Vitals:     04/03/17 0233 04/03/17 0300 04/03/17 0303 04/03/17 0434   BP: 116/62   121/66     BP Location:           Patient Position:           Pulse: 55   58     Resp:           Temp:       98.2 °F (36.8 °C)   TempSrc:       Oral   SpO2: 90%   92%     Weight:   185 lb 3 oz (84 kg)       Height:                 Body mass index is 37.4 kg/(m^2).     Intake/Output Summary (Last 24 hours) at 04/03/17 0732  Last data filed at 04/03/17 0300    Gross per 24 hour   Intake 3506.7 ml   Output 1255 ml   Net 2251.7 ml      WEIGHTS:           Wt Readings from Last 1 Encounters:   04/03/17 0300 185 lb 3 oz (84 kg)   04/02/17 0500 175 lb 14.8 oz (79.8 kg)   04/01/17 0602 175 lb 14.8 oz (79.8 kg)   03/31/17 2015 169 lb 6.4 oz (76.8 kg)   03/31/17 1210 165 lb (74.8 kg)      Ventilator/Non-Invasive Ventilation Settings     Start       Ordered     03/31/17 1757   Ventilator - AC/VC; (30); 100; 88%; Other (see comments); 18; 32 Continuous    Question Answer Comment   Vent Mode AC/VC     Breath rate   30   FiO2 100     FiO2 titrate for Sp02% =/> 88%     PEEP Other (see comments)     PEEP: 18     Tidal Volume 32           03/31/17 1825     03/31/17 1550   Ventilator - AC/VC; (30); 100; 92%; 10; 320 Continuous    Question Answer  Comment   Vent Mode AC/VC     Breath rate   30   FiO2 100     FiO2 titrate for Sp02% =/> 92%     PEEP 10     Tidal Volume 320           03/31/17 1552     03/31/17 1231   . BIPAP; IPAP: 12; EPAP: 8; Titrate for spO2: 88% - 92% Until Discontinued    Question Answer Comment   . BIPAP     IPAP 12     EPAP 8     Titrate for spO2 88% - 92%           03/31/17 1230            Exam:  GENERAL: On ventilator calm, responsive to questions  HEENT: EOMI, nonicteric sclera, no JVD noted due to body habitus +ET tube ane RIJ central line CDI large short neck  PULMONARY: Diffuse rhonchi bilaterally mech breath sounds  CARDIAC: Tachy reg  ABD: obese SNTND BS+  EXT: no c/c/e, pulses symmetric 2+ bilaterally  NEURO: Sedated on vent, responds to commands appropriately and opens eyes to voice  SKIN: no lesions, no rash     Scheduled meds:       azithromycin 500 mg Intravenous Q24H   chlorhexidine 15 mL Mouth/Throat Q12H   enoxaparin 40 mg Subcutaneous Nightly   pantoprazole 40 mg Intravenous Q AM   piperacillin-tazobactam 4.5 g Intravenous Q8H   vancomycin 1,000 mg Intravenous Q12H      IV meds:      norepinephrine 0.02-0.3 mcg/kg/min Last Rate: Stopped (04/01/17 1815)   Pharmacy to dose vancomycin       phenylephrine 0.5-3 mcg/kg/min Last Rate: Stopped (03/31/17 2257)   propofol 5-50 mcg/kg/min Last Rate: 20 mcg/kg/min (04/03/17 0321)   vasopressin 0.03 Units/min           Data Review:  I reviewed the patient's medications and new clinical results.     Results from last 7 days  Lab Units 04/03/17  0357 04/02/17  0942 04/01/17  0612 03/31/17  1236   WBC 10*3/mm3 14.58* 20.51* 34.55* 38.92*   HEMOGLOBIN g/dL 9.4* 9.5* 11.1* 13.1   PLATELETS 10*3/mm3 225 210 262 261         Results from last 7 days  Lab Units 04/03/17  0357 04/02/17  0942 04/01/17  0425 03/31/17  1236   SODIUM mmol/L 141 138 135* 130*   POTASSIUM mmol/L 3.5 3.6 3.7 3.5   CHLORIDE mmol/L 104 102 98 86*   TOTAL CO2 mmol/L 26.1 25.0 21.7* 24.5   BUN mg/dL 21 21 28* 27*    CREATININE mg/dL 0.65 0.76 1.22* 1.20*   GLUCOSE mg/dL 127* 89 128* 186*   CALCIUM mg/dL 8.0* 7.9* 7.9* 9.4   MAGNESIUM mg/dL --  --  1.7 --    PHOSPHORUS mg/dL --  --  2.6 --    Estimated Creatinine Clearance: 67.4 mL/min (by C-G formula based on Cr of 0.65).     Results from last 7 days  Lab Units 04/01/17  0820 04/01/17  0425 03/31/17  2310 03/31/17  1914 03/31/17  1236   LACTATE mmol/L 1.6 1.9 2.3* 2.6* 5.1*            IMAGING: I have reviewed all imaging studies completed since prior note.  Imaging Results (most recent)     Procedure Component Value Units Date/Time     XR Chest 1 View [[id number]] Collected: 03/31/17 1349       Updated: 03/31/17 1353     Narrative:       EMERGENCY PORTAL CHEST SINGLE VIEW 13:20      HISTORY: Morbidly obese 66-year-old female with hypertension presents to  the ER for evaluation of shortness of breath and fever      COMPARISON: (none available)      FINDINGS:  1. Imaging is severely limited by body habitus and low lung volumes on  the right.  2. Cardiac enlargement vascular calcification prominent scoliosis and  spondylosis.  3. Right basilar opacification suspect pneumonia and effusion.  4. CT follow-up suggested.      This report was finalized on 3/31/2017 1:50 PM by Dr. Francisco J Hayes MD.         XR Chest 1 View [[id number]] Collected: 03/31/17 1653       Updated: 03/31/17 1701     Narrative:       XR CHEST 1 VW-      HISTORY: Female who is 66 years-old, Central line and endotracheal tube  placement      TECHNIQUE: Frontal view of the chest      COMPARISON: 03/31/2017 1317 hours      FINDINGS: Endotracheal tube tip appears to be about 1 cm above the  isabel. IJ catheter extends to the superior vena cava. Heart is  enlarged. Atelectasis/infiltrate is apparent at the lung bases. There  may be minimal pleural effusions. No pneumothorax. No acute osseous  process.         Impression:       Endotracheal tube tip about 1 cm above the isabel. Right IJ  catheter, no pneumothorax.  Otherwise, no significant change, continued  follow-up suggested.      Discussed by telephone with the patient's nurse, Idalia, at time of  interpretation, 1700 on 03/31/2017.      This report was finalized on 3/31/2017 4:58 PM by Dr. Eric Beatty MD.         CT Chest Without Contrast [[id number]] Collected: 03/31/17 1814       Updated: 03/31/17 1943     Narrative:       CT CHEST WITHOUT CONTRAST      HISTORY: Possible pneumothorax. Sepsis.      TECHNIQUE: Chest CT includes axial imaging from the thoracic inlet to  the upper abdomen without IV contrast.      COMPARISON: AP chest 03/31/2017.      FINDINGS: Endotracheal tube tip is at the isabel and this could be  withdrawn 3 cm for better positioning. A right IJ central venous  catheter tip is in the SVC.      There is dense right lower lobe and middle lobe consolidation with near  complete consolidation. Air bronchograms are present. There is also  moderate to severe left lower lobe consolidation. Consolidation is also  present in the posterior right upper lobe. No upper lobe pulmonary edema  is present. There is marked cardiomegaly. Scoliotic curvature is present  in the thoracolumbar spine. Chronic multilevel posterior rib deformities  are present. Small right pleural effusion is present.      There is a left adrenal mass or potential hemorrhage measuring 2.8 x 2.2  cm.         Impression:       1. No pneumothorax.  2. Multilobar consolidation greatest within the lower lobes with air  bronchograms consistent with infiltrate/aspiration.  3. Endotracheal tube tip is at the isabel and this could be withdrawn 3  cm for better positioning.  4. Left adrenal mass or potential hemorrhage measuring approximately 2.8  x 2.2 cm.      Findings were discussed with Idalia, the nurse caring for the patient, on  03/31/2017 at 5:48 PM.      This report was finalized on 3/31/2017 7:40 PM by Dr. Angel Kimball MD.         XR Chest 1 View [[id number]] Collected: 04/01/17 7625        Updated: 04/01/17 0436     Narrative:       X-RAY CHEST 1 VIEW.      HISTORY: Follow-up pneumonia.      COMPARISON: 03/31/2017.      FINDINGS:  Mild cardiomegaly and low lung volumes. Lines and tubes are stable.      Consolidation in the lungs, right greater than left.              Impression:       Overall no significant improvement.                 ASSESSMENT / PLAN:  Acute hypoxic and hypercapnic respiratory failure  CAP  Left adrenal mass versus hemorrhage  Septic Shock resolved  Leukocytosis        -continue abx, vent support  -mv reviewed, cxr today  TFs, corpak  continue to trend labs.  Slow progress but still requiring peep and pox not great.  sbt likely tomorrow, not ready yet today     Discussed with bedside RN plan of care and any care issues regarding this patient today.     Total critical care time was 38minutes, excluding any separately billable procedure time.     Charles Garcia MD     [facility]  04/03/17  7:42 AM

## 2017-04-03 NOTE — PROGRESS NOTES
"Pharmacokinetic Consult - Vancomycin Dosing (Follow-up Note)  Lupe HOPPER Eugenio  Day 4  Indication: PNA  Requesting Physician: Dr. Rausch  Goal trough: 15-20 mg/L     Relevant clinical data and objective history reviewed:  66 y.o. female 59\" (149.9 cm) 185 lb 3 oz (84 kg)      Estimated Creatinine Clearance: 67.4 mL/min (by C-G formula based on Cr of 0.65).    Lab Results   Component Value Date    WBC 14.58 (H) 04/03/2017     Temp Readings from Last 3 Encounters:   04/03/17 97.3 °F (36.3 °C) (Oral)   10/10/16 97.7 °F (36.5 °C)   09/10/15 98.5 °F (36.9 °C) (Oral)         Baseline culture/source/susceptibility:  Blood Culture   Date Value Ref Range Status   03/31/2017 No growth at 2 days  Preliminary   03/31/2017 No growth at 2 days  Preliminary           Respiratory Culture   Date Value Ref Range Status   04/02/2017 No growth  Preliminary     MRSA SCREEN CX   Date Value Ref Range Status   03/31/2017   Final    No Methicillin Resistant Staphylococcus aureus isolated            Lab Results   Component Value Date    VANCOTROUGH 14.50 04/03/2017     No results found for: RUBÉN      Assessment/Plan    Patient is currently on vancomycin 1000 mg q12h for PNA This morning trough level came back at 14.50 (range 15-20). The level was drawn at 04/03 0357  approximately 8 hours from his last dose at 04/02 2008.     The patient was originally started on a bolus dose of 1500mg followed by 1250mg q24h. Patient has received 2 doses of the 1250, one on 4/01 and one on 4/02. [place] on 4/02 the dose was adjusted to 1000mg q12h, and the patient has received 1 dose before the level was drawn.     Due to the change in dosing prior to the level being drawn, will not make any adjustments at this time. Will plan a new level to be drawn tomorrow 4/04 at 0830 prior to the 4th dose.     Will monitor serum creatinine at least every 48 hours per dosing recommendations, level planned for tomorrow AM. Pharmacy will continue to follow daily while " on vancomycin and adjust as needed.     Thank you for allowing me to participate in this patients care  Adalberto Noel, RavenD

## 2017-04-03 NOTE — PLAN OF CARE
Problem: Patient Care Overview (Adult)  Goal: Plan of Care Review  Outcome: Ongoing (interventions implemented as appropriate)    Problem: SAFETY - NON-VIOLENT RESTRAINT  Goal: Remains free of injury from restraints (Non-Violent Restraint)  Outcome: Ongoing (interventions implemented as appropriate)  Goal: Free from restraint(s) (Non-Violent Restraint)  Outcome: Ongoing (interventions implemented as appropriate)

## 2017-04-04 ENCOUNTER — APPOINTMENT (OUTPATIENT)
Dept: GENERAL RADIOLOGY | Facility: HOSPITAL | Age: 67
End: 2017-04-04

## 2017-04-04 LAB
ALBUMIN SERPL-MCNC: 2.5 G/DL (ref 3.5–5.2)
ALBUMIN/GLOB SERPL: 0.8 G/DL
ALP SERPL-CCNC: 89 U/L (ref 39–117)
ALT SERPL W P-5'-P-CCNC: 91 U/L (ref 1–33)
ANION GAP SERPL CALCULATED.3IONS-SCNC: 9.5 MMOL/L
ANISOCYTOSIS BLD QL: NORMAL
ARTERIAL PATENCY WRIST A: POSITIVE
AST SERPL-CCNC: 48 U/L (ref 1–32)
ATMOSPHERIC PRESS: 739.9 MMHG
BACTERIA SPEC RESP CULT: NORMAL
BASE EXCESS BLDA CALC-SCNC: 4.4 MMOL/L (ref 0–2)
BASOPHILS # BLD AUTO: 0.09 10*3/MM3 (ref 0–0.2)
BASOPHILS NFR BLD AUTO: 0.8 % (ref 0–1.5)
BDY SITE: ABNORMAL
BILIRUB SERPL-MCNC: 0.3 MG/DL (ref 0.1–1.2)
BUN BLD-MCNC: 15 MG/DL (ref 8–23)
BUN/CREAT SERPL: 25 (ref 7–25)
CALCIUM SPEC-SCNC: 8.2 MG/DL (ref 8.6–10.5)
CHLORIDE SERPL-SCNC: 105 MMOL/L (ref 98–107)
CO2 SERPL-SCNC: 27.5 MMOL/L (ref 22–29)
CREAT BLD-MCNC: 0.6 MG/DL (ref 0.57–1)
DEPRECATED RDW RBC AUTO: 52.4 FL (ref 37–54)
EOSINOPHIL # BLD AUTO: 0.29 10*3/MM3 (ref 0–0.7)
EOSINOPHIL NFR BLD AUTO: 2.6 % (ref 0.3–6.2)
ERYTHROCYTE [DISTWIDTH] IN BLOOD BY AUTOMATED COUNT: 16.3 % (ref 11.7–13)
GFR SERPL CREATININE-BSD FRML MDRD: 100 ML/MIN/1.73
GLOBULIN UR ELPH-MCNC: 3.3 GM/DL
GLUCOSE BLD-MCNC: 98 MG/DL (ref 65–99)
GRAM STN SPEC: NORMAL
HCO3 BLDA-SCNC: 31.1 MMOL/L (ref 22–28)
HCT VFR BLD AUTO: 31 % (ref 35.6–45.5)
HGB BLD-MCNC: 9.2 G/DL (ref 11.9–15.5)
HOROWITZ INDEX BLD+IHG-RTO: 40 %
IMM GRANULOCYTES # BLD: 0.76 10*3/MM3 (ref 0–0.03)
IMM GRANULOCYTES NFR BLD: 6.9 % (ref 0–0.5)
LYMPHOCYTES # BLD AUTO: 1.68 10*3/MM3 (ref 0.9–4.8)
LYMPHOCYTES NFR BLD AUTO: 15.3 % (ref 19.6–45.3)
MCH RBC QN AUTO: 25.7 PG (ref 26.9–32)
MCHC RBC AUTO-ENTMCNC: 29.7 G/DL (ref 32.4–36.3)
MCV RBC AUTO: 86.6 FL (ref 80.5–98.2)
MODALITY: ABNORMAL
MONOCYTES # BLD AUTO: 1.5 10*3/MM3 (ref 0.2–1.2)
MONOCYTES NFR BLD AUTO: 13.7 % (ref 5–12)
NEUTROPHILS # BLD AUTO: 6.65 10*3/MM3 (ref 1.9–8.1)
NEUTROPHILS NFR BLD AUTO: 60.7 % (ref 42.7–76)
NRBC BLD MANUAL-RTO: 0.2 /100 WBC (ref 0–0)
O2 A-A PPRESDIFF RESPIRATORY: 0.3 MMHG
PCO2 BLDA: 56.9 MM HG (ref 35–45)
PEEP RESPIRATORY: 7.5 CM[H2O]
PH BLDA: 7.34 PH UNITS (ref 7.35–7.45)
PLAT MORPH BLD: NORMAL
PLATELET # BLD AUTO: 225 10*3/MM3 (ref 140–500)
PMV BLD AUTO: 11.2 FL (ref 6–12)
PO2 BLDA: 69.7 MM HG (ref 80–100)
POLYCHROMASIA BLD QL SMEAR: NORMAL
POTASSIUM BLD-SCNC: 4.1 MMOL/L (ref 3.5–5.2)
PROT SERPL-MCNC: 5.8 G/DL (ref 6–8.5)
RBC # BLD AUTO: 3.58 10*6/MM3 (ref 3.9–5.2)
SAO2 % BLDCOA: 92.2 % (ref 92–99)
SET MECH RESP RATE: 20
SODIUM BLD-SCNC: 142 MMOL/L (ref 136–145)
STOMATOCYTES BLD QL SMEAR: NORMAL
TOTAL RATE: 20 BREATHS/MINUTE
VANCOMYCIN TROUGH SERPL-MCNC: 11.8 MCG/ML (ref 5–20)
VENTILATOR MODE: AC
VT ON VENT VENT: 320 ML
WBC MORPH BLD: NORMAL
WBC NRBC COR # BLD: 10.97 10*3/MM3 (ref 4.5–10.7)

## 2017-04-04 PROCEDURE — 94640 AIRWAY INHALATION TREATMENT: CPT

## 2017-04-04 PROCEDURE — 25010000002 ENOXAPARIN PER 10 MG: Performed by: INTERNAL MEDICINE

## 2017-04-04 PROCEDURE — 25010000002 PROPOFOL 1000 MG/ML EMULSION: Performed by: INTERNAL MEDICINE

## 2017-04-04 PROCEDURE — 94799 UNLISTED PULMONARY SVC/PX: CPT

## 2017-04-04 PROCEDURE — 25010000002 PIPERACILLIN SOD-TAZOBACTAM PER 1 G: Performed by: INTERNAL MEDICINE

## 2017-04-04 PROCEDURE — 25010000002 FENTANYL CITRATE (PF) 100 MCG/2ML SOLUTION: Performed by: INTERNAL MEDICINE

## 2017-04-04 PROCEDURE — 25010000002 VANCOMYCIN PER 500 MG: Performed by: INTERNAL MEDICINE

## 2017-04-04 PROCEDURE — 97110 THERAPEUTIC EXERCISES: CPT

## 2017-04-04 PROCEDURE — 71010 HC CHEST PA OR AP: CPT

## 2017-04-04 PROCEDURE — 97162 PT EVAL MOD COMPLEX 30 MIN: CPT

## 2017-04-04 PROCEDURE — 25010000002 VANCOMYCIN: Performed by: INTERNAL MEDICINE

## 2017-04-04 PROCEDURE — 80202 ASSAY OF VANCOMYCIN: CPT | Performed by: INTERNAL MEDICINE

## 2017-04-04 PROCEDURE — 85007 BL SMEAR W/DIFF WBC COUNT: CPT | Performed by: INTERNAL MEDICINE

## 2017-04-04 PROCEDURE — 25010000002 PROPOFOL 10 MG/ML EMULSION: Performed by: INTERNAL MEDICINE

## 2017-04-04 PROCEDURE — 74000 HC ABDOMEN KUB: CPT

## 2017-04-04 PROCEDURE — 80053 COMPREHEN METABOLIC PANEL: CPT | Performed by: INTERNAL MEDICINE

## 2017-04-04 PROCEDURE — 82803 BLOOD GASES ANY COMBINATION: CPT

## 2017-04-04 PROCEDURE — 36600 WITHDRAWAL OF ARTERIAL BLOOD: CPT

## 2017-04-04 PROCEDURE — 94003 VENT MGMT INPAT SUBQ DAY: CPT

## 2017-04-04 PROCEDURE — 85025 COMPLETE CBC W/AUTO DIFF WBC: CPT | Performed by: INTERNAL MEDICINE

## 2017-04-04 RX ORDER — ACETYLCYSTEINE 200 MG/ML
600 SOLUTION ORAL; RESPIRATORY (INHALATION) EVERY 12 HOURS SCHEDULED
Status: DISCONTINUED | OUTPATIENT
Start: 2017-04-04 | End: 2017-04-05

## 2017-04-04 RX ORDER — ALBUTEROL SULFATE 90 UG/1
6 AEROSOL, METERED RESPIRATORY (INHALATION)
Status: DISCONTINUED | OUTPATIENT
Start: 2017-04-04 | End: 2017-04-06

## 2017-04-04 RX ADMIN — ALBUTEROL SULFATE 6 PUFF: 90 AEROSOL, METERED RESPIRATORY (INHALATION) at 12:35

## 2017-04-04 RX ADMIN — FENTANYL CITRATE 75 MCG: 50 INJECTION INTRAMUSCULAR; INTRAVENOUS at 20:03

## 2017-04-04 RX ADMIN — ACETYLCYSTEINE 600 MG: 200 SOLUTION ORAL; RESPIRATORY (INHALATION) at 20:17

## 2017-04-04 RX ADMIN — FENTANYL CITRATE 75 MCG: 50 INJECTION INTRAMUSCULAR; INTRAVENOUS at 14:21

## 2017-04-04 RX ADMIN — AZITHROMYCIN DIHYDRATE 500 MG: 500 INJECTION, POWDER, LYOPHILIZED, FOR SOLUTION INTRAVENOUS at 14:55

## 2017-04-04 RX ADMIN — CHLORHEXIDINE GLUCONATE 15 ML: 1.2 RINSE ORAL at 22:37

## 2017-04-04 RX ADMIN — TAZOBACTAM SODIUM AND PIPERACILLIN SODIUM 4.5 G: 500; 4 INJECTION, SOLUTION INTRAVENOUS at 04:51

## 2017-04-04 RX ADMIN — ACETYLCYSTEINE 600 MG: 200 SOLUTION ORAL; RESPIRATORY (INHALATION) at 12:36

## 2017-04-04 RX ADMIN — FENTANYL CITRATE 75 MCG: 50 INJECTION INTRAMUSCULAR; INTRAVENOUS at 15:37

## 2017-04-04 RX ADMIN — TAZOBACTAM SODIUM AND PIPERACILLIN SODIUM 3.38 G: 375; 3 INJECTION, SOLUTION INTRAVENOUS at 13:08

## 2017-04-04 RX ADMIN — ENOXAPARIN SODIUM 40 MG: 40 INJECTION SUBCUTANEOUS at 20:04

## 2017-04-04 RX ADMIN — PROPOFOL 25 MCG/KG/MIN: 10 INJECTION, EMULSION INTRAVENOUS at 20:03

## 2017-04-04 RX ADMIN — FENTANYL CITRATE 75 MCG: 50 INJECTION INTRAMUSCULAR; INTRAVENOUS at 18:56

## 2017-04-04 RX ADMIN — FENTANYL CITRATE 50 MCG: 50 INJECTION INTRAMUSCULAR; INTRAVENOUS at 04:49

## 2017-04-04 RX ADMIN — PROPOFOL 15 MCG/KG/MIN: 10 INJECTION, EMULSION INTRAVENOUS at 09:36

## 2017-04-04 RX ADMIN — VANCOMYCIN HYDROCHLORIDE 1250 MG: 1 INJECTION, POWDER, LYOPHILIZED, FOR SOLUTION INTRAVENOUS at 10:05

## 2017-04-04 RX ADMIN — VANCOMYCIN HYDROCHLORIDE 1000 MG: 1 INJECTION, SOLUTION INTRAVENOUS at 00:06

## 2017-04-04 RX ADMIN — FENTANYL CITRATE 50 MCG: 50 INJECTION INTRAMUSCULAR; INTRAVENOUS at 08:18

## 2017-04-04 RX ADMIN — PROPOFOL 25 MCG/KG/MIN: 10 INJECTION, EMULSION INTRAVENOUS at 02:55

## 2017-04-04 RX ADMIN — VANCOMYCIN HYDROCHLORIDE 1250 MG: 1 INJECTION, POWDER, LYOPHILIZED, FOR SOLUTION INTRAVENOUS at 22:33

## 2017-04-04 RX ADMIN — CHLORHEXIDINE GLUCONATE 15 ML: 1.2 RINSE ORAL at 08:18

## 2017-04-04 RX ADMIN — ALBUTEROL SULFATE 6 PUFF: 90 AEROSOL, METERED RESPIRATORY (INHALATION) at 15:24

## 2017-04-04 RX ADMIN — ALBUTEROL SULFATE 6 PUFF: 90 AEROSOL, METERED RESPIRATORY (INHALATION) at 20:18

## 2017-04-04 RX ADMIN — PANTOPRAZOLE SODIUM 40 MG: 40 INJECTION, POWDER, FOR SOLUTION INTRAVENOUS at 06:58

## 2017-04-04 RX ADMIN — FENTANYL CITRATE 50 MCG: 50 INJECTION INTRAMUSCULAR; INTRAVENOUS at 12:51

## 2017-04-04 RX ADMIN — TAZOBACTAM SODIUM AND PIPERACILLIN SODIUM 3.38 G: 375; 3 INJECTION, SOLUTION INTRAVENOUS at 20:03

## 2017-04-04 NOTE — PLAN OF CARE
Problem: Patient Care Overview (Adult)  Goal: Plan of Care Review  Outcome: Ongoing (interventions implemented as appropriate)    04/04/17 1339   Coping/Psychosocial Response Interventions   Plan Of Care Reviewed With patient   Outcome Evaluation   Outcome Summary/Follow up Plan Pt presented to PT on ventilator and cortrack, able to open eyes and indicate answers to yes/no questions. Pt performed bed mobility with max A X2, once sitting she only required CGA to maintain up right posture and demonstrated ability to pump ankles and LAQ. Pt could only tolerate ~3min of sitting upright before needing to lay down and rest. Pt will benefit from skilled PT in order to increase strength and functional mobility independence so she can return to prior level of function.         Problem: Inpatient Physical Therapy  Goal: Bed Mobility Goal LTG- PT  Outcome: Ongoing (interventions implemented as appropriate)    04/04/17 1339   Bed Mobility PT LTG   Bed Mobility PT LTG, Date Established 04/04/17   Bed Mobility PT LTG, Time to Achieve 1 wk   Bed Mobility PT LTG, Activity Type all bed mobility   Bed Mobility PT LTG, Emery Level moderate assist (50% patient effort);2 person assist required       Goal: Static Sitting Balance Goal LTG- PT  Outcome: Ongoing (interventions implemented as appropriate)    04/04/17 1339   Static Sitting Balance PT LTG   Static Sitting Balance PT LTG, Date Established 04/04/17   Static Sitting Balance PT LTG, Time to Achieve 1 wk   Static Sitting Balance PT LTG, Emery Level supervision required

## 2017-04-04 NOTE — PROGRESS NOTES
Daily Progress Note.     [facility] CORONARY CARE    4/4/2017    Patient ID:  Name:  Lupe Becker  MRN:  [id number]  1950  66 y.o.  female            CC/Reason for visit: ARDS secondary to lobar pneumonia    Interval History:  Off ,   Still with low oxygenation on vent support.    Vitals:  Vitals:    04/04/17 0532 04/04/17 0602 04/04/17 0633 04/04/17 0650   BP: 103/59 117/76 133/70    BP Location:       Patient Position:       Pulse: 66 71 76 77   Resp:    22   Temp:       TempSrc:       SpO2: 93% 95% 96% 95%   Weight:       Height:         Body mass index is 37.18 kg/(m^2).    Intake/Output Summary (Last 24 hours) at 04/04/17 0736  Last data filed at 04/04/17 0317   Gross per 24 hour   Intake             2141 ml   Output             1425 ml   Net              716 ml     WEIGHTS:     Wt Readings from Last 1 Encounters:   04/03/17 2308 184 lb 1.4 oz (83.5 kg)   04/03/17 0300 185 lb 3 oz (84 kg)   04/02/17 0500 175 lb 14.8 oz (79.8 kg)   04/01/17 0602 175 lb 14.8 oz (79.8 kg)   03/31/17 2015 169 lb 6.4 oz (76.8 kg)   03/31/17 1210 165 lb (74.8 kg)     Ventilator/Non-Invasive Ventilation Settings     Start     Ordered    03/31/17 1757  Ventilator - AC/VC; (30); 100; 88%; Other (see comments); 18; 32  Continuous     Question Answer Comment   Vent Mode AC/VC    Breath rate  30   FiO2 100    FiO2 titrate for Sp02% =/> 88%    PEEP Other (see comments)    PEEP: 18    Tidal Volume 32        03/31/17 1825    03/31/17 1550  Ventilator - AC/VC; (30); 100; 92%; 10; 320  Continuous     Question Answer Comment   Vent Mode AC/VC    Breath rate  30   FiO2 100    FiO2 titrate for Sp02% =/> 92%    PEEP 10    Tidal Volume 320        03/31/17 1552    03/31/17 1231  . BIPAP; IPAP: 12; EPAP: 8; Titrate for spO2: 88% - 92%  Until Discontinued     Question Answer Comment   . BIPAP    IPAP 12    EPAP 8    Titrate for spO2 88% - 92%        03/31/17 1230          Exam:  GENERAL:  On ventilator calm, responsive to  questions  HEENT:  EOMI, nonicteric sclera, no JVD noted due to body habitus +ET tube ,RIJ central line CDI large short neck  PULMONARY:    Diffuse rhonchi bilaterally mech breath sounds  CARDIAC:  Tachy reg  ABD: obese SNTND BS+  EXT: no c/c/e, pulses symmetric 2+ bilaterally  NEURO:  Sedated on vent, responds to commands appropriately and opens eyes to voice  SKIN: no lesions, no rash    Scheduled meds:      azithromycin 500 mg Intravenous Q24H   chlorhexidine 15 mL Mouth/Throat Q12H   enoxaparin 40 mg Subcutaneous Nightly   pantoprazole 40 mg Intravenous Q AM   piperacillin-tazobactam 4.5 g Intravenous Q8H   vancomycin 1,000 mg Intravenous Q12H     IV meds:                          norepinephrine 0.02-0.3 mcg/kg/min Last Rate: Stopped (04/01/17 1815)   Pharmacy to dose vancomycin     phenylephrine 0.5-3 mcg/kg/min Last Rate: Stopped (03/31/17 2257)   propofol 5-50 mcg/kg/min Last Rate: 25 mcg/kg/min (04/04/17 0255)   vasopressin 0.03 Units/min        Data Review:   I reviewed the patient's medications and new clinical results.    Results from last 7 days  Lab Units 04/04/17  0338 04/03/17  0357 04/02/17  0942 04/01/17  0612 03/31/17  1236   WBC 10*3/mm3 10.97* 14.58* 20.51* 34.55* 38.92*   HEMOGLOBIN g/dL 9.2* 9.4* 9.5* 11.1* 13.1   PLATELETS 10*3/mm3 225 225 210 262 261       Results from last 7 days  Lab Units 04/04/17  0338 04/03/17  0357 04/02/17  0942 04/01/17  0425 03/31/17  1236   SODIUM mmol/L 142 141 138 135* 130*   POTASSIUM mmol/L 4.1 3.5 3.6 3.7 3.5   CHLORIDE mmol/L 105 104 102 98 86*   TOTAL CO2 mmol/L 27.5 26.1 25.0 21.7* 24.5   BUN mg/dL 15 21 21 28* 27*   CREATININE mg/dL 0.60 0.65 0.76 1.22* 1.20*   GLUCOSE mg/dL 98 127* 89 128* 186*   CALCIUM mg/dL 8.2* 8.0* 7.9* 7.9* 9.4   MAGNESIUM mg/dL  --   --   --  1.7  --    PHOSPHORUS mg/dL  --   --   --  2.6  --    Estimated Creatinine Clearance: 67.2 mL/min (by C-G formula based on Cr of 0.6).    Results from last 7 days  Lab Units 04/01/17  0725  04/01/17  0425 03/31/17  2310 03/31/17  1914 03/31/17  1236   LACTATE mmol/L 1.6 1.9 2.3* 2.6* 5.1*         IMAGING:  I have reviewed all imaging studies completed since prior note.  Imaging Results (most recent)     Procedure Component Value Units Date/Time    XR Chest 1 View [[id number]] Collected:  03/31/17 1349     Updated:  03/31/17 1353    Narrative:       EMERGENCY PORTAL CHEST SINGLE VIEW 13:20     HISTORY: Morbidly obese 66-year-old female with hypertension presents to  the ER for evaluation of shortness of breath and fever     COMPARISON: (none available)     FINDINGS:  1. Imaging is severely limited by body habitus and low lung volumes on  the right.  2. Cardiac enlargement vascular calcification prominent scoliosis and  spondylosis.  3. Right basilar opacification suspect pneumonia and effusion.  4. CT follow-up suggested.     This report was finalized on 3/31/2017 1:50 PM by Dr. Francisco J Hayes MD.       XR Chest 1 View [[id number]] Collected:  03/31/17 1653     Updated:  03/31/17 1701    Narrative:       XR CHEST 1 VW-     HISTORY: Female who is 66 years-old,  Central line and endotracheal tube  placement     TECHNIQUE: Frontal view of the chest     COMPARISON: 03/31/2017 1317 hours     FINDINGS: Endotracheal tube tip appears to be about 1 cm above the  isabel. IJ catheter extends to the superior vena cava. Heart is  enlarged. Atelectasis/infiltrate is apparent at the lung bases. There  may be minimal pleural effusions. No pneumothorax. No acute osseous  process.       Impression:       Endotracheal tube tip about 1 cm above the isabel. Right IJ  catheter, no pneumothorax. Otherwise, no significant change, continued  follow-up suggested.     Discussed by telephone with the patient's nurse, Idalia,  at time of  interpretation, 1700 on 03/31/2017.     This report was finalized on 3/31/2017 4:58 PM by Dr. Eric Beatty MD.       CT Chest Without Contrast [[id number]] Collected:  03/31/17 1814     Updated:   03/31/17 1943    Narrative:       CT CHEST WITHOUT CONTRAST     HISTORY: Possible pneumothorax. Sepsis.     TECHNIQUE: Chest CT includes axial imaging from the thoracic inlet to  the upper abdomen without IV contrast.     COMPARISON: AP chest 03/31/2017.     FINDINGS: Endotracheal tube tip is at the isabel and this could be  withdrawn 3 cm for better positioning. A right IJ central venous  catheter tip is in the SVC.     There is dense right lower lobe and middle lobe consolidation with near  complete consolidation. Air bronchograms are present. There is also  moderate to severe left lower lobe consolidation. Consolidation is also  present in the posterior right upper lobe. No upper lobe pulmonary edema  is present. There is marked cardiomegaly. Scoliotic curvature is present  in the thoracolumbar spine. Chronic multilevel posterior rib deformities  are present. Small right pleural effusion is present.     There is a left adrenal mass or potential hemorrhage measuring 2.8 x 2.2  cm.       Impression:       1. No pneumothorax.  2. Multilobar consolidation greatest within the lower lobes with air  bronchograms consistent with infiltrate/aspiration.  3. Endotracheal tube tip is at the isabel and this could be withdrawn 3  cm for better positioning.  4. Left adrenal mass or potential hemorrhage measuring approximately 2.8  x 2.2 cm.     Findings were discussed with Idalia, the nurse caring for the patient, on  03/31/2017 at 5:48 PM.     This report was finalized on 3/31/2017 7:40 PM by Dr. Angel Kimball MD.       XR Chest 1 View [[id number]] Collected:  04/01/17 0436     Updated:  04/01/17 0436    Narrative:       X-RAY CHEST 1 VIEW.     HISTORY: Follow-up pneumonia.     COMPARISON: 03/31/2017.     FINDINGS:  Mild cardiomegaly and low lung volumes. Lines and tubes are stable.     Consolidation in the lungs, right greater than left.              Impression:       Overall no significant improvement.                  ASSESSMENT /   PLAN:  Acute hypoxic and hypercapnic respiratory failure  CAP  Left adrenal mass versus hemorrhage  Septic Shock resolved  Leukocytosis  Transaminitis    -will stop azithromycin tomorrow - this can cause slight lft abn  -likely dc vancomycin after 7 days unless still spiking fevers  -if not doing well on sbt today would consider bronchoscopy or if still spiking fevers.  -continue abx, vent support  -mv reviewed, cxr today  TFs, corpak  continue to trend labs.  SBT if repeat cxr encouraging   -verify placement of et tube, very small   stature of pt limits ability to pull back et tube tooofar    Discussed with bedside RN plan of care and any care issues regarding this patient today.    Total critical care time was 35minutes, excluding any separately billable procedure time.    Charles Garcia MD    [facility]  04/04/17  7:42 AM

## 2017-04-04 NOTE — PLAN OF CARE
Problem: Patient Care Overview (Adult)  Goal: Plan of Care Review  Outcome: Ongoing (interventions implemented as appropriate)    04/04/17 3261   Outcome Evaluation   Outcome Summary/Follow up Plan pt remains on ventilator, decreased support. Possible extubation in am       Goal: Adult Individualization and Mutuality  Outcome: Ongoing (interventions implemented as appropriate)  Goal: Discharge Needs Assessment  Outcome: Ongoing (interventions implemented as appropriate)    Problem: Mechanical Ventilation, Invasive (Adult)  Goal: Signs and Symptoms of Listed Potential Problems Will be Absent or Manageable (Mechanical Ventilation, Invasive)  Outcome: Ongoing (interventions implemented as appropriate)    Problem: Respiratory Insufficiency (Adult)  Goal: Identify Related Risk Factors and Signs and Symptoms  Outcome: Ongoing (interventions implemented as appropriate)  Goal: Acid/Base Balance  Outcome: Ongoing (interventions implemented as appropriate)  Goal: Effective Ventilation  Outcome: Ongoing (interventions implemented as appropriate)    Problem: Fall Risk (Adult)  Goal: Identify Related Risk Factors and Signs and Symptoms  Outcome: Ongoing (interventions implemented as appropriate)  Goal: Absence of Falls  Outcome: Ongoing (interventions implemented as appropriate)    Problem: SAFETY - NON-VIOLENT RESTRAINT  Goal: Remains free of injury from restraints (Non-Violent Restraint)  Outcome: Ongoing (interventions implemented as appropriate)  Goal: Free from restraint(s) (Non-Violent Restraint)  Outcome: Ongoing (interventions implemented as appropriate)

## 2017-04-04 NOTE — PROGRESS NOTES
"Pharmacokinetic Consult - Vancomycin Dosing (Follow-up Note)  Lupe HOPPER Eugenio  Day 5  Indication: PNA  Requesting Physician: Dr. Rausch  Goal trough: 15-20 mg/L     Relevant clinical data and objective history reviewed:  66 y.o. female 59\" (149.9 cm) 184 lb 1.4 oz (83.5 kg)      Creatinine   Date Value Ref Range Status   04/04/2017 0.60 0.57 - 1.00 mg/dL Final   04/03/2017 0.65 0.57 - 1.00 mg/dL Final   04/02/2017 0.76 0.57 - 1.00 mg/dL Final     BUN   Date Value Ref Range Status   04/04/2017 15 8 - 23 mg/dL Final     Estimated Creatinine Clearance: 67.2 mL/min (by C-G formula based on Cr of 0.6).    Lab Results   Component Value Date    WBC 10.97 (H) 04/04/2017     Temp Readings from Last 3 Encounters:   04/04/17 99.8 °F (37.7 °C) (Oral)   10/10/16 97.7 °F (36.5 °C)   09/10/15 98.5 °F (36.9 °C) (Oral)         Baseline culture/source/susceptibility:  Blood Culture   Date Value Ref Range Status   03/31/2017 No growth at 3 days  Preliminary   03/31/2017 No growth at 3 days  Preliminary           Respiratory Culture   Date Value Ref Range Status   04/02/2017 No growth at 2 days  Final     MRSA SCREEN CX   Date Value Ref Range Status   03/31/2017   Final    No Methicillin Resistant Staphylococcus aureus isolated            Lab Results   Component Value Date    VANCOTROUGH 11.80 04/04/2017       Assessment/Plan    Patient is currently on vancomycin 1000mg q12h. This morning trough level came back at 11.80 (range 15-20). The level was drawn at 0818 on 4/4 approximately 8 hours from his last dose given at 0006 on 4/4.     Due to the late administration of the evening dose, this trough ended up being drawn \"earlier\" resulting in a level higher than the true value. However, even with this the patients level was lower than the target goal. Will increase the patients dose again to 1250 mg q12h (~30 mg/kg/day). Will plan another level to be drawn on 4/06 at 0930 prior to the 5th dose.     Will monitor serum creatinine at least " every 48 hours per dosing recommendations, level planned for tomorrow AM. Pharmacy will continue to follow daily while on vancomycin and adjust as needed.     Thank you for allowing me to participate in this patients care  Adalberto Noel, RavenD

## 2017-04-04 NOTE — PLAN OF CARE
Problem: Patient Care Overview (Adult)  Goal: Plan of Care Review  Outcome: Ongoing (interventions implemented as appropriate)    04/04/17 1013   Coping/Psychosocial Response Interventions   Plan Of Care Reviewed With patient;daughter   Outcome Evaluation   Outcome Summary/Follow up Plan continues to have diarrhea in spite of tube feed change frustration with hospital routine and prolonged intubation t max was 100 this shift cortrak replaced ok to use per phone by MD heart rate 70 in the sinus ocassional in the 50-70 pvc at times fent iv given three doses          Problem: Respiratory Insufficiency (Adult)  Goal: Identify Related Risk Factors and Signs and Symptoms  Outcome: Ongoing (interventions implemented as appropriate)    Problem: SAFETY - NON-VIOLENT RESTRAINT  Goal: Remains free of injury from restraints (Non-Violent Restraint)  Outcome: Ongoing (interventions implemented as appropriate)

## 2017-04-04 NOTE — PROGRESS NOTES
Discharge Plan Comments  4/3- Undetermined pending treatment course. Gilda Kang, LCSW    4/4  still intubated on vent.      Follow Up Needed  [employer] DRG APPROVED O/L 4/10/17       Weekend To Do

## 2017-04-04 NOTE — PROGRESS NOTES
Acute Care - Physical Therapy Initial Evaluation  [facility]     Patient Name: Lupe Becker  : 1950  MRN: [id number]  Today's Date: 2017   Onset of Illness/Injury or Date of Surgery Date: (P) 17  Date of Referral to PT: (P) 17  Referring Physician: Rausch (P)      Admit Date: 3/31/2017     Visit Dx:    ICD-10-CM ICD-9-CM   1. Sepsis, due to unspecified organism A41.9 038.9     995.91   2. Acute respiratory failure with hypoxia and hypercapnia J96.01 518.81    J96.02    3. Pneumonia of right lower lobe due to infectious organism J18.9 483.8   4. Leukocytosis, unspecified type D72.829 288.60   5. Generalized weakness R53.1 780.79     Patient Active Problem List   Diagnosis   • Accelerated essential hypertension   • Hypoxia   • Cervical fusion syndrome   • Abnormal presence of protein in urine   • Fast heart beat   • Ventricular pre-excitation with arrhythmia   • Adult onset hypothyroidism   • Anxiety about health   • Sepsis     Past Medical History:   Diagnosis Date   • Bicornate uterus 10/10/2016   • Breast lump 10/10/2016    Description: right   • Hypertension    • Mitral valve regurgitation      Past Surgical History:   Procedure Laterality Date   • APPENDECTOMY     • RHINOPLASTY     • TONSILLECTOMY            PT ASSESSMENT (last 72 hours)      PT Evaluation       17 1326 17 0400    Rehab Evaluation    Document Type (P)  evaluation  -     Subjective Information (P)  agree to therapy;unable to respond  -     Patient Effort, Rehab Treatment (P)  adequate  -CH     Symptoms Noted During/After Treatment (P)  shortness of breath   as seen via ventilator  -     General Information    Patient Profile Review (P)  yes  -CH     Onset of Illness/Injury or Date of Surgery Date (P)  17  -     Referring Physician (P)  Miracle  -     General Observations (P)  female pt, on ventilator, supine in bed, no acute distress noted  -     Pertinent History Of Current Problem (P)  pt  admitted to [facility] with acute respiratory failure, intubated and on cortrack  -     Precautions/Limitations (P)  oxygen therapy device and L/min;fall precautions  -     Prior Level of Function (P)  independent:;all household mobility;community mobility  -     Equipment Currently Used at Home (P)  none  -CH     Plans/Goals Discussed With (P)  patient  -CH     Living Environment    Lives With (P)  alone  -     Living Arrangements (P)  house  -     Clinical Impression    Date of Referral to PT (P)  04/04/17  -     PT Diagnosis (P)  generalized weakness  -     Patient/Family Goals Statement (P)  get out of bed  -     Criteria for Skilled Therapeutic Interventions Met (P)  yes  -CH     Impairments Found (describe specific impairments) (P)  ventilation and respiration/gas exchange;gait, locomotion, and balance  -     Rehab Potential (P)  fair, will monitor progress closely  -     Vital Signs    Pre Systolic BP Rehab (P)  114  -CH     Pre Treatment Diastolic BP (P)  76  -CH     Pretreatment Heart Rate (beats/min) (P)  73  -CH     Pre SpO2 (%) (P)  94  -CH     O2 Delivery Pre Treatment (P)  supplemental O2  -     Pain Assessment    Pain Assessment (P)  Unable to assess  -     Cognitive Assessment/Intervention    Current Cognitive/Communication Assessment (P)  could not assess  -     ROM (Range of Motion)    General ROM (P)  other (see comments)   difficult to assess due to lines/tubes  -     MMT (Manual Muscle Testing)    General MMT Assessment (P)  other (see comments)   did not assess; B DF and knee extension >3/5  -CH     Muscle Tone Assessment    Muscle Tone Assessment  Bilateral Upper Extremities;Bilateral Lower Extremities  -JW    Bilateral Upper Extremities Muscle Tone Assessment  mildly decreased tone  -JW    Bilateral Lower Extremities Muscle Tone Assessment  mildly decreased tone  -JW    Bed Mobility, Assessment/Treatment    Bed Mob, Supine to Sit, Holcomb (P)  maximum assist (25%  patient effort);2 person assist required  -     Bed Mob, Sit to Supine, O'Brien (P)  maximum assist (25% patient effort);2 person assist required  -     Transfer Assessment/Treatment    Transfers, Sit-Stand O'Brien (P)  not appropriate to assess  -     Gait Assessment/Treatment    Gait, O'Brien Level (P)  not appropriate to assess  -     Motor Skills/Interventions    Additional Documentation (P)  Balance Skills Training (Group)  -     Balance Skills Training    Sitting-Level of Assistance (P)  Contact guard  -     Sitting-Balance Support (P)  Right upper extremity supported;Left upper extremity supported  -     Sitting # of Minutes (P)  3  -CH     Positioning and Restraints    Pre-Treatment Position (P)  in bed  -CH     Post Treatment Position (P)  bed  -CH     In Bed (P)  supine;call light within reach;encouraged to call for assist;with nsg  -       04/04/17 0000 04/03/17 2000    Muscle Tone Assessment    Muscle Tone Assessment Bilateral Upper Extremities;Bilateral Lower Extremities  -JW Bilateral Upper Extremities;Bilateral Lower Extremities  -JW    Bilateral Upper Extremities Muscle Tone Assessment mildly decreased tone  -JW mildly decreased tone  -JW    Bilateral Lower Extremities Muscle Tone Assessment mildly decreased tone  -JW mildly decreased tone  -JW      04/03/17 1712 04/03/17 1600    General Information    Equipment Currently Used at Home none  -     Living Environment    Lives With alone  -     Living Arrangements house  -     Muscle Tone Assessment    Muscle Tone Assessment  Bilateral Upper Extremities;Bilateral Lower Extremities  -HA    Bilateral Upper Extremities Muscle Tone Assessment  mildly decreased tone  -HA    Bilateral Lower Extremities Muscle Tone Assessment  mildly decreased tone  -HA      04/03/17 1200 04/03/17 0800    Muscle Tone Assessment    Muscle Tone Assessment Bilateral Upper Extremities;Bilateral Lower Extremities  -HA Bilateral Upper  Extremities;Bilateral Lower Extremities  -HA    Bilateral Upper Extremities Muscle Tone Assessment mildly decreased tone  -HA mildly decreased tone  -HA    Bilateral Lower Extremities Muscle Tone Assessment mildly decreased tone  -HA mildly decreased tone  -HA      04/03/17 0400 04/03/17 0000    Muscle Tone Assessment    Muscle Tone Assessment Bilateral Upper Extremities;Bilateral Lower Extremities  -JW Bilateral Upper Extremities;Bilateral Lower Extremities  -JW    Bilateral Upper Extremities Muscle Tone Assessment mildly decreased tone  -JW mildly decreased tone  -JW    Bilateral Lower Extremities Muscle Tone Assessment mildly decreased tone  -JW mildly decreased tone  -JW      04/02/17 1935 04/02/17 0400    Muscle Tone Assessment    Muscle Tone Assessment Bilateral Upper Extremities;Bilateral Lower Extremities  -JW Bilateral Upper Extremities;Bilateral Lower Extremities  -JW    Bilateral Upper Extremities Muscle Tone Assessment mildly decreased tone  -JW mildly decreased tone  -JW    Bilateral Lower Extremities Muscle Tone Assessment mildly decreased tone  -JW mildly decreased tone  -JW      04/02/17 0000 04/01/17 2000    Muscle Tone Assessment    Muscle Tone Assessment Bilateral Upper Extremities;Bilateral Lower Extremities  -JW Bilateral Upper Extremities;Bilateral Lower Extremities  -JW    Bilateral Upper Extremities Muscle Tone Assessment mildly decreased tone  -JW mildly decreased tone  -JW    Bilateral Lower Extremities Muscle Tone Assessment mildly decreased tone  -JW mildly decreased tone  -JW      04/01/17 1900       Living Environment    Type of Financial/Environmental Concern none  -AL       User Key  (r) = Recorded By, (t) = Taken By, (c) = Cosigned By    Initials Name Provider Type    CONRAD Alvarez RN Registered Nurse    MEHNAZ Mann, RN Registered Nurse    DIMITRI Medina, RN Registered Nurse    JOSH Kang, MADELINE     KRISTIAN Torres, PT Student PT Student           Physical Therapy Education     Title: PT OT SLP Therapies (Active)     Topic: Physical Therapy (Active)     Point: Mobility training (Active)    Learning Progress Summary    Learner Readiness Method Response Comment Documented by Status   Patient Acceptance E NR   04/04/17 1338 Active                      User Key     Initials Effective Dates Name Provider Type Discipline     01/31/17 -  Celso Torres, PT Student PT Student PT                PT Recommendation and Plan  Anticipated Discharge Disposition: (P) inpatient rehabilitation facility  Planned Therapy Interventions: (P) balance training, bed mobility training, strengthening, transfer training  PT Frequency: (P) daily  Plan of Care Review  Plan Of Care Reviewed With: (P) patient  Outcome Summary/Follow up Plan: (P) Pt presented to PT on ventilator and cortrack, able to open eyes and indicate answers to yes/no questions. Pt performed bed mobility with max A X2, once sitting she only required CGA to maintain up right posture and demonstrated ability to pump ankles and LAQ. Pt could only tolerate ~3min of sitting upright before needing to lay down and rest. Pt will benefit from skilled PT in order to increase strength and functional mobility independence so she can return to prior level of function.          IP PT Goals       04/04/17 1339          Bed Mobility PT LTG    Bed Mobility PT LTG, Date Established (P)  04/04/17  -      Bed Mobility PT LTG, Time to Achieve (P)  1 wk  -      Bed Mobility PT LTG, Activity Type (P)  all bed mobility  -CH      Bed Mobility PT LTG, [place] Level (P)  moderate assist (50% patient effort);2 person assist required  -      Static Sitting Balance PT LTG    Static Sitting Balance PT LTG, Date Established (P)  04/04/17  -      Static Sitting Balance PT LTG, Time to Achieve (P)  1 wk  -      Static Sitting Balance PT LTG, [place] Level (P)  supervision required  -        User Key  (r) = Recorded  By, (t) = Taken By, (c) = Cosigned By    Initials Name Provider Type    CH Celso Torres, PT Student PT Student                Outcome Measures       04/04/17 1300          How much help from another person do you currently need...    Turning from your back to your side while in flat bed without using bedrails? (P)  2  -CH      Moving from lying on back to sitting on the side of a flat bed without bedrails? (P)  2  -CH      Moving to and from a bed to a chair (including a wheelchair)? (P)  1  -CH      Standing up from a chair using your arms (e.g., wheelchair, bedside chair)? (P)  1  -CH      Climbing 3-5 steps with a railing? (P)  1  -CH      To walk in hospital room? (P)  1  -CH      AM-PAC 6 Clicks Score (P)  8  -CH      Functional Assessment    Outcome Measure Options (P)  AM-PAC 6 Clicks Basic Mobility (PT)  -CH        User Key  (r) = Recorded By, (t) = Taken By, (c) = Cosigned By    Initials Name Provider Type    KRISTIAN Celso Torres, PT Student PT Student           Time Calculation:         PT Charges       04/04/17 1344          Time Calculation    Start Time (P)  1313  -      Stop Time (P)  1323  -      Time Calculation (min) (P)  10 min  -      PT Received On (P)  04/04/17  -      PT - Next Appointment (P)  04/05/17  -      PT Goal Re-Cert Due Date (P)  04/11/17  -        User Key  (r) = Recorded By, (t) = Taken By, (c) = Cosigned By    Initials Name Provider Type    KRISTIAN Celso Torres, PT Student PT Student          Therapy Charges for Today     Code Description Service Date Service Provider Modifiers Qty    83904449503 HC PT EVAL MOD COMPLEXITY 2 4/4/2017 Celso Torres, PT Student GP 1    96132209076 HC PT THER PROC EA 15 MIN 4/4/2017 Celso Torres PT Student GP 1    [id number]  PT THER SUPP EA 15 MIN 4/4/2017 Celso Torres PT Student GP 1          PT G-Codes  Outcome Measure Options: (P) AM-PAC 6 Clicks Basic Mobility (PT)      Celso Torres PT Student  4/4/2017

## 2017-04-05 ENCOUNTER — APPOINTMENT (OUTPATIENT)
Dept: GENERAL RADIOLOGY | Facility: HOSPITAL | Age: 67
End: 2017-04-05

## 2017-04-05 PROBLEM — I47.10 PAROXYSMAL SVT (SUPRAVENTRICULAR TACHYCARDIA): Status: ACTIVE | Noted: 2017-04-05

## 2017-04-05 PROBLEM — I47.1 PAROXYSMAL SVT (SUPRAVENTRICULAR TACHYCARDIA) (HCC): Status: ACTIVE | Noted: 2017-04-05

## 2017-04-05 LAB
ACANTHOCYTES BLD QL SMEAR: ABNORMAL
ALBUMIN SERPL-MCNC: 2.5 G/DL (ref 3.5–5.2)
ALBUMIN/GLOB SERPL: 0.8 G/DL
ALP SERPL-CCNC: 96 U/L (ref 39–117)
ALT SERPL W P-5'-P-CCNC: 82 U/L (ref 1–33)
ANION GAP SERPL CALCULATED.3IONS-SCNC: 9.7 MMOL/L
ARTERIAL PATENCY WRIST A: POSITIVE
ARTERIAL PATENCY WRIST A: POSITIVE
AST SERPL-CCNC: 66 U/L (ref 1–32)
ATMOSPHERIC PRESS: 737.4 MMHG
ATMOSPHERIC PRESS: 748.3 MMHG
BACTERIA SPEC AEROBE CULT: NORMAL
BACTERIA SPEC AEROBE CULT: NORMAL
BASE EXCESS BLDA CALC-SCNC: 2.2 MMOL/L (ref 0–2)
BASE EXCESS BLDA CALC-SCNC: 5.9 MMOL/L (ref 0–2)
BDY SITE: ABNORMAL
BDY SITE: ABNORMAL
BILIRUB SERPL-MCNC: 0.2 MG/DL (ref 0.1–1.2)
BUN BLD-MCNC: 17 MG/DL (ref 8–23)
BUN/CREAT SERPL: 30.4 (ref 7–25)
CALCIUM SPEC-SCNC: 8.3 MG/DL (ref 8.6–10.5)
CHLORIDE SERPL-SCNC: 106 MMOL/L (ref 98–107)
CO2 SERPL-SCNC: 29.3 MMOL/L (ref 22–29)
CREAT BLD-MCNC: 0.56 MG/DL (ref 0.57–1)
DEPRECATED RDW RBC AUTO: 53 FL (ref 37–54)
EOSINOPHIL # BLD MANUAL: 0.25 10*3/MM3 (ref 0–0.7)
EOSINOPHIL NFR BLD MANUAL: 2 % (ref 0.3–6.2)
ERYTHROCYTE [DISTWIDTH] IN BLOOD BY AUTOMATED COUNT: 16.2 % (ref 11.7–13)
GFR SERPL CREATININE-BSD FRML MDRD: 109 ML/MIN/1.73
GLOBULIN UR ELPH-MCNC: 3.2 GM/DL
GLUCOSE BLD-MCNC: 115 MG/DL (ref 65–99)
GLUCOSE BLDC GLUCOMTR-MCNC: 153 MG/DL (ref 70–130)
HCO3 BLDA-SCNC: 31.9 MMOL/L (ref 22–28)
HCO3 BLDA-SCNC: 33.1 MMOL/L (ref 22–28)
HCT VFR BLD AUTO: 31.8 % (ref 35.6–45.5)
HGB BLD-MCNC: 9.3 G/DL (ref 11.9–15.5)
HOROWITZ INDEX BLD+IHG-RTO: 50 %
HOROWITZ INDEX BLD+IHG-RTO: 70 %
LYMPHOCYTES # BLD MANUAL: 2.16 10*3/MM3 (ref 0.9–4.8)
LYMPHOCYTES NFR BLD MANUAL: 12 % (ref 5–12)
LYMPHOCYTES NFR BLD MANUAL: 17 % (ref 19.6–45.3)
MCH RBC QN AUTO: 26.3 PG (ref 26.9–32)
MCHC RBC AUTO-ENTMCNC: 29.2 G/DL (ref 32.4–36.3)
MCV RBC AUTO: 90.1 FL (ref 80.5–98.2)
METAMYELOCYTES NFR BLD MANUAL: 4 % (ref 0–0)
MODALITY: ABNORMAL
MODALITY: ABNORMAL
MONOCYTES # BLD AUTO: 1.53 10*3/MM3 (ref 0.2–1.2)
MYELOCYTES NFR BLD MANUAL: 1 % (ref 0–0)
NEUTROPHILS # BLD AUTO: 8.14 10*3/MM3 (ref 1.9–8.1)
NEUTROPHILS NFR BLD MANUAL: 64 % (ref 42.7–76)
O2 A-A PPRESDIFF RESPIRATORY: 0.2 MMHG
O2 A-A PPRESDIFF RESPIRATORY: 0.3 MMHG
PCO2 BLDA: 61.8 MM HG (ref 35–45)
PCO2 BLDA: 74.5 MM HG (ref 35–45)
PEEP RESPIRATORY: 5 CM[H2O]
PEEP RESPIRATORY: 7.5 CM[H2O]
PH BLDA: 7.24 PH UNITS (ref 7.35–7.45)
PH BLDA: 7.34 PH UNITS (ref 7.35–7.45)
PLAT MORPH BLD: NORMAL
PLATELET # BLD AUTO: 252 10*3/MM3 (ref 140–500)
PMV BLD AUTO: 11.4 FL (ref 6–12)
PO2 BLDA: 73.8 MM HG (ref 80–100)
PO2 BLDA: 76.3 MM HG (ref 80–100)
POTASSIUM BLD-SCNC: 3.9 MMOL/L (ref 3.5–5.2)
PROT SERPL-MCNC: 5.7 G/DL (ref 6–8.5)
RBC # BLD AUTO: 3.53 10*6/MM3 (ref 3.9–5.2)
SAO2 % BLDCOA: 90.8 % (ref 92–99)
SAO2 % BLDCOA: 93.7 % (ref 92–99)
SCAN SLIDE: NORMAL
SET MECH RESP RATE: 20
SET MECH RESP RATE: 20
SODIUM BLD-SCNC: 145 MMOL/L (ref 136–145)
STOMATOCYTES BLD QL SMEAR: ABNORMAL
TOTAL RATE: 20 BREATHS/MINUTE
TOTAL RATE: 23 BREATHS/MINUTE
VENTILATOR MODE: ABNORMAL
VENTILATOR MODE: AC
VT ON VENT VENT: 320 ML
VT ON VENT VENT: 320 ML
WBC MORPH BLD: NORMAL
WBC NRBC COR # BLD: 12.72 10*3/MM3 (ref 4.5–10.7)

## 2017-04-05 PROCEDURE — 25010000002 ENOXAPARIN PER 10 MG: Performed by: INTERNAL MEDICINE

## 2017-04-05 PROCEDURE — 74000 HC ABDOMEN KUB: CPT

## 2017-04-05 PROCEDURE — 25010000002 HYDRALAZINE PER 20 MG: Performed by: INTERNAL MEDICINE

## 2017-04-05 PROCEDURE — 87070 CULTURE OTHR SPECIMN AEROBIC: CPT | Performed by: INTERNAL MEDICINE

## 2017-04-05 PROCEDURE — 25010000002 FENTANYL CITRATE (PF) 100 MCG/2ML SOLUTION: Performed by: INTERNAL MEDICINE

## 2017-04-05 PROCEDURE — 80053 COMPREHEN METABOLIC PANEL: CPT | Performed by: INTERNAL MEDICINE

## 2017-04-05 PROCEDURE — 93005 ELECTROCARDIOGRAM TRACING: CPT | Performed by: INTERNAL MEDICINE

## 2017-04-05 PROCEDURE — 71010 HC CHEST PA OR AP: CPT

## 2017-04-05 PROCEDURE — 85025 COMPLETE CBC W/AUTO DIFF WBC: CPT | Performed by: INTERNAL MEDICINE

## 2017-04-05 PROCEDURE — 99232 SBSQ HOSP IP/OBS MODERATE 35: CPT | Performed by: NURSE PRACTITIONER

## 2017-04-05 PROCEDURE — 94799 UNLISTED PULMONARY SVC/PX: CPT

## 2017-04-05 PROCEDURE — 25010000002 NALOXONE PER 1 MG: Performed by: INTERNAL MEDICINE

## 2017-04-05 PROCEDURE — 36600 WITHDRAWAL OF ARTERIAL BLOOD: CPT

## 2017-04-05 PROCEDURE — 82962 GLUCOSE BLOOD TEST: CPT

## 2017-04-05 PROCEDURE — 87205 SMEAR GRAM STAIN: CPT | Performed by: INTERNAL MEDICINE

## 2017-04-05 PROCEDURE — 85007 BL SMEAR W/DIFF WBC COUNT: CPT | Performed by: INTERNAL MEDICINE

## 2017-04-05 PROCEDURE — 82803 BLOOD GASES ANY COMBINATION: CPT

## 2017-04-05 PROCEDURE — 93010 ELECTROCARDIOGRAM REPORT: CPT | Performed by: INTERNAL MEDICINE

## 2017-04-05 PROCEDURE — 94003 VENT MGMT INPAT SUBQ DAY: CPT

## 2017-04-05 PROCEDURE — 25010000002 PROPOFOL 1000 MG/ML EMULSION: Performed by: INTERNAL MEDICINE

## 2017-04-05 PROCEDURE — 25010000002 PIPERACILLIN SOD-TAZOBACTAM PER 1 G: Performed by: INTERNAL MEDICINE

## 2017-04-05 RX ORDER — BUMETANIDE 0.25 MG/ML
2 INJECTION INTRAMUSCULAR; INTRAVENOUS ONCE
Status: COMPLETED | OUTPATIENT
Start: 2017-04-05 | End: 2017-04-05

## 2017-04-05 RX ORDER — ONDANSETRON 2 MG/ML
4 INJECTION INTRAMUSCULAR; INTRAVENOUS EVERY 4 HOURS PRN
Status: DISCONTINUED | OUTPATIENT
Start: 2017-04-05 | End: 2017-04-08

## 2017-04-05 RX ORDER — DILTIAZEM HYDROCHLORIDE 5 MG/ML
INJECTION INTRAVENOUS
Status: COMPLETED
Start: 2017-04-05 | End: 2017-04-05

## 2017-04-05 RX ORDER — NALOXONE HCL 0.4 MG/ML
0.8 VIAL (ML) INJECTION ONCE
Status: COMPLETED | OUTPATIENT
Start: 2017-04-05 | End: 2017-04-05

## 2017-04-05 RX ORDER — CITALOPRAM 10 MG/1
10 TABLET ORAL DAILY
Status: DISCONTINUED | OUTPATIENT
Start: 2017-04-05 | End: 2017-04-06

## 2017-04-05 RX ORDER — HYDRALAZINE HYDROCHLORIDE 20 MG/ML
20 INJECTION INTRAMUSCULAR; INTRAVENOUS EVERY 6 HOURS PRN
Status: DISCONTINUED | OUTPATIENT
Start: 2017-04-05 | End: 2017-04-08

## 2017-04-05 RX ORDER — HYDROCODONE BITARTRATE AND ACETAMINOPHEN 7.5; 325 MG/1; MG/1
2 TABLET ORAL EVERY 6 HOURS PRN
Status: DISCONTINUED | OUTPATIENT
Start: 2017-04-05 | End: 2017-04-07

## 2017-04-05 RX ORDER — ACETYLCYSTEINE 200 MG/ML
4 SOLUTION ORAL; RESPIRATORY (INHALATION)
Status: DISCONTINUED | OUTPATIENT
Start: 2017-04-05 | End: 2017-04-05

## 2017-04-05 RX ORDER — ACETYLCYSTEINE 200 MG/ML
4 SOLUTION ORAL; RESPIRATORY (INHALATION)
Status: DISCONTINUED | OUTPATIENT
Start: 2017-04-05 | End: 2017-04-06

## 2017-04-05 RX ADMIN — PANTOPRAZOLE SODIUM 40 MG: 40 INJECTION, POWDER, FOR SOLUTION INTRAVENOUS at 06:04

## 2017-04-05 RX ADMIN — TAZOBACTAM SODIUM AND PIPERACILLIN SODIUM 3.38 G: 375; 3 INJECTION, SOLUTION INTRAVENOUS at 04:02

## 2017-04-05 RX ADMIN — FENTANYL CITRATE 75 MCG: 50 INJECTION INTRAMUSCULAR; INTRAVENOUS at 04:02

## 2017-04-05 RX ADMIN — HYDRALAZINE HYDROCHLORIDE 20 MG: 20 INJECTION INTRAMUSCULAR; INTRAVENOUS at 15:13

## 2017-04-05 RX ADMIN — ACETYLCYSTEINE 600 MG: 200 SOLUTION ORAL; RESPIRATORY (INHALATION) at 06:48

## 2017-04-05 RX ADMIN — FENTANYL CITRATE 75 MCG: 50 INJECTION INTRAMUSCULAR; INTRAVENOUS at 06:04

## 2017-04-05 RX ADMIN — ALBUTEROL SULFATE 6 PUFF: 90 AEROSOL, METERED RESPIRATORY (INHALATION) at 19:08

## 2017-04-05 RX ADMIN — DILTIAZEM HYDROCHLORIDE 10 MG/HR: 100 INJECTION, POWDER, LYOPHILIZED, FOR SOLUTION INTRAVENOUS at 14:10

## 2017-04-05 RX ADMIN — ALBUTEROL SULFATE 6 PUFF: 90 AEROSOL, METERED RESPIRATORY (INHALATION) at 14:43

## 2017-04-05 RX ADMIN — FENTANYL CITRATE 50 MCG: 50 INJECTION INTRAMUSCULAR; INTRAVENOUS at 07:58

## 2017-04-05 RX ADMIN — FENTANYL CITRATE 50 MCG: 50 INJECTION INTRAMUSCULAR; INTRAVENOUS at 10:59

## 2017-04-05 RX ADMIN — ALBUTEROL SULFATE 6 PUFF: 90 AEROSOL, METERED RESPIRATORY (INHALATION) at 06:48

## 2017-04-05 RX ADMIN — FENTANYL CITRATE 75 MCG: 50 INJECTION INTRAMUSCULAR; INTRAVENOUS at 15:47

## 2017-04-05 RX ADMIN — CHLORHEXIDINE GLUCONATE 15 ML: 1.2 RINSE ORAL at 09:28

## 2017-04-05 RX ADMIN — CITALOPRAM 10 MG: 10 TABLET ORAL at 13:09

## 2017-04-05 RX ADMIN — PROPOFOL 25 MCG/KG/MIN: 10 INJECTION, EMULSION INTRAVENOUS at 04:02

## 2017-04-05 RX ADMIN — DILTIAZEM HYDROCHLORIDE 10 MG: 5 INJECTION INTRAVENOUS at 14:07

## 2017-04-05 RX ADMIN — FENTANYL CITRATE 75 MCG: 50 INJECTION INTRAMUSCULAR; INTRAVENOUS at 02:59

## 2017-04-05 RX ADMIN — FENTANYL CITRATE 75 MCG: 50 INJECTION INTRAMUSCULAR; INTRAVENOUS at 22:05

## 2017-04-05 RX ADMIN — FENTANYL CITRATE 75 MCG: 50 INJECTION INTRAMUSCULAR; INTRAVENOUS at 13:49

## 2017-04-05 RX ADMIN — FENTANYL CITRATE 75 MCG: 50 INJECTION INTRAMUSCULAR; INTRAVENOUS at 20:42

## 2017-04-05 RX ADMIN — Medication 10 MG/HR: at 14:10

## 2017-04-05 RX ADMIN — ENOXAPARIN SODIUM 40 MG: 40 INJECTION SUBCUTANEOUS at 20:29

## 2017-04-05 RX ADMIN — HYDROCODONE BITARTRATE AND ACETAMINOPHEN 2 TABLET: 7.5; 325 TABLET ORAL at 13:09

## 2017-04-05 RX ADMIN — BUMETANIDE 2 MG: 0.25 INJECTION, SOLUTION INTRAMUSCULAR; INTRAVENOUS at 18:45

## 2017-04-05 RX ADMIN — CHLORHEXIDINE GLUCONATE 15 ML: 1.2 RINSE ORAL at 20:29

## 2017-04-05 RX ADMIN — BUMETANIDE 2 MG: 0.25 INJECTION, SOLUTION INTRAMUSCULAR; INTRAVENOUS at 17:10

## 2017-04-05 RX ADMIN — ALBUTEROL SULFATE 6 PUFF: 90 AEROSOL, METERED RESPIRATORY (INHALATION) at 12:10

## 2017-04-05 RX ADMIN — NALOXONE HYDROCHLORIDE 0.8 MG: 0.4 INJECTION, SOLUTION INTRAMUSCULAR; INTRAVENOUS; SUBCUTANEOUS at 15:13

## 2017-04-05 NOTE — NURSING NOTE
1320 Spoke with Dr. Xie at the desk informed that since propofol has been off, pts BP has remained high, her abdomen was more distended and she seemed more uncomfortable. MD ordered to stop all tube feeds, place NG, hydralazine, and get a KUB.  1353 After placing the [place]-sump nasally the pt was having a hard time recovering, pain medicine was administered, pt bit ETTube and became unresponsive as airway was occluded and sats dropped. Dr. Xie and charge nurse called to the bedside, see MD order bite block placed.  1400 Pts heart rate in the 200s, Dr. Xie called back to the bedside, see orders. Spoke with ORALIA Jimenez for [place] [facility]. Informed of Dr. John orders.   1405 Cardizem bolus given, 1408 pt back in SR. Cardiology consult called in.   Pts daughter updated at bedside.    Peewee Delvalle RN

## 2017-04-05 NOTE — PROGRESS NOTES
Dr. ADA Xie    [facility] CORONARY CARE    4/5/2017    Patient ID:  Name:  Lupe Becker  MRN:  [id number]  1950  66 y.o.  female            CC/Reason for visit: Follow-up for acute respiratory failure on chronic respiratory failure, ARDS, pneumonia    HPI: The patient remains intubated, mechanically ventilated.  She is off of pressors.  Her oxygenation has been improving.  I discussed the case with respiratory therapist and CCU nurse.  Her PEEP has been decreased, now at 5.  Her FiO2 is now down to 50%.    Mechanical ventilator settings were reviewed and adjusted by me today.    Vitals:  Vitals:    04/05/17 0700 04/05/17 0734 04/05/17 0803 04/05/17 0900   BP: 162/70  143/58    BP Location:       Patient Position:       Pulse: 85   84   Resp:       Temp:  99.4 °F (37.4 °C)     TempSrc:  Oral     SpO2: 95%   93%   Weight:       Height:         FiO2 (%): 50 %     Body mass index is 37.22 kg/(m^2).    Intake/Output Summary (Last 24 hours) at 04/05/17 0906  Last data filed at 04/05/17 0800   Gross per 24 hour   Intake          1639.68 ml   Output              825 ml   Net           814.68 ml       Exam:  GEN:  Obese, awake, nodding appropriately and following all commands, while intubated  EYES:    anicteric sclera bilat  ENT:    Oral exam shows endotracheal tube in place  NECK:  Very short chin, and very large neck, apparently difficult airway from what I can see at bedside  LUNGS: A few scattered rhonchi, but no wheezing, nonlabored breathing  CV:  Normal S1S2, without murmur, trace ankle edema  ABD:  Non tender, no enlarged liver or masses  EXT:  No cyanosis or clubbing    Scheduled meds:    acetylcysteine 600 mg Endotracheal Q12H   albuterol 6 puff Inhalation 4x Daily - RT   azithromycin 500 mg Intravenous Q24H   chlorhexidine 15 mL Mouth/Throat Q12H   enoxaparin 40 mg Subcutaneous Nightly   pantoprazole 40 mg Intravenous Q AM   piperacillin-tazobactam 3.375 g Intravenous Q8H   vancomycin  1,250 mg Intravenous Q12H     IV meds:                        norepinephrine 0.02-0.3 mcg/kg/min Last Rate: Stopped (04/01/17 1815)   Pharmacy to dose vancomycin     phenylephrine 0.5-3 mcg/kg/min Last Rate: Stopped (03/31/17 2257)   propofol 5-50 mcg/kg/min Last Rate: 25 mcg/kg/min (04/05/17 0402)   vasopressin 0.03 Units/min        Data Review:   I reviewed the patient's medications and new clinical results.  Lab Results   Component Value Date    CALCIUM 8.3 (L) 04/05/2017    PHOS 2.6 04/01/2017     Results from last 7 days  Lab Units 04/05/17  0420 04/04/17  0338 04/03/17  0357  04/01/17  0425   AST (SGOT) U/L 66* 48* 56*  < > 247*   MAGNESIUM mg/dL  --   --   --   --  1.7   SODIUM mmol/L 145 142 141  < > 135*   POTASSIUM mmol/L 3.9 4.1 3.5  < > 3.7   CHLORIDE mmol/L 106 105 104  < > 98   TOTAL CO2 mmol/L 29.3* 27.5 26.1  < > 21.7*   BUN mg/dL 17 15 21  < > 28*   CREATININE mg/dL 0.56* 0.60 0.65  < > 1.22*   GLUCOSE mg/dL 115* 98 127*  < > 128*   CALCIUM mg/dL 8.3* 8.2* 8.0*  < > 7.9*   WBC 10*3/mm3 12.72* 10.97* 14.58*  < >  --    HEMOGLOBIN g/dL 9.3* 9.2* 9.4*  < >  --    PLATELETS 10*3/mm3 252 225 225  < >  --    ALT (SGPT) U/L 82* 91* 100*  < > 192*   < > = values in this interval not displayed.      Results from last 7 days  Lab Units 04/05/17  0449   PH, ARTERIAL pH units 7.336*   PO2 ART mm Hg 76.3*   PCO2, ARTERIAL mm Hg 61.8*   HCO3 ART mmol/L 33.1*     Chest x-ray this morning: I personally visualized the images.  Very limited chest x-ray, inadequate for interpretation.  Difficult to visualize tip of endotracheal tube.  Still bilateral infiltrates, but very underinflated and large body habitus makes interpretation almost completely unreliable.    ASSESSMENT:   Acute on chronic hypoxic and hypercapnic respiratory failure  Community acquired pneumonia  Left adrenal mass  Elevated liver enzymes    Sepsis      PLAN:  Continue adjusting mechanical ventilator.  Chest x-ray this morning is of very poor  quality.  Adjust mode to spontaneous mode at this time, try spontaneous weaning trial, use pressure support of 6, FiO2 of 45%, and PEEP of 5.  Obtain parameters and blood gas within one hour.    Stop all antibiotics.  Patient has been in the hospital for 6 days with IV antibiotics, and absolutely no positive organisms.  If the patient spikes a fever, we will consider bronchoscopy.  If the patient spikes a fever, we will send sputum, urine and blood for culture.    Total critical care time 35 minutes, excluding any separately billable procedure time    Gerard Xie MD  4/5/2017

## 2017-04-05 NOTE — PROGRESS NOTES
Electrophysiology Follow-Up Note      Patient Name: Lupe Becker  Age/Sex: 66 y.o. female  : 1950  MRN: [id number]      Day of Service: 17       Chief Complaint/Follow-up: Ask to resee due to rapid heartrate, SVT      S: Intubated but awake and alert, following commands appropriately      Temp:  [98.4 °F (36.9 °C)-99.6 °F (37.6 °C)] 99.6 °F (37.6 °C)  Heart Rate:  [] 80  Resp:  [20-23] 20  BP: (106-224)/() 224/107  FiO2 (%):  [50 %] 50 %     PHYSICAL EXAM:    General Appearance: Intubated, noacute distress, obese female  Eyes: Conjunctiva and lids: No erythema, swelling, or discharge. Sclera non-icteric.   HENT: Atraumatic, short chin and very large neck  Respiratory: Intubated, few scattered rhonchi  Cardiovascular:  Heart Rate and Rhythm: Normal, Heart Sounds: Normal S1 and S2.   Murmurs: No murmurs noted. No rubs, thrills, or gallops.   Lower Extremities: No edema noted.  Gastrointestinal:  Abdomen soft, non-distended, non-tender   Musculoskeletal: Normal movement of extremities  Skin and Nails: General appearance normal. No pallor, cyanosis, diaphoresis. Skin temperature normal. No clubbing of fingernails.   Psychiatric: Patient alert and following commands and responding appropriately, she does appear a little anxious    ECG/TELE: Currently ST  rate 102 but strips reviewed of SVT with rates of 190-200's        Results from last 7 days  Lab Units 17  0338 17  0357   SODIUM mmol/L 145 142 141   POTASSIUM mmol/L 3.9 4.1 3.5   CHLORIDE mmol/L 106 105 104   TOTAL CO2 mmol/L 29.3* 27.5 26.1   BUN mg/dL 17 15 21   CREATININE mg/dL 0.56* 0.60 0.65   GLUCOSE mg/dL 115* 98 127*   CALCIUM mg/dL 8.3* 8.2* 8.0*       Results from last 7 days  Lab Units 17  04217  0338 17  0357   WBC 10*3/mm3 12.72* 10.97* 14.58*   HEMOGLOBIN g/dL 9.3* 9.2* 9.4*   HEMATOCRIT % 31.8* 31.0* 30.8*   PLATELETS 10*3/mm3 252 225 225           Results from last 7  days  Lab Units 04/01/17  0425 03/31/17  1914 03/31/17  1236   TROPONIN T ng/mL 0.019 0.059* 0.024       Results from last 7 days  Lab Units 04/01/17  0425   TSH mIU/mL 7.080*               Current Medications:   Scheduled Meds:    albuterol 6 puff Inhalation 4x Daily - RT   chlorhexidine 15 mL Mouth/Throat Q12H   citalopram 10 mg Oral Daily   enoxaparin 40 mg Subcutaneous Nightly   HYDROmorphone      HYDROmorphone 1 mg Intravenous Once   pantoprazole 40 mg Intravenous Q AM     Continuous Infusions:    norepinephrine 0.02-0.3 mcg/kg/min Last Rate: Stopped (04/01/17 1815)   phenylephrine 0.5-3 mcg/kg/min Last Rate: Stopped (03/31/17 2257)         Active Problems:    Sepsis    Paroxysmal SVT (supraventricular tachycardia)       Plan:     -Acute hypoxic respiratory failure, remains intubated  -PSVT that broke with cardizem, now on cardizem gtt, likely the same tachycardia that she has a history of but is exacerbated due to her current illness.   -Hx of WPW, s/p ablation 1998 (followed by Dr. Kammerling), recent office note notes recurrent tachycardia's that she has controlled over the years with vagal maneuvers and Valium, she was on metoprolol succinate prior to admission    Brief episode of PSVT that broke with cardizem bolus within seconds.  We will dc cardizem gtt and would recommend resuming her metoprolol succinate as soon as she is able to take po and in the meantime give PRN metoprolol for recurrent SVT episodes as necessary.     Elizabeth Harrell, JESSICA  04/05/17  4:09 PM

## 2017-04-06 LAB
ALBUMIN SERPL-MCNC: 2.9 G/DL (ref 3.5–5.2)
ALBUMIN/GLOB SERPL: 0.8 G/DL
ALP SERPL-CCNC: 98 U/L (ref 39–117)
ALT SERPL W P-5'-P-CCNC: 75 U/L (ref 1–33)
ANION GAP SERPL CALCULATED.3IONS-SCNC: 10.6 MMOL/L
ARTERIAL PATENCY WRIST A: ABNORMAL
ARTERIAL PATENCY WRIST A: POSITIVE
ARTERIAL PATENCY WRIST A: POSITIVE
AST SERPL-CCNC: 49 U/L (ref 1–32)
ATMOSPHERIC PRESS: 737 MMHG
ATMOSPHERIC PRESS: 740.9 MMHG
ATMOSPHERIC PRESS: 744.7 MMHG
BASE EXCESS BLDA CALC-SCNC: 11.8 MMOL/L (ref 0–2)
BASE EXCESS BLDA CALC-SCNC: 13 MMOL/L (ref 0–2)
BASE EXCESS BLDA CALC-SCNC: 13.5 MMOL/L (ref 0–2)
BDY SITE: ABNORMAL
BILIRUB SERPL-MCNC: 0.3 MG/DL (ref 0.1–1.2)
BUN BLD-MCNC: 17 MG/DL (ref 8–23)
BUN/CREAT SERPL: 32.1 (ref 7–25)
CALCIUM SPEC-SCNC: 8.4 MG/DL (ref 8.6–10.5)
CHLORIDE SERPL-SCNC: 98 MMOL/L (ref 98–107)
CO2 SERPL-SCNC: 34.4 MMOL/L (ref 22–29)
CREAT BLD-MCNC: 0.53 MG/DL (ref 0.57–1)
DEPRECATED RDW RBC AUTO: 50.7 FL (ref 37–54)
ERYTHROCYTE [DISTWIDTH] IN BLOOD BY AUTOMATED COUNT: 15.6 % (ref 11.7–13)
GFR SERPL CREATININE-BSD FRML MDRD: 116 ML/MIN/1.73
GLOBULIN UR ELPH-MCNC: 3.6 GM/DL
GLUCOSE BLD-MCNC: 109 MG/DL (ref 65–99)
HCO3 BLDA-SCNC: 37.3 MMOL/L (ref 22–28)
HCO3 BLDA-SCNC: 39.6 MMOL/L (ref 22–28)
HCO3 BLDA-SCNC: 42.6 MMOL/L (ref 22–28)
HCT VFR BLD AUTO: 32.3 % (ref 35.6–45.5)
HGB BLD-MCNC: 9.5 G/DL (ref 11.9–15.5)
HOROWITZ INDEX BLD+IHG-RTO: 45 %
HOROWITZ INDEX BLD+IHG-RTO: 55 %
HOROWITZ INDEX BLD+IHG-RTO: 90 %
LYMPHOCYTES # BLD MANUAL: 1.05 10*3/MM3 (ref 0.9–4.8)
LYMPHOCYTES NFR BLD MANUAL: 7 % (ref 19.6–45.3)
LYMPHOCYTES NFR BLD MANUAL: 8 % (ref 5–12)
MAGNESIUM SERPL-MCNC: 2.1 MG/DL (ref 1.6–2.4)
MCH RBC QN AUTO: 26 PG (ref 26.9–32)
MCHC RBC AUTO-ENTMCNC: 29.4 G/DL (ref 32.4–36.3)
MCV RBC AUTO: 88.3 FL (ref 80.5–98.2)
METAMYELOCYTES NFR BLD MANUAL: 2 % (ref 0–0)
MODALITY: ABNORMAL
MONOCYTES # BLD AUTO: 1.2 10*3/MM3 (ref 0.2–1.2)
MYELOCYTES NFR BLD MANUAL: 3 % (ref 0–0)
NEUTROPHILS # BLD AUTO: 12.04 10*3/MM3 (ref 1.9–8.1)
NEUTROPHILS NFR BLD MANUAL: 80 % (ref 42.7–76)
NT-PROBNP SERPL-MCNC: 3188 PG/ML (ref 5–900)
O2 A-A PPRESDIFF RESPIRATORY: 0.1 MMHG
O2 A-A PPRESDIFF RESPIRATORY: 0.3 MMHG
O2 A-A PPRESDIFF RESPIRATORY: 0.4 MMHG
PCO2 BLDA: 46 MM HG (ref 35–45)
PCO2 BLDA: 58.2 MM HG (ref 35–45)
PCO2 BLDA: 91.9 MM HG (ref 35–45)
PEEP RESPIRATORY: 7.5 CM[H2O]
PEEP RESPIRATORY: 7.5 CM[H2O]
PH BLDA: 7.27 PH UNITS (ref 7.35–7.45)
PH BLDA: 7.44 PH UNITS (ref 7.35–7.45)
PH BLDA: 7.52 PH UNITS (ref 7.35–7.45)
PHOSPHATE SERPL-MCNC: 3.4 MG/DL (ref 2.5–4.5)
PLAT MORPH BLD: NORMAL
PLATELET # BLD AUTO: 284 10*3/MM3 (ref 140–500)
PMV BLD AUTO: 10.4 FL (ref 6–12)
PO2 BLDA: 111.9 MM HG (ref 80–100)
PO2 BLDA: 77.3 MM HG (ref 80–100)
PO2 BLDA: 92.5 MM HG (ref 80–100)
POTASSIUM BLD-SCNC: 3.7 MMOL/L (ref 3.5–5.2)
PROCALCITONIN SERPL-MCNC: 0.85 NG/ML (ref 0.1–0.25)
PROT SERPL-MCNC: 6.5 G/DL (ref 6–8.5)
PSV: 8 CMH2O
RBC # BLD AUTO: 3.66 10*6/MM3 (ref 3.9–5.2)
SAO2 % BLDCOA: 92 % (ref 92–99)
SAO2 % BLDCOA: 97.2 % (ref 92–99)
SAO2 % BLDCOA: 98.8 % (ref 92–99)
SCAN SLIDE: NORMAL
SET MECH RESP RATE: 18
SET MECH RESP RATE: 20
SET MECH RESP RATE: 22
SODIUM BLD-SCNC: 143 MMOL/L (ref 136–145)
STOMATOCYTES BLD QL SMEAR: ABNORMAL
TOTAL RATE: 19 BREATHS/MINUTE
TOTAL RATE: 20 BREATHS/MINUTE
TOTAL RATE: 21 BREATHS/MINUTE
VENTILATOR MODE: ABNORMAL
VENTILATOR MODE: AC
VT ON VENT VENT: 320 ML
VT ON VENT VENT: 350 ML
WBC MORPH BLD: NORMAL
WBC NRBC COR # BLD: 15.05 10*3/MM3 (ref 4.5–10.7)

## 2017-04-06 PROCEDURE — 99233 SBSQ HOSP IP/OBS HIGH 50: CPT | Performed by: INTERNAL MEDICINE

## 2017-04-06 PROCEDURE — 94799 UNLISTED PULMONARY SVC/PX: CPT

## 2017-04-06 PROCEDURE — 25010000002 HYDRALAZINE PER 20 MG: Performed by: INTERNAL MEDICINE

## 2017-04-06 PROCEDURE — 84145 PROCALCITONIN (PCT): CPT | Performed by: INTERNAL MEDICINE

## 2017-04-06 PROCEDURE — 83880 ASSAY OF NATRIURETIC PEPTIDE: CPT | Performed by: INTERNAL MEDICINE

## 2017-04-06 PROCEDURE — 25010000002 ENOXAPARIN PER 10 MG: Performed by: INTERNAL MEDICINE

## 2017-04-06 PROCEDURE — 94660 CPAP INITIATION&MGMT: CPT

## 2017-04-06 PROCEDURE — 94640 AIRWAY INHALATION TREATMENT: CPT

## 2017-04-06 PROCEDURE — 82803 BLOOD GASES ANY COMBINATION: CPT

## 2017-04-06 PROCEDURE — 25010000002 METHYLPREDNISOLONE PER 125 MG: Performed by: INTERNAL MEDICINE

## 2017-04-06 PROCEDURE — 25010000002 FENTANYL CITRATE (PF) 100 MCG/2ML SOLUTION: Performed by: INTERNAL MEDICINE

## 2017-04-06 PROCEDURE — 85025 COMPLETE CBC W/AUTO DIFF WBC: CPT | Performed by: INTERNAL MEDICINE

## 2017-04-06 PROCEDURE — 25010000002 ONDANSETRON PER 1 MG: Performed by: INTERNAL MEDICINE

## 2017-04-06 PROCEDURE — 85007 BL SMEAR W/DIFF WBC COUNT: CPT | Performed by: INTERNAL MEDICINE

## 2017-04-06 PROCEDURE — 83735 ASSAY OF MAGNESIUM: CPT | Performed by: INTERNAL MEDICINE

## 2017-04-06 PROCEDURE — 36600 WITHDRAWAL OF ARTERIAL BLOOD: CPT

## 2017-04-06 PROCEDURE — 84100 ASSAY OF PHOSPHORUS: CPT | Performed by: INTERNAL MEDICINE

## 2017-04-06 PROCEDURE — 94003 VENT MGMT INPAT SUBQ DAY: CPT

## 2017-04-06 PROCEDURE — 80053 COMPREHEN METABOLIC PANEL: CPT | Performed by: INTERNAL MEDICINE

## 2017-04-06 RX ORDER — AMLODIPINE BESYLATE 10 MG/1
10 TABLET ORAL EVERY 24 HOURS
Status: DISCONTINUED | OUTPATIENT
Start: 2017-04-06 | End: 2017-04-08

## 2017-04-06 RX ORDER — BUMETANIDE 0.25 MG/ML
2 INJECTION INTRAMUSCULAR; INTRAVENOUS ONCE
Status: COMPLETED | OUTPATIENT
Start: 2017-04-06 | End: 2017-04-06

## 2017-04-06 RX ORDER — METHYLPREDNISOLONE SODIUM SUCCINATE 125 MG/2ML
80 INJECTION, POWDER, LYOPHILIZED, FOR SOLUTION INTRAMUSCULAR; INTRAVENOUS ONCE
Status: COMPLETED | OUTPATIENT
Start: 2017-04-06 | End: 2017-04-06

## 2017-04-06 RX ORDER — CALCIUM ACETATE MONOHYDRATE AND ALUMINUM SULFATE TETRADECAHYDRATE 952; 1347 MG/2299MG; MG/2299MG
1 POWDER, FOR SOLUTION TOPICAL EVERY 8 HOURS SCHEDULED
Status: DISCONTINUED | OUTPATIENT
Start: 2017-04-06 | End: 2017-04-08

## 2017-04-06 RX ORDER — SODIUM CHLORIDE 9 MG/ML
9 INJECTION, SOLUTION INTRAVENOUS CONTINUOUS
Status: DISCONTINUED | OUTPATIENT
Start: 2017-04-06 | End: 2017-04-14

## 2017-04-06 RX ORDER — ALBUTEROL SULFATE 2.5 MG/3ML
2.5 SOLUTION RESPIRATORY (INHALATION) EVERY 4 HOURS PRN
Status: DISCONTINUED | OUTPATIENT
Start: 2017-04-06 | End: 2017-04-08

## 2017-04-06 RX ORDER — LABETALOL HYDROCHLORIDE 5 MG/ML
20 INJECTION, SOLUTION INTRAVENOUS EVERY 8 HOURS PRN
Status: DISCONTINUED | OUTPATIENT
Start: 2017-04-06 | End: 2017-04-08

## 2017-04-06 RX ADMIN — METOPROLOL TARTRATE 2.5 MG: 5 INJECTION INTRAVENOUS at 15:25

## 2017-04-06 RX ADMIN — ALBUTEROL SULFATE 6 PUFF: 90 AEROSOL, METERED RESPIRATORY (INHALATION) at 08:16

## 2017-04-06 RX ADMIN — SODIUM CHLORIDE 9 ML/HR: 9 INJECTION, SOLUTION INTRAVENOUS at 05:11

## 2017-04-06 RX ADMIN — FENTANYL CITRATE 75 MCG: 50 INJECTION INTRAMUSCULAR; INTRAVENOUS at 05:58

## 2017-04-06 RX ADMIN — ALBUTEROL SULFATE 2.5 MG: 2.5 SOLUTION RESPIRATORY (INHALATION) at 11:51

## 2017-04-06 RX ADMIN — FENTANYL CITRATE 75 MCG: 50 INJECTION INTRAMUSCULAR; INTRAVENOUS at 00:39

## 2017-04-06 RX ADMIN — AMLODIPINE BESYLATE 10 MG: 10 TABLET ORAL at 18:29

## 2017-04-06 RX ADMIN — ENOXAPARIN SODIUM 40 MG: 40 INJECTION SUBCUTANEOUS at 20:38

## 2017-04-06 RX ADMIN — ONDANSETRON 4 MG: 2 INJECTION INTRAMUSCULAR; INTRAVENOUS at 12:16

## 2017-04-06 RX ADMIN — ALBUTEROL SULFATE 2.5 MG: 2.5 SOLUTION RESPIRATORY (INHALATION) at 23:56

## 2017-04-06 RX ADMIN — CALCIUM ACETATE MONOHYDRATE AND ALUMINUM SULFATE TETRADECAHYDRATE 1 PACKET: 952; 1347 POWDER, FOR SOLUTION TOPICAL at 16:24

## 2017-04-06 RX ADMIN — HYDRALAZINE HYDROCHLORIDE 20 MG: 20 INJECTION INTRAMUSCULAR; INTRAVENOUS at 22:46

## 2017-04-06 RX ADMIN — CALCIUM ACETATE MONOHYDRATE AND ALUMINUM SULFATE TETRADECAHYDRATE 1 PACKET: 952; 1347 POWDER, FOR SOLUTION TOPICAL at 21:56

## 2017-04-06 RX ADMIN — FENTANYL CITRATE 50 MCG: 50 INJECTION INTRAMUSCULAR; INTRAVENOUS at 02:45

## 2017-04-06 RX ADMIN — HYDRALAZINE HYDROCHLORIDE 20 MG: 20 INJECTION INTRAMUSCULAR; INTRAVENOUS at 10:47

## 2017-04-06 RX ADMIN — FENTANYL CITRATE 50 MCG: 50 INJECTION INTRAMUSCULAR; INTRAVENOUS at 12:16

## 2017-04-06 RX ADMIN — LABETALOL HYDROCHLORIDE 20 MG: 5 INJECTION, SOLUTION INTRAVENOUS at 15:20

## 2017-04-06 RX ADMIN — FENTANYL CITRATE 75 MCG: 50 INJECTION INTRAMUSCULAR; INTRAVENOUS at 04:57

## 2017-04-06 RX ADMIN — PANTOPRAZOLE SODIUM 40 MG: 40 INJECTION, POWDER, FOR SOLUTION INTRAVENOUS at 05:02

## 2017-04-06 RX ADMIN — CHLORHEXIDINE GLUCONATE 15 ML: 1.2 RINSE ORAL at 08:56

## 2017-04-06 RX ADMIN — BUMETANIDE 2 MG: 0.25 INJECTION INTRAMUSCULAR; INTRAVENOUS at 18:08

## 2017-04-06 RX ADMIN — METOPROLOL TARTRATE 2.5 MG: 5 INJECTION INTRAVENOUS at 20:38

## 2017-04-06 RX ADMIN — METOPROLOL TARTRATE 2.5 MG: 5 INJECTION INTRAVENOUS at 08:56

## 2017-04-06 RX ADMIN — ACETYLCYSTEINE 4 ML: 200 INHALANT RESPIRATORY (INHALATION) at 08:17

## 2017-04-06 RX ADMIN — CHLORHEXIDINE GLUCONATE 15 ML: 1.2 RINSE ORAL at 20:38

## 2017-04-06 RX ADMIN — Medication 1 APPLICATION: at 22:46

## 2017-04-06 RX ADMIN — METHYLPREDNISOLONE SODIUM SUCCINATE 80 MG: 125 INJECTION, POWDER, FOR SOLUTION INTRAMUSCULAR; INTRAVENOUS at 12:16

## 2017-04-06 NOTE — NURSING NOTE
CWOCN consult for patient's excoriated buttocks from stool. Spoke with RN- recommend to start Domeboros soaks H2lcnle and try magic butt cream at this time. Can use either the magic cream or zguard, whichever seems to stick better to the broken skin. This should improve with the domeboros as it will help dry the skin. We will check back with RN/patient.

## 2017-04-06 NOTE — PLAN OF CARE
Problem: Fall Risk (Adult)  Goal: Identify Related Risk Factors and Signs and Symptoms  Outcome: Outcome(s) achieved Date Met:  04/06/17

## 2017-04-06 NOTE — PROGRESS NOTES
"CC: SVT on ventilator awake.    Interval History:       Vital Signs  Temp:  [97.4 °F (36.3 °C)-99.6 °F (37.6 °C)] 97.4 °F (36.3 °C)  Heart Rate:  [] 67  Resp:  [20-23] 20  BP: (122-228)/() 154/80  FiO2 (%):  [45 %-65 %] 45 %    Intake/Output Summary (Last 24 hours) at 04/06/17 0740  Last data filed at 04/06/17 0451   Gross per 24 hour   Intake           948.84 ml   Output             4440 ml   Net         -3491.16 ml     Flowsheet Rows         First Filed Value    Admission Height  59\" (149.9 cm) Documented at 03/31/2017 1210    Admission Weight  165 lb (74.8 kg) Documented at 03/31/2017 1210          PHYSICAL EXAM:  General: No acute distress  Resp:NL Rate, unlabored,Diffuse scattered rhonchi  CV:NL rate and rhythm, NL PMI, Nl S1 and S2, no Mumur, no gallop, no rub, No JVD. Normal pedal pulses  ABD:Nl sounds, no masses or tenderness, nondistended, no quarding or rebound  Neuro: alert,cooperative and on ventilator  Extr: No edema or cyanosis, moves all extremities      Results Review:      Results from last 7 days  Lab Units 04/06/17  0609   SODIUM mmol/L 143   POTASSIUM mmol/L 3.7   CHLORIDE mmol/L 98   TOTAL CO2 mmol/L 34.4*   BUN mg/dL 17   CREATININE mg/dL 0.53*   GLUCOSE mg/dL 109*   CALCIUM mg/dL 8.4*       Results from last 7 days  Lab Units 04/01/17  0425 03/31/17  1914 03/31/17  1236   TROPONIN T ng/mL 0.019 0.059* 0.024       Results from last 7 days  Lab Units 04/06/17  0609   WBC 10*3/mm3 15.05*   HEMOGLOBIN g/dL 9.5*   HEMATOCRIT % 32.3*   PLATELETS 10*3/mm3 284               Results from last 7 days  Lab Units 04/06/17  0609   MAGNESIUM mg/dL 2.1         @LABRCNT(bnp)@  I reviewed the patient's new clinical results.  I personally viewed and interpreted the patient's EKG/Telemetry data        Medication Review:   Meds reviewed      norepinephrine 0.02-0.3 mcg/kg/min Last Rate: Stopped (04/01/17 3924)   phenylephrine 0.5-3 mcg/kg/min Last Rate: Stopped (03/31/17 4258)   sodium chloride 9 " mL/hr Last Rate: 9 mL/hr (04/06/17 0511)       Assessment/Plan  1. Respiratory failure what appears to be pneumonia with sepsis. On ventilator per pulmonary  2. 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  3. Hypothyroidism.  4. Mild aortic insufficiency mild mitral insufficiency.  5. Equivocal troponin. No acute event.  6.  SVT.  Will start on some IV metoprolol.  Cardiac      Trace Saleh MD  04/06/17  7:40 AM

## 2017-04-06 NOTE — PLAN OF CARE
Problem: Patient Care Overview (Adult)  Goal: Plan of Care Review    04/06/17 0645   Patient Care Overview   Progress progress toward functional goals is gradual   Outcome Evaluation   Outcome Summary/Follow up Plan REMAINS ON VENT. O-2 DECREASED TO 45% THIS SHIFT BY RT. VERY MINIMAL OUTPUT FROM NG TUBE. GOOD URINE OUTPUT. HAS RECEIVED FENTANYL AT FREQUENT INTERVALS TONIGHT.

## 2017-04-06 NOTE — PROGRESS NOTES
Dr. ADA Xie    [facility] CORONARY CARE    4/6/2017    Patient ID:  Name:  Lupe Becker  MRN:  [id number]  1950  66 y.o.  female            CC/Reason for visit: Follow-up for acute respiratory failure on chronic respiratory failure, ARDS, pneumonia    HPI: Patient is intubated, mechanically ventilated.  She is awake, alert, following all commands.  She is not on any pressors.  She had supraventricular tachycardia last night, requiring cardiology consultation.  She became hypotension temporarily, requiring IV pressors.  She also developed significant hypoxemia last night, and I had to increase her FiO2 back to 70%.  This morning she looks better and sounds better.    Mechanical ventilation settings were reviewed and adjusted by me today.    Vitals:  Vitals:    04/06/17 0702 04/06/17 0800 04/06/17 0803 04/06/17 0902   BP: 154/80  156/76 156/75   BP Location:       Patient Position:       Pulse: 67  65 66   Resp:  22     Temp:       TempSrc:       SpO2: 95%  96% 95%   Weight:       Height:         FiO2 (%): 45 %     Body mass index is 35.8 kg/(m^2).    Intake/Output Summary (Last 24 hours) at 04/06/17 1006  Last data filed at 04/06/17 0900   Gross per 24 hour   Intake           874.84 ml   Output             4440 ml   Net         -3565.16 ml       Exam:  GEN:  Awake, alert, on the ventilator, following all commands appropriately.  EYES:   EOM-i, anicteric sclera bilat  ENT:    Oral exam shows endotracheal tube in good position  NECK:  She has a webbed neck, very small chin  LUNGS: A few scattered rhonchi, but no actual wheezes., nonlabored breathing  CV:  Normal S1S2, without murmur, no edema  ABD:  Overweight, nontender  EXT:  No cyanosis or clubbing    Scheduled meds:    acetylcysteine 4 mL Nebulization Q12H - RT   albuterol 6 puff Inhalation 4x Daily - RT   chlorhexidine 15 mL Mouth/Throat Q12H   citalopram 10 mg Oral Daily   enoxaparin 40 mg Subcutaneous Nightly   HYDROmorphone 1 mg  Intravenous Once   metoprolol 2.5 mg Intravenous Q6H   pantoprazole 40 mg Intravenous Q AM     IV meds:                        norepinephrine 0.02-0.3 mcg/kg/min Last Rate: Stopped (04/01/17 1815)   phenylephrine 0.5-3 mcg/kg/min Last Rate: Stopped (03/31/17 2257)   sodium chloride 9 mL/hr Last Rate: 9 mL/hr (04/06/17 0511)       Data Review:   I reviewed the patient's medications and new clinical results.  Lab Results   Component Value Date    CALCIUM 8.4 (L) 04/06/2017    PHOS 3.4 04/06/2017     Results from last 7 days  Lab Units 04/06/17  0609 04/05/17  0420 04/04/17  0338  04/01/17  0425   AST (SGOT) U/L 49* 66* 48*  < > 247*   MAGNESIUM mg/dL 2.1  --   --   --  1.7   SODIUM mmol/L 143 145 142  < > 135*   POTASSIUM mmol/L 3.7 3.9 4.1  < > 3.7   CHLORIDE mmol/L 98 106 105  < > 98   TOTAL CO2 mmol/L 34.4* 29.3* 27.5  < > 21.7*   BUN mg/dL 17 17 15  < > 28*   CREATININE mg/dL 0.53* 0.56* 0.60  < > 1.22*   GLUCOSE mg/dL 109* 115* 98  < > 128*   CALCIUM mg/dL 8.4* 8.3* 8.2*  < > 7.9*   WBC 10*3/mm3 15.05* 12.72* 10.97*  < >  --    HEMOGLOBIN g/dL 9.5* 9.3* 9.2*  < >  --    PLATELETS 10*3/mm3 284 252 225  < >  --    ALT (SGPT) U/L 75* 82* 91*  < > 192*   < > = values in this interval not displayed.    Results from last 7 days  Lab Units 04/01/17  0425 03/31/17  1914 03/31/17  1236   TROPONIN T ng/mL 0.019 0.059* 0.024       Results from last 7 days  Lab Units 04/06/17  0354   PH, ARTERIAL pH units 7.441   PO2 ART mm Hg 92.5   PCO2, ARTERIAL mm Hg 58.2*   HCO3 ART mmol/L 39.6*         ASSESSMENT:   Acute on chronic hypoxic and hypercapnic respiratory failure  Community acquired pneumonia  Left adrenal mass  Elevated liver enzymes  Sepsis    Paroxysmal SVT (supraventricular tachycardia)  Scoliosis    PLAN:  We will adjust mechanical ventilator right now, try her on a lower than usual spontaneous pressure support, a 4 cm of water.  I will try to extubate her today if possible.    Continue beta blocker as per cardiology  for her SVT.    Her pneumonia was adequately treated for 5 days with intravenous antibiotics.  We stopped all antibiotics yesterday.    We will continue to monitor her pulmonary status closely, and treat volume overload if needed.  All cultures have been negative to date since admission.    She will continue on bronchodilators and perhaps low-dose steroids for some bronchospasm.    She does have chronic respiratory failure due to severe scoliosis of the spine, which compromises her respiratory mechanics.    Total critical care time 35 minutes, excluding any separately billable procedure time    Gerard Xie MD  4/6/2017

## 2017-04-06 NOTE — PLAN OF CARE
Problem: Patient Care Overview (Adult)  Goal: Plan of Care Review  Outcome: Ongoing (interventions implemented as appropriate)    04/06/17 5734   Coping/Psychosocial Response Interventions   Plan Of Care Reviewed With patient;daughter   Patient Care Overview   Progress progress towards functional goals is fair   Outcome Evaluation   Outcome Summary/Follow up Plan Pt was extubated today, but maintaining O2 sat in the upper 80's to low 90's. At rest, Increased work for breathing is causing HTN with sys >160-190 and Tachycardia in the 110's. Pt continues to have loose BMs and illius present with NG tube in place to low wall suction. Wound care saw pt today and added treatments to promote healing. PT did not work w patient today since pt was not stable enough to endure physical activity.        Goal: Adult Individualization and Mutuality  Outcome: Ongoing (interventions implemented as appropriate)  Goal: Discharge Needs Assessment  Outcome: Ongoing (interventions implemented as appropriate)    Problem: Mechanical Ventilation, Invasive (Adult)  Goal: Signs and Symptoms of Listed Potential Problems Will be Absent or Manageable (Mechanical Ventilation, Invasive)  Outcome: Outcome(s) achieved Date Met:  04/06/17    Problem: Respiratory Insufficiency (Adult)  Goal: Identify Related Risk Factors and Signs and Symptoms  Outcome: Ongoing (interventions implemented as appropriate)  Goal: Acid/Base Balance  Outcome: Ongoing (interventions implemented as appropriate)  Goal: Effective Ventilation  Outcome: Ongoing (interventions implemented as appropriate)    Problem: Fall Risk (Adult)  Goal: Absence of Falls  Outcome: Ongoing (interventions implemented as appropriate)    Problem: Skin Integrity Impairment, Risk/Actual (Adult)  Goal: Identify Related Risk Factors and Signs and Symptoms  Outcome: Ongoing (interventions implemented as appropriate)  Goal: Skin Integrity/Wound Healing  Outcome: Ongoing (interventions implemented as  appropriate)

## 2017-04-06 NOTE — SIGNIFICANT NOTE
04/06/17 1541   Rehab Treatment   Discipline physical therapist   Treatment Not Performed other (see comments)  (ORALIA Hale declines tx today, sats at 87% at rest )   Recommendation   PT - Next Appointment 04/07/17

## 2017-04-07 LAB
ALBUMIN SERPL-MCNC: 3.3 G/DL (ref 3.5–5.2)
ALBUMIN/GLOB SERPL: 0.9 G/DL
ALP SERPL-CCNC: 103 U/L (ref 39–117)
ALT SERPL W P-5'-P-CCNC: 63 U/L (ref 1–33)
ANION GAP SERPL CALCULATED.3IONS-SCNC: 10.9 MMOL/L
ARTERIAL PATENCY WRIST A: POSITIVE
AST SERPL-CCNC: 32 U/L (ref 1–32)
ATMOSPHERIC PRESS: 747.9 MMHG
ATMOSPHERIC PRESS: 750.8 MMHG
ATMOSPHERIC PRESS: 751.7 MMHG
BACTERIA SPEC RESP CULT: NO GROWTH
BASE EXCESS BLDA CALC-SCNC: 11.9 MMOL/L (ref 0–2)
BASE EXCESS BLDA CALC-SCNC: 13 MMOL/L (ref 0–2)
BASE EXCESS BLDA CALC-SCNC: 14 MMOL/L (ref 0–2)
BASOPHILS # BLD AUTO: 0.04 10*3/MM3 (ref 0–0.2)
BASOPHILS NFR BLD AUTO: 0.2 % (ref 0–1.5)
BDY SITE: ABNORMAL
BILIRUB SERPL-MCNC: 0.3 MG/DL (ref 0.1–1.2)
BUN BLD-MCNC: 23 MG/DL (ref 8–23)
BUN/CREAT SERPL: 44.2 (ref 7–25)
CALCIUM SPEC-SCNC: 9.3 MG/DL (ref 8.6–10.5)
CHLORIDE SERPL-SCNC: 96 MMOL/L (ref 98–107)
CO2 SERPL-SCNC: 39.1 MMOL/L (ref 22–29)
CREAT BLD-MCNC: 0.52 MG/DL (ref 0.57–1)
DEPRECATED RDW RBC AUTO: 53.6 FL (ref 37–54)
EOSINOPHIL # BLD AUTO: 0.01 10*3/MM3 (ref 0–0.7)
EOSINOPHIL NFR BLD AUTO: 0 % (ref 0.3–6.2)
ERYTHROCYTE [DISTWIDTH] IN BLOOD BY AUTOMATED COUNT: 16.2 % (ref 11.7–13)
GFR SERPL CREATININE-BSD FRML MDRD: 118 ML/MIN/1.73
GLOBULIN UR ELPH-MCNC: 3.7 GM/DL
GLUCOSE BLD-MCNC: 121 MG/DL (ref 65–99)
GRAM STN SPEC: NORMAL
HCO3 BLDA-SCNC: 40.7 MMOL/L (ref 22–28)
HCO3 BLDA-SCNC: 41.2 MMOL/L (ref 22–28)
HCO3 BLDA-SCNC: 42.9 MMOL/L (ref 22–28)
HCT VFR BLD AUTO: 36.5 % (ref 35.6–45.5)
HGB BLD-MCNC: 10.4 G/DL (ref 11.9–15.5)
HOROWITZ INDEX BLD+IHG-RTO: 50 %
HOROWITZ INDEX BLD+IHG-RTO: 75 %
HOROWITZ INDEX BLD+IHG-RTO: 80 %
IMM GRANULOCYTES # BLD: 2.15 10*3/MM3 (ref 0–0.03)
IMM GRANULOCYTES NFR BLD: 10.2 % (ref 0–0.5)
LYMPHOCYTES # BLD AUTO: 1.12 10*3/MM3 (ref 0.9–4.8)
LYMPHOCYTES NFR BLD AUTO: 5.3 % (ref 19.6–45.3)
MCH RBC QN AUTO: 26.1 PG (ref 26.9–32)
MCHC RBC AUTO-ENTMCNC: 28.5 G/DL (ref 32.4–36.3)
MCV RBC AUTO: 91.7 FL (ref 80.5–98.2)
MODALITY: ABNORMAL
MONOCYTES # BLD AUTO: 1.89 10*3/MM3 (ref 0.2–1.2)
MONOCYTES NFR BLD AUTO: 9 % (ref 5–12)
NEUTROPHILS # BLD AUTO: 15.83 10*3/MM3 (ref 1.9–8.1)
NEUTROPHILS NFR BLD AUTO: 75.3 % (ref 42.7–76)
O2 A-A PPRESDIFF RESPIRATORY: 0.1 MMHG
O2 A-A PPRESDIFF RESPIRATORY: 0.2 MMHG
O2 A-A PPRESDIFF RESPIRATORY: 0.2 MMHG
PCO2 BLDA: 72.2 MM HG (ref 35–45)
PCO2 BLDA: 75.8 MM HG (ref 35–45)
PCO2 BLDA: 77.1 MM HG (ref 35–45)
PH BLDA: 7.34 PH UNITS (ref 7.35–7.45)
PH BLDA: 7.35 PH UNITS (ref 7.35–7.45)
PH BLDA: 7.36 PH UNITS (ref 7.35–7.45)
PLATELET # BLD AUTO: 361 10*3/MM3 (ref 140–500)
PMV BLD AUTO: 10.7 FL (ref 6–12)
PO2 BLDA: 58.7 MM HG (ref 80–100)
PO2 BLDA: 61.2 MM HG (ref 80–100)
PO2 BLDA: 84.4 MM HG (ref 80–100)
POTASSIUM BLD-SCNC: 3.8 MMOL/L (ref 3.5–5.2)
PROT SERPL-MCNC: 7 G/DL (ref 6–8.5)
RBC # BLD AUTO: 3.98 10*6/MM3 (ref 3.9–5.2)
SAO2 % BLDCOA: 87.5 % (ref 92–99)
SAO2 % BLDCOA: 88.2 % (ref 92–99)
SAO2 % BLDCOA: 94.9 % (ref 92–99)
SET MECH RESP RATE: 18
SET MECH RESP RATE: 18
SET MECH RESP RATE: 20
SODIUM BLD-SCNC: 146 MMOL/L (ref 136–145)
TOTAL RATE: 16 BREATHS/MINUTE
TOTAL RATE: 18 BREATHS/MINUTE
TOTAL RATE: 20 BREATHS/MINUTE
VT ON VENT VENT: 251 ML
VT ON VENT VENT: 290 ML
WBC NRBC COR # BLD: 21.04 10*3/MM3 (ref 4.5–10.7)

## 2017-04-07 PROCEDURE — 85025 COMPLETE CBC W/AUTO DIFF WBC: CPT | Performed by: INTERNAL MEDICINE

## 2017-04-07 PROCEDURE — 25010000002 ONDANSETRON PER 1 MG: Performed by: INTERNAL MEDICINE

## 2017-04-07 PROCEDURE — 94660 CPAP INITIATION&MGMT: CPT

## 2017-04-07 PROCEDURE — 94799 UNLISTED PULMONARY SVC/PX: CPT

## 2017-04-07 PROCEDURE — 25010000002 HYDRALAZINE PER 20 MG: Performed by: INTERNAL MEDICINE

## 2017-04-07 PROCEDURE — 25010000002 FENTANYL CITRATE (PF) 100 MCG/2ML SOLUTION: Performed by: INTERNAL MEDICINE

## 2017-04-07 PROCEDURE — 82803 BLOOD GASES ANY COMBINATION: CPT

## 2017-04-07 PROCEDURE — 80053 COMPREHEN METABOLIC PANEL: CPT | Performed by: INTERNAL MEDICINE

## 2017-04-07 PROCEDURE — 36600 WITHDRAWAL OF ARTERIAL BLOOD: CPT

## 2017-04-07 PROCEDURE — 99233 SBSQ HOSP IP/OBS HIGH 50: CPT | Performed by: INTERNAL MEDICINE

## 2017-04-07 PROCEDURE — 97110 THERAPEUTIC EXERCISES: CPT

## 2017-04-07 PROCEDURE — 25010000002 ENOXAPARIN PER 10 MG: Performed by: INTERNAL MEDICINE

## 2017-04-07 PROCEDURE — 92610 EVALUATE SWALLOWING FUNCTION: CPT | Performed by: SPEECH-LANGUAGE PATHOLOGIST

## 2017-04-07 RX ORDER — METOPROLOL TARTRATE 50 MG/1
50 TABLET, FILM COATED ORAL EVERY 12 HOURS SCHEDULED
Status: DISCONTINUED | OUTPATIENT
Start: 2017-04-07 | End: 2017-04-08

## 2017-04-07 RX ORDER — ALBUTEROL SULFATE 2.5 MG/3ML
2.5 SOLUTION RESPIRATORY (INHALATION)
Status: DISCONTINUED | OUTPATIENT
Start: 2017-04-07 | End: 2017-04-08

## 2017-04-07 RX ORDER — BUMETANIDE 0.25 MG/ML
1 INJECTION INTRAMUSCULAR; INTRAVENOUS ONCE
Status: COMPLETED | OUTPATIENT
Start: 2017-04-07 | End: 2017-04-07

## 2017-04-07 RX ADMIN — LABETALOL HYDROCHLORIDE 20 MG: 5 INJECTION, SOLUTION INTRAVENOUS at 12:18

## 2017-04-07 RX ADMIN — BUMETANIDE 1 MG: 0.25 INJECTION INTRAMUSCULAR; INTRAVENOUS at 12:19

## 2017-04-07 RX ADMIN — CHLORHEXIDINE GLUCONATE 15 ML: 1.2 RINSE ORAL at 09:17

## 2017-04-07 RX ADMIN — CALCIUM ACETATE MONOHYDRATE AND ALUMINUM SULFATE TETRADECAHYDRATE 1 PACKET: 952; 1347 POWDER, FOR SOLUTION TOPICAL at 14:16

## 2017-04-07 RX ADMIN — CALCIUM ACETATE MONOHYDRATE AND ALUMINUM SULFATE TETRADECAHYDRATE 1 PACKET: 952; 1347 POWDER, FOR SOLUTION TOPICAL at 06:01

## 2017-04-07 RX ADMIN — METOPROLOL TARTRATE 2.5 MG: 5 INJECTION INTRAVENOUS at 01:52

## 2017-04-07 RX ADMIN — ALBUTEROL SULFATE 2.5 MG: 2.5 SOLUTION RESPIRATORY (INHALATION) at 19:36

## 2017-04-07 RX ADMIN — HYDRALAZINE HYDROCHLORIDE 20 MG: 20 INJECTION INTRAMUSCULAR; INTRAVENOUS at 14:24

## 2017-04-07 RX ADMIN — ENOXAPARIN SODIUM 40 MG: 40 INJECTION SUBCUTANEOUS at 21:03

## 2017-04-07 RX ADMIN — NICARDIPINE HYDROCHLORIDE 5 MG/HR: 25 INJECTION INTRAVENOUS at 17:47

## 2017-04-07 RX ADMIN — METOPROLOL TARTRATE 50 MG: 50 TABLET ORAL at 20:59

## 2017-04-07 RX ADMIN — ALBUTEROL SULFATE 2.5 MG: 2.5 SOLUTION RESPIRATORY (INHALATION) at 07:48

## 2017-04-07 RX ADMIN — FENTANYL CITRATE 50 MCG: 50 INJECTION INTRAMUSCULAR; INTRAVENOUS at 00:51

## 2017-04-07 RX ADMIN — SODIUM CHLORIDE 9 ML/HR: 9 INJECTION, SOLUTION INTRAVENOUS at 06:01

## 2017-04-07 RX ADMIN — METOPROLOL TARTRATE 50 MG: 50 TABLET ORAL at 09:17

## 2017-04-07 RX ADMIN — Medication 1 APPLICATION: at 05:00

## 2017-04-07 RX ADMIN — FENTANYL CITRATE 75 MCG: 50 INJECTION INTRAMUSCULAR; INTRAVENOUS at 04:59

## 2017-04-07 RX ADMIN — Medication 1 APPLICATION: at 06:52

## 2017-04-07 RX ADMIN — ONDANSETRON 4 MG: 2 INJECTION INTRAMUSCULAR; INTRAVENOUS at 20:33

## 2017-04-07 RX ADMIN — FENTANYL CITRATE 25 MCG: 50 INJECTION INTRAMUSCULAR; INTRAVENOUS at 14:56

## 2017-04-07 RX ADMIN — ALBUTEROL SULFATE 2.5 MG: 2.5 SOLUTION RESPIRATORY (INHALATION) at 13:26

## 2017-04-07 RX ADMIN — FENTANYL CITRATE 50 MCG: 50 INJECTION INTRAMUSCULAR; INTRAVENOUS at 01:52

## 2017-04-07 RX ADMIN — Medication 1 APPLICATION: at 14:51

## 2017-04-07 RX ADMIN — CALCIUM ACETATE MONOHYDRATE AND ALUMINUM SULFATE TETRADECAHYDRATE 1 PACKET: 952; 1347 POWDER, FOR SOLUTION TOPICAL at 23:23

## 2017-04-07 RX ADMIN — AMLODIPINE BESYLATE 10 MG: 10 TABLET ORAL at 19:20

## 2017-04-07 RX ADMIN — CHLORHEXIDINE GLUCONATE 15 ML: 1.2 RINSE ORAL at 20:59

## 2017-04-07 RX ADMIN — NICARDIPINE HYDROCHLORIDE 10 MG/HR: 25 INJECTION INTRAVENOUS at 22:33

## 2017-04-07 NOTE — PLAN OF CARE
Problem: Patient Care Overview (Adult)  Goal: Plan of Care Review  Outcome: Ongoing (interventions implemented as appropriate)    04/07/17 3944   Coping/Psychosocial Response Interventions   Plan Of Care Reviewed With patient   Outcome Evaluation   Outcome Summary/Follow up Plan Pt with much improved insight and participation this date. Pt able to sit EOB w/ CGA of 2 and stand w/ mod(A) of 2. No c/o of dizziness or pn throughout session. No new concerns.

## 2017-04-07 NOTE — PLAN OF CARE
Problem: Skin Integrity Impairment, Risk/Actual (Adult)  Goal: Identify Related Risk Factors and Signs and Symptoms  Outcome: Outcome(s) achieved Date Met:  04/07/17

## 2017-04-07 NOTE — PROGRESS NOTES
Dr. ADA Xie    [facility] CORONARY CARE    4/7/2017    Patient ID:  Name:  Lupe Becker  MRN:  [id number]  1950  66 y.o.  female            CC/Reason for visit: Follow-up for acute on chronic respiratory failure, pneumonia.    HPI: The patient is more awake, alert.  She does not want to use BiPAP during the day.  She required it again last night, because she became drowsy.  Her PCO2 has increased again, to a level where I think she usually lives, mainly in the 60 or 70 mmHg range.    Vitals:  Vitals:    04/07/17 0802 04/07/17 0833 04/07/17 0902 04/07/17 1146   BP: 179/79 151/65 149/65    BP Location:       Patient Position:       Pulse: 87 80 85    Resp:       Temp:    98.1 °F (36.7 °C)   TempSrc:    Oral   SpO2: 90% 92% 93%    Weight:       Height:         FiO2 (%): 45 %     Body mass index is 35.84 kg/(m^2).    Intake/Output Summary (Last 24 hours) at 04/07/17 1227  Last data filed at 04/07/17 1100   Gross per 24 hour   Intake           353.21 ml   Output             2521 ml   Net         -2167.79 ml       Exam:  GEN:  No distress, awake, alert  LUNGS: Diminished breath sounds bilaterally.  A few scattered rhonchi, but no actual wheezes., nonlabored breathing  CV:  Normal S1S2, without murmur, no edema  ABD:  Overweight, nontender  EXT:  No cyanosis or clubbing    Scheduled meds:    albuterol 2.5 mg Nebulization Q6H - RT   aluminum sulfate-calcium acetate 1 packet Topical Q8H   amLODIPine 10 mg Oral Q24H   chlorhexidine 15 mL Mouth/Throat Q12H   enoxaparin 40 mg Subcutaneous Nightly   HYDROmorphone 1 mg Intravenous Once   metoprolol tartrate 50 mg Nasogastric Q12H     IV meds:                        sodium chloride 9 mL/hr Last Rate: 9 mL/hr (04/07/17 0601)       Data Review:   I reviewed the patient's medications and new clinical results.  Lab Results   Component Value Date    CALCIUM 9.3 04/07/2017    PHOS 3.4 04/06/2017     Results from last 7 days  Lab Units 04/07/17  8781  04/06/17  0609 04/05/17  0420  04/01/17  0425   AST (SGOT) U/L 32 49* 66*  < > 247*   MAGNESIUM mg/dL  --  2.1  --   --  1.7   SODIUM mmol/L 146* 143 145  < > 135*   POTASSIUM mmol/L 3.8 3.7 3.9  < > 3.7   CHLORIDE mmol/L 96* 98 106  < > 98   TOTAL CO2 mmol/L 39.1* 34.4* 29.3*  < > 21.7*   BUN mg/dL 23 17 17  < > 28*   CREATININE mg/dL 0.52* 0.53* 0.56*  < > 1.22*   GLUCOSE mg/dL 121* 109* 115*  < > 128*   CALCIUM mg/dL 9.3 8.4* 8.3*  < > 7.9*   WBC 10*3/mm3 21.04* 15.05* 12.72*  < >  --    HEMOGLOBIN g/dL 10.4* 9.5* 9.3*  < >  --    PLATELETS 10*3/mm3 361 284 252  < >  --    ALT (SGPT) U/L 63* 75* 82*  < > 192*   < > = values in this interval not displayed.    Results from last 7 days  Lab Units 04/01/17  0425 03/31/17  1914 03/31/17  1236   TROPONIN T ng/mL 0.019 0.059* 0.024       Results from last 7 days  Lab Units 04/07/17  0752   PH, ARTERIAL pH units 7.364   PO2 ART mm Hg 58.7*   PCO2, ARTERIAL mm Hg 72.2*   HCO3 ART mmol/L 41.2*         ASSESSMENT:   Acute on chronic hypoxic and hypercapnic respiratory failure  Community acquired pneumonia  Left adrenal mass  Elevated liver enzymes  Sepsis  Paroxysmal SVT (supraventricular tachycardia)  Scoliosis    PLAN:  Her chronic respiratory failure seems to be at baseline.  We will use BiPAP only at night.  During the day, she needs to start mobilizing with physical therapy.  She needs good pulmonary hygiene measures.  We will consider transferring out of the ICU today.  I will give her one more dose of diuretics.  Repeat chest x-ray tomorrow.    Gerard Xie MD  4/7/2017

## 2017-04-07 NOTE — PROGRESS NOTES
Acute Care - Speech Language Pathology   Swallow Initial Evaluation [facility]     Patient Name: Lupe Becker  : 1950  MRN: [id number]  Today's Date: 2017  Onset of Illness/Injury or Date of Surgery Date: 17            Admit Date: 3/31/2017    SPEECH-LANGUAGE PATHOLOGY EVALUATION - SWALLOW  Subjective: The patient was seen on this date for a Clinical Swallow evaluation.  Patient was alert and cooperative.    The patient's history is significant for respiratory failure, PNA with sepsis, intubated 3/31 and extubated  10:10am. Per RN patient was on BiPAP last night, and MD okay with patient's 02 ranging from 86% to mid 90s. Per RN, Patient also has ileus and NG tube.  Patient's 02 at 86% during bedside.   Objective: Textures given included ice chips   Assessment: Difficulties were noted with none of the above consistencies characterized by no overt s/s of pen/asp with ice chips, however due to patient's overall generalized weakness, compromised respiratory status and NG tube, patient is not ready or safe for PO at this time..  SLP Findings:  Patient presents with high risk for aspiration and   oropharyngeal dysphagia, without esophageal component.   Recommendations: Diet Textures: NPO,  Medications should be taken crushed by alternate means. May have ice between after oral care, under staff or family supervision and with the recommended strategies for safe swallowing.   Other Recommended Evaluations: Re-evaluation at bedside  Week of 4/10 RN reports NG may be pulled beginning of next week  Dysphagia therapy is recommended. Rationale: improve safety of PO  Visit Dx:     ICD-10-CM ICD-9-CM   1. Sepsis, due to unspecified organism A41.9 038.9     995.91   2. Acute respiratory failure with hypoxia and hypercapnia J96.01 518.81    J96.02    3. Pneumonia of right lower lobe due to infectious organism J18.9 483.8   4. Leukocytosis, unspecified type D72.829 288.60   5. Generalized weakness R53.1  780.79     Patient Active Problem List   Diagnosis   • Accelerated essential hypertension   • Hypoxia   • Cervical fusion syndrome   • Abnormal presence of protein in urine   • Fast heart beat   • Ventricular pre-excitation with arrhythmia   • Adult onset hypothyroidism   • Anxiety about health   • Sepsis   • Paroxysmal SVT (supraventricular tachycardia)     Past Medical History:   Diagnosis Date   • Bicornate uterus 10/10/2016   • Breast lump 10/10/2016    Description: right   • Hypertension    • Mitral valve regurgitation      Past Surgical History:   Procedure Laterality Date   • APPENDECTOMY     • RHINOPLASTY     • TONSILLECTOMY            SWALLOW EVALUATION (last 72 hours)      Swallow Evaluation       04/07/17 1326                Rehab Evaluation    Document Type evaluation  -KA        Subjective Information no complaints  -KA        Patient Effort, Rehab Treatment adequate  -KA        Symptoms Noted During/After Treatment none  -KA        General Information    Patient Profile Review yes  -KA        Current Diet Limitations NPO  -KA        Prior Level of Function- Swallowing no diet consistency restrictions;safe, efficient swallowing in all situations  -KA        Plans/Goals Discussed With patient  -KA        Barriers to Rehab medically complex  -KA        Clinical Impression    Patient's Goals For Discharge return to PO diet  -KA        SLP Swallowing Diagnosis pharyngeal dysfunction;other (see comments)   high risk due to compromised respiratory status  -KA        Rehab Potential/Prognosis, Swallowing fair, will monitor progress closely  -KA        Criteria for Skilled Therapeutic Interventions Met skilled criteria for dysphagia intervention met  -KA        Therapy Frequency PRN  -KA        Predicted Duration Therapy Interv (days) until discharge  -KA        Expected Duration Therapy Session (min) 15-30 minutes  -KA        SLP Diet Recommendation NPO: unsafe for food/liquid intake  -KA        Recommended  Diagnostics reassess via clinical swallow (non-instrumental exam)  -        SLP Rec. for Method of Medication Administration meds via alternate route  -        Anticipated Discharge Disposition other (see comments)   unknown   -KA        Oral Motor Structure and Function    Oral Motor Anatomy and Physiology patient demonstrates anatomy and physiology that is WNL  -KA        Dentition Assessment present and adequate  -KA        Secretion Management WNL/WFL  -KA        Volitional Cough weak volitional cough  -KA        Oral Motor Assessment Comment generalized weakness  -          User Key  (r) = Recorded By, (t) = Taken By, (c) = Cosigned By    Initials Name Effective Dates    MAURICIO Bennett MA,CCC-SLP 04/13/15 -         EDUCATION  The patient has been educated in the following areas:   Dysphagia (Swallowing Impairment).    SLP Recommendation and Plan  SLP Swallowing Diagnosis: pharyngeal dysfunction, other (see comments) (high risk due to compromised respiratory status)  SLP Diet Recommendation: NPO: unsafe for food/liquid intake     SLP Rec. for Method of Medication Administration: meds via alternate route     Recommended Diagnostics: reassess via clinical swallow (non-instrumental exam)  Criteria for Skilled Therapeutic Interventions Met: skilled criteria for dysphagia intervention met  Anticipated Discharge Disposition: other (see comments) (unknown )  Rehab Potential/Prognosis, Swallowing: fair, will monitor progress closely  Therapy Frequency: PRN                        IP SLP Goals       04/07/17 1336          Begin to Take Some PO Safely    Begin to Take Some PO Safely- SLP, Date Established 04/07/17  -MAURICIO      Begin to Take Some PO Safely- SLP, Time to Achieve by discharge  -        User Key  (r) = Recorded By, (t) = Taken By, (c) = Cosigned By    Initials Name Provider Type    MAURICIO Bennett MA,CCC-SLP Speech and Language Pathologist             SLP Outcome Measures (last 72 hours)      SLP  Outcome Measures       04/07/17 1300          SLP Outcome Measures    Outcome Measure Used? Adult NOMS  -      FCM Scores    FCM Chosen Swallowing  -      Swallowing FCM Score 1  -MAURICIO        User Key  (r) = Recorded By, (t) = Taken By, (c) = Cosigned By    Initials Name Effective Dates    MAURICIO Bennett MA,CCC-SLP 04/13/15 -            Time Calculation:         Time Calculation- SLP       04/07/17 1337          Time Calculation- SLP    SLP Received On 04/07/17  -MAURICIO        User Key  (r) = Recorded By, (t) = Taken By, (c) = Cosigned By    Initials Name Provider Type    MAURICIO Bennett MA,CCC-SLP Speech and Language Pathologist          Therapy Charges for Today     Code Description Service Date Service Provider Modifiers Qty    [id number] HC ST EVAL ORAL PHARYNG SWALLOW 2 4/7/2017 Iraj Bennett MA,CCC-SLP GN 1               Iraj Bennett MA,CCC-SLP  4/7/2017

## 2017-04-07 NOTE — PROGRESS NOTES
"CC: PSVT    Interval History:   On BIPAP, awake    Vital Signs  Temp:  [96.5 °F (35.8 °C)-99 °F (37.2 °C)] 96.9 °F (36.1 °C)  Heart Rate:  [] 84  Resp:  [18-22] 18  BP: (123-193)/() 157/71  FiO2 (%):  [45 %] 45 %    Intake/Output Summary (Last 24 hours) at 04/07/17 0551  Last data filed at 04/07/17 0503   Gross per 24 hour   Intake          1603.21 ml   Output             1505 ml   Net            98.21 ml     Flowsheet Rows         First Filed Value    Admission Height  59\" (149.9 cm) Documented at 03/31/2017 1210    Admission Weight  165 lb (74.8 kg) Documented at 03/31/2017 1210          PHYSICAL EXAM:  General: No acute distress  Resp:NL Rate, unlabored, Diminished sounds, Diffuse scattered rhonchi  CV:NL rate and rhythm, NL PMI, Nl S1 and S2, no Mumur, no gallop, no rub, No JVD. Normal pedal pulses  ABD:Nl sounds, no masses or tenderness, nondistended, no quarding or rebound  Neuro: alert,cooperative and off ventilator, on BIPAP  Extr: No edema or cyanosis, moves all extremities      Results Review:      Results from last 7 days  Lab Units 04/06/17  0609   SODIUM mmol/L 143   POTASSIUM mmol/L 3.7   CHLORIDE mmol/L 98   TOTAL CO2 mmol/L 34.4*   BUN mg/dL 17   CREATININE mg/dL 0.53*   GLUCOSE mg/dL 109*   CALCIUM mg/dL 8.4*       Results from last 7 days  Lab Units 04/01/17  0425 03/31/17  1914 03/31/17  1236   TROPONIN T ng/mL 0.019 0.059* 0.024       Results from last 7 days  Lab Units 04/06/17  0609   WBC 10*3/mm3 15.05*   HEMOGLOBIN g/dL 9.5*   HEMATOCRIT % 32.3*   PLATELETS 10*3/mm3 284               Results from last 7 days  Lab Units 04/06/17  0609   MAGNESIUM mg/dL 2.1         @LABRCNT(bnp)@  I reviewed the patient's new clinical results.  I personally viewed and interpreted the patient's EKG/Telemetry data        Medication Review:   Meds reviewed      sodium chloride 9 mL/hr Last Rate: 9 mL/hr (04/06/17 0511)       Assessment/Plan  1. Respiratory failure what appears to be pneumonia with " sepsis. Off  ventilator but increased CO2 resp failure and now on BiPAP per pulmonary  2. 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  3. Hypothyroidism.  4. Mild aortic insufficiency mild mitral insufficiency.  5. Equivocal troponin. No acute event.  6.  SVT. No recurrence. Will switch to NG metoprolol.    We will see PRN over weekend        Trace Saleh MD  04/07/17  5:51 AM

## 2017-04-07 NOTE — PROGRESS NOTES
Acute Care - Physical Therapy Treatment Note  [facility]     Patient Name: Lupe Becker  : 1950  MRN: [id number]  Today's Date: 2017  Onset of Illness/Injury or Date of Surgery Date: 17  Date of Referral to PT: 17  Referring Physician: Miracle    Admit Date: 3/31/2017    Visit Dx:    ICD-10-CM ICD-9-CM   1. Sepsis, due to unspecified organism A41.9 038.9     995.91   2. Acute respiratory failure with hypoxia and hypercapnia J96.01 518.81    J96.02    3. Pneumonia of right lower lobe due to infectious organism J18.9 483.8   4. Leukocytosis, unspecified type D72.829 288.60   5. Generalized weakness R53.1 780.79     Patient Active Problem List   Diagnosis   • Accelerated essential hypertension   • Hypoxia   • Cervical fusion syndrome   • Abnormal presence of protein in urine   • Fast heart beat   • Ventricular pre-excitation with arrhythmia   • Adult onset hypothyroidism   • Anxiety about health   • Sepsis   • Paroxysmal SVT (supraventricular tachycardia)               Adult Rehabilitation Note       17 1612          Rehab Assessment/Intervention    Discipline physical therapy assistant  -MODE      Document Type therapy note (daily note)  -MODE      Subjective Information agree to therapy  -MODE      Patient Effort, Rehab Treatment good  -MODE      Precautions/Limitations fall precautions;oxygen therapy device and L/min  -MODE      Recorded by [MODE] Miguelito Neri PTA      Pain Assessment    Pain Assessment No/denies pain  -MODE      Recorded by [MODE] Miguelito Neri PTA      Cognitive Assessment/Intervention    Current Cognitive/Communication Assessment functional  -MODE      Orientation Status oriented to;person;place;situation  -MODE      Follows Commands/Answers Questions 100% of the time;able to follow single-step instructions;needs increased time  -MODE      Personal Safety mild impairment  -MODE      Personal Safety Interventions fall prevention program maintained;gait belt;nonskid shoes/slippers when out  of bed  -MODE      Recorded by [MODE] Miguelito Neri PTA      Bed Mobility, Assessment/Treatment    Bed Mobility, Assistive Device bed rails;head of bed elevated  -MODE      Bed Mob, Supine to Sit, [place] moderate assist (50% patient effort);2 person assist required;verbal cues required;nonverbal cues required (demo/gesture)  -MODE      Bed Mob, Sit to Supine, [place] moderate assist (50% patient effort);2 person assist required;verbal cues required;nonverbal cues required (demo/gesture)  -MODE      Bed Mobility, Safety Issues decreased use of arms for pushing/pulling;decreased use of legs for bridging/pushing  -MODE      Bed Mobility, Impairments strength decreased  -MODE      Recorded by [MODE] Miguelito Neri PTA      Transfer Assessment/Treatment    Transfers, Sit-Stand [place] moderate assist (50% patient effort);2 person assist required;verbal cues required;nonverbal cues required (demo/gesture);hand held assist  -MODE      Transfers, Stand-Sit [place] moderate assist (50% patient effort);2 person assist required;verbal cues required;nonverbal cues required (demo/gesture);hand held assist  -MODE      Transfer, Impairments strength decreased;impaired balance  -MODE      Recorded by [MODE] Miguelito Neri PTA      Gait Assessment/Treatment    Gait, [place] Level moderate assist (50% patient effort);verbal cues required;nonverbal cues required (demo/gesture);hand held assist;2 person assist required  -MODE      Gait, Distance (Feet) --   Several lateral steps to HOB.   -MODE      Recorded by [MODE] Miguelito Neri PTA      Motor Skills/Interventions    Additional Documentation Balance Skills Training (Group)  -MODE      Recorded by [MODE] Miguelito Neri PTA      Balance Skills Training    Sitting-Level of Assistance Contact guard  -MODE      Sitting-Balance Support Right upper extremity supported;Left upper extremity supported  -MODE      Sitting-Balance Activities Trunk control activities;Head control activities (Comment)  -MODE       Sitting # of Minutes 4  -MODE      Standing-Level of Assistance Moderate assistance;x2  -MODE      Static Standing Balance Support Right upper extremity supported;Left upper extremity supported  -MODE      Standing-Balance Activities Weight Shift A-P;Weight Shift R-L   static  -MODE      Standing Balance # of Minutes 2  -MODE      Recorded by [MODE] Miguelito Neri PTA      Positioning and Restraints    Pre-Treatment Position in bed  -MODE      Post Treatment Position bed  -MODE      In Bed supine;call light within reach;encouraged to call for assist;exit alarm on;notified nsg  -MODE      Recorded by [MODE] Miguelito Neri PTA        User Key  (r) = Recorded By, (t) = Taken By, (c) = Cosigned By    Initials Name Effective Dates    MODE Neri PTA 04/24/15 -                 IP PT Goals       04/04/17 1339          Bed Mobility PT LTG    Bed Mobility PT LTG, Date Established 04/04/17  -DM (r) CH (t) DM (c)      Bed Mobility PT LTG, Time to Achieve 1 wk  -DM (r) CH (t) DM (c)      Bed Mobility PT LTG, Activity Type all bed mobility  -DM (r) CH (t) DM (c)      Bed Mobility PT LTG, [place] Level moderate assist (50% patient effort);2 person assist required  -DM (r) CH (t) DM (c)      Static Sitting Balance PT LTG    Static Sitting Balance PT LTG, Date Established 04/04/17  -DM (r) CH (t) DM (c)      Static Sitting Balance PT LTG, Time to Achieve 1 wk  -DM (r) CH (t) DM (c)      Static Sitting Balance PT LTG, [place] Level supervision required  -DM (r) CH (t) DM (c)        User Key  (r) = Recorded By, (t) = Taken By, (c) = Cosigned By    Initials Name Provider Type    DELPHINE Montero, PT Physical Therapist    KRISTIAN Torres, PT Student PT Student          Physical Therapy Education     Title: PT OT SLP Therapies (Active)     Topic: Physical Therapy (Active)     Point: Mobility training (Done)    Learning Progress Summary    Learner Readiness Method Response Comment Documented by Status   Patient Acceptance MOLINA FISHER 04/07/17  1636 Done    Acceptance E VU  MB 04/06/17 1745 Done    Acceptance E NR   04/04/17 1338 Active   Family Acceptance E [facility] 04/06/17 1745 Done               Point: Body mechanics (Done)    Learning Progress Summary    Learner Readiness Method Response Comment Documented by Status   Patient Acceptance E VU,NR   04/07/17 1636 Done               Point: Precautions (Done)    Learning Progress Summary    Learner Readiness Method Response Comment Documented by Status   Patient Acceptance E VU,NR   04/07/17 1636 Done                      User Key     Initials Effective Dates Name Provider Type Discipline     04/24/15 -  Miguelito Neri PTA Physical Therapy Assistant PT     01/31/17 -  Celso Torres, PT Student PT Student PT    MB 03/06/17 -  Alexia Carias RN Registered Nurse Nurse                    PT Recommendation and Plan  Anticipated Discharge Disposition: inpatient rehabilitation facility  Planned Therapy Interventions: balance training, bed mobility training, strengthening, transfer training  PT Frequency: daily  Plan of Care Review  Plan Of Care Reviewed With: patient  Outcome Summary/Follow up Plan: Pt with much improved insight and participation this date.  Pt able to sit EOB w/ CGA of 2 and stand w/ mod(A) of 2.  No c/o of dizziness or pn throughout session.  No new concerns.           Outcome Measures       04/07/17 1600          How much help from another person do you currently need...    Turning from your back to your side while in flat bed without using bedrails? 2  -MODE      Moving from lying on back to sitting on the side of a flat bed without bedrails? 2  -MODE      Moving to and from a bed to a chair (including a wheelchair)? 2  -MODE      Standing up from a chair using your arms (e.g., wheelchair, bedside chair)? 2  -MODE      Climbing 3-5 steps with a railing? 1  -MODE      To walk in hospital room? 2  -MODE      AM-PAC 6 Clicks Score 11  -MODE      Functional Assessment    Outcome Measure Options AM-PAC  6 Clicks Basic Mobility (PT)  -MODE        User Key  (r) = Recorded By, (t) = Taken By, (c) = Cosigned By    Initials Name Provider Type    MODE Neri PTA Physical Therapy Assistant           Time Calculation:         PT Charges       04/07/17 1636          Time Calculation    Start Time 1612  -MODE      Stop Time 1626  -MODE      Time Calculation (min) 14 min  -MODE      PT Received On 04/07/17  -MODE      PT - Next Appointment 04/08/17  -MODE        User Key  (r) = Recorded By, (t) = Taken By, (c) = Cosigned By    Initials Name Provider Type    MODE Neri PTA Physical Therapy Assistant          Therapy Charges for Today     Code Description Service Date Service Provider Modifiers Qty    79606553369 HC PT THER PROC EA 15 MIN 4/7/2017 Miguelito Neri PTA GP 1    94957713614 HC PT THER SUPP EA 15 MIN 4/7/2017 Miguelito Neri PTA GP 1          PT G-Codes  Outcome Measure Options: AM-PAC 6 Clicks Basic Mobility (PT)    Miguelito Neri PTA  4/7/2017

## 2017-04-07 NOTE — PLAN OF CARE
Problem: Patient Care Overview (Adult)  Goal: Plan of Care Review  Outcome: Ongoing (interventions implemented as appropriate)    04/07/17 1944   Coping/Psychosocial Response Interventions   Plan Of Care Reviewed With patient;daughter   Patient Care Overview   Progress progress towards functional goals is fair   Outcome Evaluation   Outcome Summary/Follow up Plan pt is needing reinforcement on reasons as to why she is unable to eat/drink, remove NG tube, and minimal activity. She seems to decompensate quickly with activity, including bath and turns. Her o2 sats gradually decreased and became more labored throughout day on High Flow canula and Bipap was resumed. HTN has continued to be an issue, cardizem gtt was started.        Goal: Adult Individualization and Mutuality  Outcome: Ongoing (interventions implemented as appropriate)  Goal: Discharge Needs Assessment  Outcome: Ongoing (interventions implemented as appropriate)    Problem: Respiratory Insufficiency (Adult)  Goal: Identify Related Risk Factors and Signs and Symptoms  Outcome: Ongoing (interventions implemented as appropriate)  Goal: Acid/Base Balance  Outcome: Ongoing (interventions implemented as appropriate)  Goal: Effective Ventilation  Outcome: Ongoing (interventions implemented as appropriate)    Problem: Fall Risk (Adult)  Goal: Absence of Falls  Outcome: Ongoing (interventions implemented as appropriate)    Problem: Skin Integrity Impairment, Risk/Actual (Adult)  Goal: Skin Integrity/Wound Healing  Outcome: Ongoing (interventions implemented as appropriate)    Problem: NPPV/CPAP (Adult)  Goal: Signs and Symptoms of Listed Potential Problems Will be Absent or Manageable (NPPV/CPAP)  Outcome: Ongoing (interventions implemented as appropriate)

## 2017-04-07 NOTE — PLAN OF CARE
Problem: Inpatient SLP  Goal: Dysphagia- Patient will improve swallowing skills to begin to take some PO safely    04/07/17 1336   Begin to Take Some PO Safely   Begin to Take Some PO Safely- SLP, Date Established 04/07/17   Begin to Take Some PO Safely- SLP, Time to Achieve by discharge

## 2017-04-07 NOTE — PLAN OF CARE
Problem: Patient Care Overview (Adult)  Goal: Plan of Care Review    04/07/17 0531   Patient Care Overview   Progress progress towards functional goals is fair   Outcome Evaluation   Outcome Summary/Follow up Plan HAS REMAINED ON BIPAP ALL NIGHT. O-2 SATS UPPER 80'S TO MID-90'S THIS SHIFT. HAS VERY THICK TENACIOUS SPUTUM. GIVEN PRN HYDRALAZINE X1 FOR SBP GREATER THAN 160. NG DRAINAGE EXTREMELY THICK. GIVEN FENTANYL X3 FOR C/O OF ABD. PAIN, HR 60'S TO 80'S IN SINUS RHYTHM.

## 2017-04-08 LAB
ALBUMIN SERPL-MCNC: 3.1 G/DL (ref 3.5–5.2)
ALBUMIN/GLOB SERPL: 0.9 G/DL
ALP SERPL-CCNC: 95 U/L (ref 39–117)
ALT SERPL W P-5'-P-CCNC: 51 U/L (ref 1–33)
ANION GAP SERPL CALCULATED.3IONS-SCNC: 11 MMOL/L
AST SERPL-CCNC: 24 U/L (ref 1–32)
BASOPHILS # BLD AUTO: 0.03 10*3/MM3 (ref 0–0.2)
BASOPHILS NFR BLD AUTO: 0.2 % (ref 0–1.5)
BILIRUB SERPL-MCNC: 0.3 MG/DL (ref 0.1–1.2)
BUN BLD-MCNC: 26 MG/DL (ref 8–23)
BUN/CREAT SERPL: 52 (ref 7–25)
CALCIUM SPEC-SCNC: 9.1 MG/DL (ref 8.6–10.5)
CHLORIDE SERPL-SCNC: 99 MMOL/L (ref 98–107)
CO2 SERPL-SCNC: 40 MMOL/L (ref 22–29)
CREAT BLD-MCNC: 0.5 MG/DL (ref 0.57–1)
DEPRECATED RDW RBC AUTO: 55.8 FL (ref 37–54)
EOSINOPHIL # BLD AUTO: 0.01 10*3/MM3 (ref 0–0.7)
EOSINOPHIL NFR BLD AUTO: 0.1 % (ref 0.3–6.2)
ERYTHROCYTE [DISTWIDTH] IN BLOOD BY AUTOMATED COUNT: 16.3 % (ref 11.7–13)
GFR SERPL CREATININE-BSD FRML MDRD: 124 ML/MIN/1.73
GLOBULIN UR ELPH-MCNC: 3.6 GM/DL
GLUCOSE BLD-MCNC: 104 MG/DL (ref 65–99)
GLUCOSE BLDC GLUCOMTR-MCNC: 103 MG/DL (ref 70–130)
GLUCOSE BLDC GLUCOMTR-MCNC: 110 MG/DL (ref 70–130)
GLUCOSE BLDC GLUCOMTR-MCNC: 121 MG/DL (ref 70–130)
HCT VFR BLD AUTO: 36.8 % (ref 35.6–45.5)
HGB BLD-MCNC: 10.3 G/DL (ref 11.9–15.5)
IMM GRANULOCYTES # BLD: 0.89 10*3/MM3 (ref 0–0.03)
IMM GRANULOCYTES NFR BLD: 4.8 % (ref 0–0.5)
LYMPHOCYTES # BLD AUTO: 1.15 10*3/MM3 (ref 0.9–4.8)
LYMPHOCYTES NFR BLD AUTO: 6.2 % (ref 19.6–45.3)
MCH RBC QN AUTO: 26.3 PG (ref 26.9–32)
MCHC RBC AUTO-ENTMCNC: 28 G/DL (ref 32.4–36.3)
MCV RBC AUTO: 94.1 FL (ref 80.5–98.2)
MONOCYTES # BLD AUTO: 1.07 10*3/MM3 (ref 0.2–1.2)
MONOCYTES NFR BLD AUTO: 5.7 % (ref 5–12)
NEUTROPHILS # BLD AUTO: 15.48 10*3/MM3 (ref 1.9–8.1)
NEUTROPHILS NFR BLD AUTO: 83 % (ref 42.7–76)
PLATELET # BLD AUTO: 376 10*3/MM3 (ref 140–500)
PMV BLD AUTO: 10.4 FL (ref 6–12)
POTASSIUM BLD-SCNC: 3.4 MMOL/L (ref 3.5–5.2)
PROT SERPL-MCNC: 6.7 G/DL (ref 6–8.5)
RBC # BLD AUTO: 3.91 10*6/MM3 (ref 3.9–5.2)
SODIUM BLD-SCNC: 150 MMOL/L (ref 136–145)
WBC NRBC COR # BLD: 18.63 10*3/MM3 (ref 4.5–10.7)

## 2017-04-08 PROCEDURE — 25010000003 POTASSIUM CHLORIDE PER 2 MEQ: Performed by: INTERNAL MEDICINE

## 2017-04-08 PROCEDURE — 25010000002 ONDANSETRON PER 1 MG: Performed by: INTERNAL MEDICINE

## 2017-04-08 PROCEDURE — 94799 UNLISTED PULMONARY SVC/PX: CPT

## 2017-04-08 PROCEDURE — 82962 GLUCOSE BLOOD TEST: CPT

## 2017-04-08 PROCEDURE — 25010000002 MORPHINE PER 10 MG: Performed by: INTERNAL MEDICINE

## 2017-04-08 PROCEDURE — 25010000002 ENOXAPARIN PER 10 MG: Performed by: INTERNAL MEDICINE

## 2017-04-08 PROCEDURE — 85025 COMPLETE CBC W/AUTO DIFF WBC: CPT | Performed by: INTERNAL MEDICINE

## 2017-04-08 PROCEDURE — 80053 COMPREHEN METABOLIC PANEL: CPT | Performed by: INTERNAL MEDICINE

## 2017-04-08 PROCEDURE — 25010000002 FENTANYL CITRATE (PF) 100 MCG/2ML SOLUTION: Performed by: INTERNAL MEDICINE

## 2017-04-08 RX ORDER — LORAZEPAM 2 MG/ML
1 INJECTION INTRAMUSCULAR
Status: DISCONTINUED | OUTPATIENT
Start: 2017-04-08 | End: 2017-04-09

## 2017-04-08 RX ORDER — POTASSIUM CHLORIDE 1.5 G/1.77G
40 POWDER, FOR SOLUTION ORAL AS NEEDED
Status: DISCONTINUED | OUTPATIENT
Start: 2017-04-08 | End: 2017-04-08

## 2017-04-08 RX ORDER — POTASSIUM CHLORIDE 29.8 MG/ML
20 INJECTION INTRAVENOUS
Status: DISCONTINUED | OUTPATIENT
Start: 2017-04-08 | End: 2017-04-14

## 2017-04-08 RX ORDER — POTASSIUM CHLORIDE 750 MG/1
40 CAPSULE, EXTENDED RELEASE ORAL AS NEEDED
Status: DISCONTINUED | OUTPATIENT
Start: 2017-04-08 | End: 2017-04-08

## 2017-04-08 RX ORDER — LABETALOL HYDROCHLORIDE 5 MG/ML
20 INJECTION, SOLUTION INTRAVENOUS EVERY 8 HOURS PRN
Status: DISCONTINUED | OUTPATIENT
Start: 2017-04-08 | End: 2017-04-10

## 2017-04-08 RX ORDER — FENTANYL CITRATE 50 UG/ML
200 INJECTION, SOLUTION INTRAMUSCULAR; INTRAVENOUS ONCE
Status: DISCONTINUED | OUTPATIENT
Start: 2017-04-08 | End: 2017-04-08

## 2017-04-08 RX ORDER — PROPOFOL 10 MG/ML
VIAL (ML) INTRAVENOUS
Status: DISPENSED
Start: 2017-04-08 | End: 2017-04-08

## 2017-04-08 RX ORDER — HYDRALAZINE HYDROCHLORIDE 20 MG/ML
20 INJECTION INTRAMUSCULAR; INTRAVENOUS EVERY 6 HOURS PRN
Status: DISCONTINUED | OUTPATIENT
Start: 2017-04-08 | End: 2017-04-09

## 2017-04-08 RX ORDER — POTASSIUM CHLORIDE 29.8 MG/ML
20 INJECTION INTRAVENOUS
Status: DISCONTINUED | OUTPATIENT
Start: 2017-04-08 | End: 2017-04-08

## 2017-04-08 RX ORDER — ALBUTEROL SULFATE 2.5 MG/3ML
2.5 SOLUTION RESPIRATORY (INHALATION)
Status: DISCONTINUED | OUTPATIENT
Start: 2017-04-08 | End: 2017-04-09

## 2017-04-08 RX ADMIN — ENOXAPARIN SODIUM 40 MG: 40 INJECTION SUBCUTANEOUS at 21:02

## 2017-04-08 RX ADMIN — FENTANYL CITRATE 25 MCG: 50 INJECTION INTRAMUSCULAR; INTRAVENOUS at 09:23

## 2017-04-08 RX ADMIN — CALCIUM ACETATE MONOHYDRATE AND ALUMINUM SULFATE TETRADECAHYDRATE 1 PACKET: 952; 1347 POWDER, FOR SOLUTION TOPICAL at 06:30

## 2017-04-08 RX ADMIN — ONDANSETRON 4 MG: 2 INJECTION INTRAMUSCULAR; INTRAVENOUS at 03:16

## 2017-04-08 RX ADMIN — CHLORHEXIDINE GLUCONATE 15 ML: 1.2 RINSE ORAL at 09:10

## 2017-04-08 RX ADMIN — MORPHINE SULFATE 4 MG: 4 INJECTION, SOLUTION INTRAMUSCULAR; INTRAVENOUS at 16:57

## 2017-04-08 RX ADMIN — NICARDIPINE HYDROCHLORIDE 7.5 MG: 25 INJECTION INTRAVENOUS at 14:05

## 2017-04-08 RX ADMIN — NICARDIPINE HYDROCHLORIDE 7.5 MG/HR: 25 INJECTION INTRAVENOUS at 21:01

## 2017-04-08 RX ADMIN — NICARDIPINE HYDROCHLORIDE 7.5 MG/HR: 25 INJECTION INTRAVENOUS at 15:40

## 2017-04-08 RX ADMIN — ALBUTEROL SULFATE 2.5 MG: 2.5 SOLUTION RESPIRATORY (INHALATION) at 07:08

## 2017-04-08 RX ADMIN — METOPROLOL TARTRATE 50 MG: 50 TABLET ORAL at 09:11

## 2017-04-08 RX ADMIN — POTASSIUM CHLORIDE 20 MEQ: 400 INJECTION, SOLUTION INTRAVENOUS at 08:06

## 2017-04-08 RX ADMIN — NICARDIPINE HYDROCHLORIDE 10 MG/HR: 25 INJECTION INTRAVENOUS at 02:01

## 2017-04-08 RX ADMIN — NICARDIPINE HYDROCHLORIDE 7.5 MG/HR: 25 INJECTION INTRAVENOUS at 10:01

## 2017-04-08 RX ADMIN — ALBUTEROL SULFATE 2.5 MG: 2.5 SOLUTION RESPIRATORY (INHALATION) at 20:35

## 2017-04-08 RX ADMIN — NICARDIPINE HYDROCHLORIDE 7.5 MG/HR: 25 INJECTION INTRAVENOUS at 17:14

## 2017-04-08 RX ADMIN — Medication 1 APPLICATION: at 07:03

## 2017-04-08 RX ADMIN — NICARDIPINE HYDROCHLORIDE 10 MG/HR: 25 INJECTION INTRAVENOUS at 07:03

## 2017-04-08 NOTE — NURSING NOTE
Dressing  Change to rt ij now able toget blood return from distal and medial port proximal and medial 2 flushes but no blood return A lang assist

## 2017-04-08 NOTE — PLAN OF CARE
Problem: Patient Care Overview (Adult)  Goal: Plan of Care Review  Outcome: Ongoing (interventions implemented as appropriate)    04/08/17 6965   Outcome Evaluation   Outcome Summary/Follow up Plan pt. on bibpap unable to suzanne being out.       Goal: Adult Individualization and Mutuality  Outcome: Ongoing (interventions implemented as appropriate)  Goal: Discharge Needs Assessment  Outcome: Ongoing (interventions implemented as appropriate)    Problem: Respiratory Insufficiency (Adult)  Goal: Identify Related Risk Factors and Signs and Symptoms  Outcome: Ongoing (interventions implemented as appropriate)  Goal: Acid/Base Balance  Outcome: Ongoing (interventions implemented as appropriate)  Goal: Effective Ventilation  Outcome: Ongoing (interventions implemented as appropriate)    Problem: Fall Risk (Adult)  Goal: Absence of Falls  Outcome: Ongoing (interventions implemented as appropriate)    Problem: Skin Integrity Impairment, Risk/Actual (Adult)  Goal: Skin Integrity/Wound Healing  Outcome: Ongoing (interventions implemented as appropriate)    Problem: NPPV/CPAP (Adult)  Goal: Signs and Symptoms of Listed Potential Problems Will be Absent or Manageable (NPPV/CPAP)  Outcome: Ongoing (interventions implemented as appropriate)

## 2017-04-08 NOTE — NURSING NOTE
Pt. Refused to be intubated an wants palliative care dr. brown aware new order for palliative care place.

## 2017-04-08 NOTE — NURSING NOTE
Pt. On BiPAP on 100o2 sats 88 % Dr. Xie  At bedside ready to intubate. Pt, refused intubation daughter Kandace notified.

## 2017-04-08 NOTE — PLAN OF CARE
Problem: Patient Care Overview (Adult)  Goal: Plan of Care Review  Outcome: Ongoing (interventions implemented as appropriate)    04/08/17 0556   Coping/Psychosocial Response Interventions   Plan Of Care Reviewed With patient   Outcome Evaluation   Outcome Summary/Follow up Plan unable to tolerate mini breaks off bipap at 0420 head of bed lowered when on high flow for 2 minutes and desated to 47% and face was blue ABGs at 2335 7.35 77.1 61.2 42.9 14.0 and 88.2% bipap now on 100% 20/8rate 18 [facility] now only has 2 ports which will draw blood ng onlyhad 60 cc out however pt co nausea twice and zofran given twice continue soaks to excoriation of buttocks no bm this shift cardene drip remains at 10 mg/hr         Problem: Respiratory Insufficiency (Adult)  Goal: Identify Related Risk Factors and Signs and Symptoms  Outcome: Ongoing (interventions implemented as appropriate)    Problem: NPPV/CPAP (Adult)  Goal: Signs and Symptoms of Listed Potential Problems Will be Absent or Manageable (NPPV/CPAP)  Outcome: Ongoing (interventions implemented as appropriate)

## 2017-04-08 NOTE — NURSING NOTE
Dr. Anne-Marie bauer and notified pt, has change her mind if her oxygen gets low. Pt. Wants to be intubated.o2 sat 91 on BiPAP on 100 % suzanne well resp 22.

## 2017-04-08 NOTE — PLAN OF CARE
Problem: Inpatient Physical Therapy  Goal: Bed Mobility Goal LTG- PT  Outcome: Unable to achieve outcome(s) by discharge Date Met:  04/08/17 04/08/17 1138   Bed Mobility PT LTG   Bed Mobility PT LTG, Date Goal Reviewed 04/08/17   Bed Mobility PT LTG, Outcome goal no longer appropriate       Goal: Static Sitting Balance Goal LTG- PT  Outcome: Unable to achieve outcome(s) by discharge Date Met:  04/08/17 04/08/17 1138   Static Sitting Balance PT LTG   Static Sitting Balance PT LTG, Date Goal Reviewed 04/08/17   Static Sitting Balance PT LTG, Outcome goal no longer appropriate

## 2017-04-08 NOTE — THERAPY DISCHARGE NOTE
Acute Care - Physical Therapy Discharge Summary  [facility]       Patient Name: Lupe Becker  : 1950  MRN: [id number]    Today's Date: 2017  Onset of Illness/Injury or Date of Surgery Date: 17    Date of Referral to PT: 17  Referring Physician: Miracle      Admit Date: 3/31/2017      PT Recommendation and Plan    Visit Dx:    ICD-10-CM ICD-9-CM   1. Sepsis, due to unspecified organism A41.9 038.9     995.91   2. Acute respiratory failure with hypoxia and hypercapnia J96.01 518.81    J96.02    3. Pneumonia of right lower lobe due to infectious organism J18.9 483.8   4. Leukocytosis, unspecified type D72.829 288.60   5. Generalized weakness R53.1 780.79             Outcome Measures       17 1600          How much help from another person do you currently need...    Turning from your back to your side while in flat bed without using bedrails? 2  -MODE      Moving from lying on back to sitting on the side of a flat bed without bedrails? 2  -MODE      Moving to and from a bed to a chair (including a wheelchair)? 2  -MODE      Standing up from a chair using your arms (e.g., wheelchair, bedside chair)? 2  -MODE      Climbing 3-5 steps with a railing? 1  -MODE      To walk in hospital room? 2  -MODE      AM-PAC 6 Clicks Score 11  -MODE      Functional Assessment    Outcome Measure Options AM-PAC 6 Clicks Basic Mobility (PT)  -MODE        User Key  (r) = Recorded By, (t) = Taken By, (c) = Cosigned By    Initials Name Provider Type    MODE Neri PTA Physical Therapy Assistant                      IP PT Goals       17 1138 17 1339       Bed Mobility PT LTG    Bed Mobility PT LTG, Date Established  17  -DM (r) CH (t) DM (c)     Bed Mobility PT LTG, Time to Achieve  1 wk  -DM (r) CH (t) DM (c)     Bed Mobility PT LTG, Activity Type  all bed mobility  -DM (r) CH (t) DM (c)     Bed Mobility PT LTG, [place] Level  moderate assist (50% patient effort);2 person assist required  -DM (r) CH  (t) DM (c)     Bed Mobility PT LTG, Date Goal Reviewed 04/08/17  -      Bed Mobility PT LTG, Outcome goal no longer appropriate  -      Static Sitting Balance PT LTG    Static Sitting Balance PT LTG, Date Established  04/04/17  -DM (r) CH (t) DM (c)     Static Sitting Balance PT LTG, Time to Achieve  1 wk  -DM (r) CH (t) DM (c)     Static Sitting Balance PT LTG, [place] Level  supervision required  -DM (r) CH (t) DM (c)     Static Sitting Balance PT LTG, Date Goal Reviewed 04/08/17  -      Static Sitting Balance PT LTG, Outcome goal no longer appropriate  -        User Key  (r) = Recorded By, (t) = Taken By, (c) = Cosigned By    Initials Name Provider Type    DELPHINE Montero, PT Physical Therapist     Idalia Remy, PT Physical Therapist    KRISTIAN Torres, PT Student PT Student              PT Discharge Summary  Reason for Discharge: Change in medical status (now on bipap, comfort measures. OK'ed by Dr. Xie to d/c PT at this time. please reorder should status change. )      Idalia Remy, PT   4/8/2017

## 2017-04-08 NOTE — NURSING NOTE
Pt. Friend in the room  Requesting to intubated  Patient. Charge nurse notified and spoke with patients daughter and friend  On poor prognosis.

## 2017-04-08 NOTE — NURSING NOTE
"After application of fartun akbar pt indicates that \" i am going to try to get out of here today.\" reports I cant stay here any longer I can't take it.\" explained that her breathing status is so impaired would not survive without bipap vs vent . Pt reports she will talk to the doctors.   "

## 2017-04-08 NOTE — SIGNIFICANT NOTE
04/08/17 1137   PT Discharge Summary   Reason for Discharge Change in medical status  (now on bipap, comfort measures. OK'ed by Dr. Xie to d/c PT at this time. please reorder should status change. )

## 2017-04-08 NOTE — PROGRESS NOTES
Dr. ADA Xie    [facility] CORONARY CARE    4/8/2017    Patient ID:  Name:  Lupe Becker  MRN:  [id number]  1950  66 y.o.  female            CC/Reason for visit: Follow-up for acute on chronic respiratory failure    HPI: The patient's respiratory status is deteriorating.  She is extremely hypoxic.  She is on maximum support with noninvasive bilevel BiPAP, with 100% FiO2, and still barely having oxygen saturations of 88%.  She is in some respiratory distress, but she is mentally completely awake, alert and competent in her decision-making capacity.  She is adamantly refusing intubation.  She does not want to go on mechanical ventilation.    I had a conversation with her daughter at the bedside.    Vitals:  Vitals:    04/08/17 0847 04/08/17 0902 04/08/17 0918 04/08/17 0933   BP: 155/74 156/89 161/83 150/78   BP Location:       Patient Position:       Pulse: 92 107 103 87   Resp:       Temp:       TempSrc:       SpO2: 93% 92% 92% 92%   Weight:       Height:         FiO2 (%): 45 %     Body mass index is 35.84 kg/(m^2).    Intake/Output Summary (Last 24 hours) at 04/08/17 1031  Last data filed at 04/08/17 0748   Gross per 24 hour   Intake              939 ml   Output             1501 ml   Net             -562 ml       Exam:  LUNGS: Diminished breath sounds bilaterally. A few scattered rhonchi, but no actual wheezes., labored breathing  CV: Normal S1S2, without murmur, no edema  ABD: Overweight, nontender  EXT: No cyanosis or clubbing  Neurologic exam: She is awake, alert, completely decisional, and writing on board, communicating very effectively with BiPAP mask on her face.       Scheduled meds:    albuterol 2.5 mg Nebulization Q6H - RT   aluminum sulfate-calcium acetate 1 packet Topical Q8H   amLODIPine 10 mg Oral Q24H   chlorhexidine 15 mL Mouth/Throat Q12H   enoxaparin 40 mg Subcutaneous Nightly   FentaNYL Citrate (PF) 200 mcg Intravenous Once   metoprolol tartrate 50 mg Nasogastric Q12H      IV meds:                        niCARdipine 5-15 mg/hr Last Rate: 7.5 mg/hr (04/08/17 1001)   propofol 5-50 mcg/kg/min    sodium chloride 9 mL/hr Last Rate: 9 mL/hr (04/07/17 0601)       Data Review:   I reviewed the patient's medications and new clinical results.  Lab Results   Component Value Date    CALCIUM 9.1 04/08/2017    PHOS 3.4 04/06/2017     Results from last 7 days  Lab Units 04/08/17  0325 04/07/17  0438 04/06/17  0609   AST (SGOT) U/L 24 32 49*   MAGNESIUM mg/dL  --   --  2.1   SODIUM mmol/L 150* 146* 143   POTASSIUM mmol/L 3.4* 3.8 3.7   CHLORIDE mmol/L 99 96* 98   TOTAL CO2 mmol/L 40.0* 39.1* 34.4*   BUN mg/dL 26* 23 17   CREATININE mg/dL 0.50* 0.52* 0.53*   GLUCOSE mg/dL 104* 121* 109*   CALCIUM mg/dL 9.1 9.3 8.4*   WBC 10*3/mm3 18.63* 21.04* 15.05*   HEMOGLOBIN g/dL 10.3* 10.4* 9.5*   PLATELETS 10*3/mm3 376 361 284   ALT (SGPT) U/L 51* 63* 75*           Results from last 7 days  Lab Units 04/07/17  2340   PH, ARTERIAL pH units 7.354   PO2 ART mm Hg 61.2*   PCO2, ARTERIAL mm Hg 77.1*   HCO3 ART mmol/L 42.9*         ASSESSMENT:   Acute on chronic hypoxic and hypercapnic respiratory failure  Community acquired pneumonia  Left adrenal mass  Elevated liver enzymes  Sepsis  Paroxysmal SVT (supraventricular tachycardia)  Scoliosis    PLAN:  Her respiratory failure is rapidly deteriorating.  We had a long conversation with the patient, and subsequently with her daughter when she arrived at the bedside.  The patient is adamantly refusing intubation and mechanical ventilation.  I informed her that she is at extremely high risk of dying if she does not get intubated and supported with mechanical ventilation.  She has chosen for comfort measures and palliative care only.  She wants to die naturally and stop all artificial life-support.    Palliative decision making, critical care time 35 minutes.  This excludes any separately billable procedure time    Gerard Xie MD  4/8/2017

## 2017-04-08 NOTE — NURSING NOTE
Pt request a break attempt to transition from bipap to hiflow head lowered to change pad o2 sats drop to 47% face turns blue head raised very slow recovery return to bipap             5

## 2017-04-09 ENCOUNTER — APPOINTMENT (OUTPATIENT)
Dept: GENERAL RADIOLOGY | Facility: HOSPITAL | Age: 67
End: 2017-04-09

## 2017-04-09 LAB
ALBUMIN SERPL-MCNC: 3.1 G/DL (ref 3.5–5.2)
ALBUMIN/GLOB SERPL: 0.9 G/DL
ALP SERPL-CCNC: 80 U/L (ref 39–117)
ALT SERPL W P-5'-P-CCNC: 31 U/L (ref 1–33)
ANION GAP SERPL CALCULATED.3IONS-SCNC: 19.8 MMOL/L
ARTERIAL PATENCY WRIST A: POSITIVE
ARTERIAL PATENCY WRIST A: POSITIVE
AST SERPL-CCNC: 18 U/L (ref 1–32)
ATMOSPHERIC PRESS: 750.6 MMHG
ATMOSPHERIC PRESS: 751.6 MMHG
B PERT DNA SPEC QL NAA+PROBE: NOT DETECTED
BASE EXCESS BLDA CALC-SCNC: 9.1 MMOL/L (ref 0–2)
BASE EXCESS BLDA CALC-SCNC: 9.5 MMOL/L (ref 0–2)
BASOPHILS # BLD AUTO: 0.01 10*3/MM3 (ref 0–0.2)
BASOPHILS NFR BLD AUTO: 0.1 % (ref 0–1.5)
BDY SITE: ABNORMAL
BDY SITE: ABNORMAL
BILIRUB SERPL-MCNC: 0.4 MG/DL (ref 0.1–1.2)
BUN BLD-MCNC: 38 MG/DL (ref 8–23)
BUN/CREAT SERPL: 46.9 (ref 7–25)
C PNEUM DNA NPH QL NAA+NON-PROBE: NOT DETECTED
CALCIUM SPEC-SCNC: 9.2 MG/DL (ref 8.6–10.5)
CHLORIDE SERPL-SCNC: 102 MMOL/L (ref 98–107)
CO2 SERPL-SCNC: 31.2 MMOL/L (ref 22–29)
CREAT BLD-MCNC: 0.81 MG/DL (ref 0.57–1)
DEPRECATED RDW RBC AUTO: 54.4 FL (ref 37–54)
EOSINOPHIL # BLD AUTO: 0 10*3/MM3 (ref 0–0.7)
EOSINOPHIL NFR BLD AUTO: 0 % (ref 0.3–6.2)
ERYTHROCYTE [DISTWIDTH] IN BLOOD BY AUTOMATED COUNT: 15.7 % (ref 11.7–13)
FLUAV H1 2009 PAND RNA NPH QL NAA+PROBE: NOT DETECTED
FLUAV H1 HA GENE NPH QL NAA+PROBE: NOT DETECTED
FLUAV H3 RNA NPH QL NAA+PROBE: NOT DETECTED
FLUAV SUBTYP SPEC NAA+PROBE: NOT DETECTED
FLUBV RNA ISLT QL NAA+PROBE: NOT DETECTED
GFR SERPL CREATININE-BSD FRML MDRD: 71 ML/MIN/1.73
GLOBULIN UR ELPH-MCNC: 3.5 GM/DL
GLUCOSE BLD-MCNC: 137 MG/DL (ref 65–99)
GLUCOSE BLDC GLUCOMTR-MCNC: 114 MG/DL (ref 70–130)
GLUCOSE BLDC GLUCOMTR-MCNC: 121 MG/DL (ref 70–130)
GLUCOSE BLDC GLUCOMTR-MCNC: 123 MG/DL (ref 70–130)
HADV DNA SPEC NAA+PROBE: NOT DETECTED
HCO3 BLDA-SCNC: 38.9 MMOL/L (ref 22–28)
HCO3 BLDA-SCNC: 39.5 MMOL/L (ref 22–28)
HCOV 229E RNA SPEC QL NAA+PROBE: NOT DETECTED
HCOV HKU1 RNA SPEC QL NAA+PROBE: NOT DETECTED
HCOV NL63 RNA SPEC QL NAA+PROBE: NOT DETECTED
HCOV OC43 RNA SPEC QL NAA+PROBE: NOT DETECTED
HCT VFR BLD AUTO: 35.9 % (ref 35.6–45.5)
HGB BLD-MCNC: 10.3 G/DL (ref 11.9–15.5)
HMPV RNA NPH QL NAA+NON-PROBE: NOT DETECTED
HOROWITZ INDEX BLD+IHG-RTO: 100 %
HOROWITZ INDEX BLD+IHG-RTO: 100 %
HPIV1 RNA SPEC QL NAA+PROBE: NOT DETECTED
HPIV2 RNA SPEC QL NAA+PROBE: NOT DETECTED
HPIV3 RNA NPH QL NAA+PROBE: NOT DETECTED
HPIV4 P GENE NPH QL NAA+PROBE: NOT DETECTED
IMM GRANULOCYTES # BLD: 0.14 10*3/MM3 (ref 0–0.03)
IMM GRANULOCYTES NFR BLD: 0.8 % (ref 0–0.5)
LYMPHOCYTES # BLD AUTO: 0.84 10*3/MM3 (ref 0.9–4.8)
LYMPHOCYTES NFR BLD AUTO: 4.6 % (ref 19.6–45.3)
M PNEUMO IGG SER IA-ACNC: NOT DETECTED
MCH RBC QN AUTO: 26.9 PG (ref 26.9–32)
MCHC RBC AUTO-ENTMCNC: 28.7 G/DL (ref 32.4–36.3)
MCV RBC AUTO: 93.7 FL (ref 80.5–98.2)
MODALITY: ABNORMAL
MODALITY: ABNORMAL
MONOCYTES # BLD AUTO: 0.36 10*3/MM3 (ref 0.2–1.2)
MONOCYTES NFR BLD AUTO: 2 % (ref 5–12)
NEUTROPHILS # BLD AUTO: 16.77 10*3/MM3 (ref 1.9–8.1)
NEUTROPHILS NFR BLD AUTO: 92.5 % (ref 42.7–76)
O2 A-A PPRESDIFF RESPIRATORY: 0.1 MMHG
O2 A-A PPRESDIFF RESPIRATORY: 0.1 MMHG
PCO2 BLDA: 81.7 MM HG (ref 35–45)
PCO2 BLDA: 83.2 MM HG (ref 35–45)
PEEP RESPIRATORY: 7.5 CM[H2O]
PH BLDA: 7.28 PH UNITS (ref 7.35–7.45)
PH BLDA: 7.29 PH UNITS (ref 7.35–7.45)
PLATELET # BLD AUTO: 320 10*3/MM3 (ref 140–500)
PMV BLD AUTO: 10.3 FL (ref 6–12)
PO2 BLDA: 52.2 MM HG (ref 80–100)
PO2 BLDA: 53.5 MM HG (ref 80–100)
POTASSIUM BLD-SCNC: 3.7 MMOL/L (ref 3.5–5.2)
PROT SERPL-MCNC: 6.6 G/DL (ref 6–8.5)
RBC # BLD AUTO: 3.83 10*6/MM3 (ref 3.9–5.2)
RHINOVIRUS RNA SPEC NAA+PROBE: NOT DETECTED
RSV RNA NPH QL NAA+NON-PROBE: NOT DETECTED
SAO2 % BLDCOA: 79.4 % (ref 92–99)
SAO2 % BLDCOA: 81.1 % (ref 92–99)
SET MECH RESP RATE: 16
SET MECH RESP RATE: 18
SODIUM BLD-SCNC: 153 MMOL/L (ref 136–145)
TOTAL RATE: 16 BREATHS/MINUTE
TOTAL RATE: 22 BREATHS/MINUTE
VENTILATOR MODE: ABNORMAL
VT ON VENT VENT: 269 ML
VT ON VENT VENT: 353 ML
WBC NRBC COR # BLD: 18.12 10*3/MM3 (ref 4.5–10.7)

## 2017-04-09 PROCEDURE — 82962 GLUCOSE BLOOD TEST: CPT

## 2017-04-09 PROCEDURE — 93010 ELECTROCARDIOGRAM REPORT: CPT | Performed by: INTERNAL MEDICINE

## 2017-04-09 PROCEDURE — 94003 VENT MGMT INPAT SUBQ DAY: CPT

## 2017-04-09 PROCEDURE — 25010000002 MORPHINE PER 10 MG: Performed by: INTERNAL MEDICINE

## 2017-04-09 PROCEDURE — 25010000002 PHENYLEPHRINE PER 1 ML

## 2017-04-09 PROCEDURE — 87798 DETECT AGENT NOS DNA AMP: CPT | Performed by: INTERNAL MEDICINE

## 2017-04-09 PROCEDURE — 87070 CULTURE OTHR SPECIMN AEROBIC: CPT | Performed by: INTERNAL MEDICINE

## 2017-04-09 PROCEDURE — 94799 UNLISTED PULMONARY SVC/PX: CPT

## 2017-04-09 PROCEDURE — 80053 COMPREHEN METABOLIC PANEL: CPT | Performed by: INTERNAL MEDICINE

## 2017-04-09 PROCEDURE — 25010000002 FENTANYL CITRATE (PF) 100 MCG/2ML SOLUTION: Performed by: INTERNAL MEDICINE

## 2017-04-09 PROCEDURE — 94002 VENT MGMT INPAT INIT DAY: CPT

## 2017-04-09 PROCEDURE — 25010000002 PROPOFOL 1000 MG/ML EMULSION: Performed by: INTERNAL MEDICINE

## 2017-04-09 PROCEDURE — 36600 WITHDRAWAL OF ARTERIAL BLOOD: CPT

## 2017-04-09 PROCEDURE — 25010000002 METHYLPREDNISOLONE PER 125 MG

## 2017-04-09 PROCEDURE — 87081 CULTURE SCREEN ONLY: CPT | Performed by: INTERNAL MEDICINE

## 2017-04-09 PROCEDURE — 5A1945Z RESPIRATORY VENTILATION, 24-96 CONSECUTIVE HOURS: ICD-10-PCS | Performed by: INTERNAL MEDICINE

## 2017-04-09 PROCEDURE — 0BH17EZ INSERTION OF ENDOTRACHEAL AIRWAY INTO TRACHEA, VIA NATURAL OR ARTIFICIAL OPENING: ICD-10-PCS | Performed by: INTERNAL MEDICINE

## 2017-04-09 PROCEDURE — 87205 SMEAR GRAM STAIN: CPT | Performed by: INTERNAL MEDICINE

## 2017-04-09 PROCEDURE — 25010000002 PROPOFOL 10 MG/ML EMULSION

## 2017-04-09 PROCEDURE — 25010000002 METHYLPREDNISOLONE PER 40 MG: Performed by: INTERNAL MEDICINE

## 2017-04-09 PROCEDURE — 25010000002 FENTANYL CITRATE (PF) 100 MCG/2ML SOLUTION

## 2017-04-09 PROCEDURE — 87486 CHLMYD PNEUM DNA AMP PROBE: CPT | Performed by: INTERNAL MEDICINE

## 2017-04-09 PROCEDURE — 25010000002 TERBUTALINE PER 1 MG: Performed by: INTERNAL MEDICINE

## 2017-04-09 PROCEDURE — 25010000002 ENOXAPARIN PER 10 MG: Performed by: INTERNAL MEDICINE

## 2017-04-09 PROCEDURE — 85025 COMPLETE CBC W/AUTO DIFF WBC: CPT | Performed by: INTERNAL MEDICINE

## 2017-04-09 PROCEDURE — 71010 HC CHEST PA OR AP: CPT

## 2017-04-09 PROCEDURE — 87581 M.PNEUMON DNA AMP PROBE: CPT | Performed by: INTERNAL MEDICINE

## 2017-04-09 PROCEDURE — 87633 RESP VIRUS 12-25 TARGETS: CPT | Performed by: INTERNAL MEDICINE

## 2017-04-09 PROCEDURE — 93005 ELECTROCARDIOGRAM TRACING: CPT | Performed by: INTERNAL MEDICINE

## 2017-04-09 PROCEDURE — 82803 BLOOD GASES ANY COMBINATION: CPT

## 2017-04-09 RX ORDER — CISATRACURIUM BESYLATE 2 MG/ML
10 INJECTION, SOLUTION INTRAVENOUS ONCE
Status: DISCONTINUED | OUTPATIENT
Start: 2017-04-09 | End: 2017-04-10

## 2017-04-09 RX ORDER — ALBUTEROL SULFATE 90 UG/1
6 AEROSOL, METERED RESPIRATORY (INHALATION)
Status: DISCONTINUED | OUTPATIENT
Start: 2017-04-09 | End: 2017-04-12

## 2017-04-09 RX ORDER — FENTANYL CITRATE 50 UG/ML
200 INJECTION, SOLUTION INTRAMUSCULAR; INTRAVENOUS ONCE
Status: COMPLETED | OUTPATIENT
Start: 2017-04-09 | End: 2017-04-09

## 2017-04-09 RX ORDER — METHYLPREDNISOLONE SODIUM SUCCINATE 125 MG/2ML
125 INJECTION, POWDER, LYOPHILIZED, FOR SOLUTION INTRAMUSCULAR; INTRAVENOUS ONCE
Status: COMPLETED | OUTPATIENT
Start: 2017-04-09 | End: 2017-04-09

## 2017-04-09 RX ORDER — TERBUTALINE SULFATE 1 MG/ML
0.25 INJECTION, SOLUTION SUBCUTANEOUS ONCE
Status: COMPLETED | OUTPATIENT
Start: 2017-04-09 | End: 2017-04-09

## 2017-04-09 RX ORDER — FENTANYL CITRATE 50 UG/ML
INJECTION, SOLUTION INTRAMUSCULAR; INTRAVENOUS
Status: COMPLETED
Start: 2017-04-09 | End: 2017-04-09

## 2017-04-09 RX ORDER — PROPOFOL 10 MG/ML
VIAL (ML) INTRAVENOUS
Status: DISPENSED
Start: 2017-04-09 | End: 2017-04-09

## 2017-04-09 RX ORDER — PHENYLEPHRINE HCL IN 0.9% NACL 0.5 MG/5ML
1 SYRINGE (ML) INTRAVENOUS ONCE
Status: DISCONTINUED | OUTPATIENT
Start: 2017-04-09 | End: 2017-04-09

## 2017-04-09 RX ORDER — METHYLPREDNISOLONE SODIUM SUCCINATE 40 MG/ML
20 INJECTION, POWDER, LYOPHILIZED, FOR SOLUTION INTRAMUSCULAR; INTRAVENOUS EVERY 12 HOURS
Status: DISCONTINUED | OUTPATIENT
Start: 2017-04-09 | End: 2017-04-12

## 2017-04-09 RX ORDER — METHYLPREDNISOLONE SODIUM SUCCINATE 125 MG/2ML
INJECTION, POWDER, LYOPHILIZED, FOR SOLUTION INTRAMUSCULAR; INTRAVENOUS
Status: COMPLETED
Start: 2017-04-09 | End: 2017-04-09

## 2017-04-09 RX ORDER — LORAZEPAM 2 MG/ML
0.5 INJECTION INTRAMUSCULAR EVERY 6 HOURS PRN
Status: DISCONTINUED | OUTPATIENT
Start: 2017-04-09 | End: 2017-04-14

## 2017-04-09 RX ORDER — CISATRACURIUM BESYLATE 2 MG/ML
INJECTION, SOLUTION INTRAVENOUS
Status: DISPENSED
Start: 2017-04-09 | End: 2017-04-09

## 2017-04-09 RX ORDER — FENTANYL CITRATE 50 UG/ML
100 INJECTION, SOLUTION INTRAMUSCULAR; INTRAVENOUS
Status: DISCONTINUED | OUTPATIENT
Start: 2017-04-09 | End: 2017-04-12

## 2017-04-09 RX ORDER — FENTANYL CITRATE 50 UG/ML
100 INJECTION, SOLUTION INTRAMUSCULAR; INTRAVENOUS
Status: COMPLETED | OUTPATIENT
Start: 2017-04-09 | End: 2017-04-11

## 2017-04-09 RX ORDER — DEXMEDETOMIDINE HYDROCHLORIDE 4 UG/ML
.2-1.5 INJECTION, SOLUTION INTRAVENOUS
Status: DISCONTINUED | OUTPATIENT
Start: 2017-04-09 | End: 2017-04-13

## 2017-04-09 RX ADMIN — PROPOFOL 50 MCG/KG/MIN: 10 INJECTION, EMULSION INTRAVENOUS at 12:51

## 2017-04-09 RX ADMIN — FENTANYL CITRATE 200 MCG: 50 INJECTION INTRAMUSCULAR; INTRAVENOUS at 11:11

## 2017-04-09 RX ADMIN — NICARDIPINE HYDROCHLORIDE 7.5 MG/HR: 25 INJECTION INTRAVENOUS at 08:44

## 2017-04-09 RX ADMIN — FENTANYL CITRATE 100 MCG: 50 INJECTION INTRAMUSCULAR; INTRAVENOUS at 16:07

## 2017-04-09 RX ADMIN — PROPOFOL 15 MCG/KG/MIN: 10 INJECTION, EMULSION INTRAVENOUS at 11:26

## 2017-04-09 RX ADMIN — METHYLPREDNISOLONE SODIUM SUCCINATE 125 MG: 125 INJECTION, POWDER, LYOPHILIZED, FOR SOLUTION INTRAMUSCULAR; INTRAVENOUS at 11:19

## 2017-04-09 RX ADMIN — ALBUTEROL SULFATE 6 PUFF: 108 AEROSOL, METERED RESPIRATORY (INHALATION) at 20:12

## 2017-04-09 RX ADMIN — METHYLPREDNISOLONE SODIUM SUCCINATE 20 MG: 40 INJECTION, POWDER, FOR SOLUTION INTRAMUSCULAR; INTRAVENOUS at 23:41

## 2017-04-09 RX ADMIN — METHYLPREDNISOLONE SODIUM SUCCINATE 125 MG: 125 INJECTION, POWDER, FOR SOLUTION INTRAMUSCULAR; INTRAVENOUS at 11:19

## 2017-04-09 RX ADMIN — ALBUTEROL SULFATE 2.5 MG: 2.5 SOLUTION RESPIRATORY (INHALATION) at 07:30

## 2017-04-09 RX ADMIN — PHENYLEPHRINE HYDROCHLORIDE 5000 MCG: 10 INJECTION INTRAVENOUS at 11:09

## 2017-04-09 RX ADMIN — FENTANYL CITRATE 100 MCG: 50 INJECTION INTRAMUSCULAR; INTRAVENOUS at 13:16

## 2017-04-09 RX ADMIN — NICARDIPINE HYDROCHLORIDE 7.5 MG/HR: 25 INJECTION INTRAVENOUS at 00:54

## 2017-04-09 RX ADMIN — LABETALOL HYDROCHLORIDE 20 MG: 5 INJECTION, SOLUTION INTRAVENOUS at 05:22

## 2017-04-09 RX ADMIN — PROPOFOL 50 MCG/KG/MIN: 10 INJECTION, EMULSION INTRAVENOUS at 20:33

## 2017-04-09 RX ADMIN — PROPOFOL 50 MCG/KG/MIN: 10 INJECTION, EMULSION INTRAVENOUS at 16:34

## 2017-04-09 RX ADMIN — TERBUTALINE SULFATE 0.25 MG: 1 INJECTION SUBCUTANEOUS at 11:17

## 2017-04-09 RX ADMIN — FENTANYL CITRATE 100 MCG: 50 INJECTION INTRAMUSCULAR; INTRAVENOUS at 19:36

## 2017-04-09 RX ADMIN — PROPOFOL 50 MCG/KG/MIN: 10 INJECTION, EMULSION INTRAVENOUS at 23:42

## 2017-04-09 RX ADMIN — NICARDIPINE HYDROCHLORIDE 7.5 MG/HR: 25 INJECTION INTRAVENOUS at 05:00

## 2017-04-09 RX ADMIN — PROPOFOL 50 MCG/KG/MIN: 10 INJECTION, EMULSION INTRAVENOUS at 13:19

## 2017-04-09 RX ADMIN — MORPHINE SULFATE 4 MG: 4 INJECTION, SOLUTION INTRAMUSCULAR; INTRAVENOUS at 00:15

## 2017-04-09 RX ADMIN — FENTANYL CITRATE 100 MCG: 50 INJECTION INTRAMUSCULAR; INTRAVENOUS at 10:51

## 2017-04-09 RX ADMIN — ALBUTEROL SULFATE 6 PUFF: 108 AEROSOL, METERED RESPIRATORY (INHALATION) at 12:40

## 2017-04-09 RX ADMIN — ENOXAPARIN SODIUM 40 MG: 40 INJECTION SUBCUTANEOUS at 21:10

## 2017-04-09 RX ADMIN — FENTANYL CITRATE 100 MCG: 50 INJECTION INTRAMUSCULAR; INTRAVENOUS at 23:41

## 2017-04-09 RX ADMIN — ALBUTEROL SULFATE 2.5 MG: 2.5 SOLUTION RESPIRATORY (INHALATION) at 03:41

## 2017-04-09 NOTE — PLAN OF CARE
Problem: Patient Care Overview (Adult)  Goal: Plan of Care Review  Outcome: Ongoing (interventions implemented as appropriate)    04/09/17 5056   Outcome Evaluation   Outcome Summary/Follow up Plan PT. UNABLE TO MAINTAIN SATS ON BIBPAP. PT. WAS INTUBATED BY DR. VÍCTOR PONCE.       Goal: Adult Individualization and Mutuality  Outcome: Ongoing (interventions implemented as appropriate)  Goal: Discharge Needs Assessment  Outcome: Ongoing (interventions implemented as appropriate)    Problem: Mechanical Ventilation, Invasive (Adult)  Goal: Signs and Symptoms of Listed Potential Problems Will be Absent or Manageable (Mechanical Ventilation, Invasive)  Outcome: Ongoing (interventions implemented as appropriate)    Problem: Respiratory Insufficiency (Adult)  Goal: Identify Related Risk Factors and Signs and Symptoms  Outcome: Ongoing (interventions implemented as appropriate)  Goal: Acid/Base Balance  Outcome: Ongoing (interventions implemented as appropriate)  Goal: Effective Ventilation  Outcome: Ongoing (interventions implemented as appropriate)    Problem: Fall Risk (Adult)  Goal: Absence of Falls  Outcome: Ongoing (interventions implemented as appropriate)    Problem: SAFETY - NON-VIOLENT RESTRAINT  Goal: Remains free of injury from restraints (Non-Violent Restraint)  Outcome: Ongoing (interventions implemented as appropriate)  Goal: Free from restraint(s) (Non-Violent Restraint)  Outcome: Ongoing (interventions implemented as appropriate)    Problem: Skin Integrity Impairment, Risk/Actual (Adult)  Goal: Skin Integrity/Wound Healing  Outcome: Ongoing (interventions implemented as appropriate)    Problem: NPPV/CPAP (Adult)  Goal: Signs and Symptoms of Listed Potential Problems Will be Absent or Manageable (NPPV/CPAP)  Outcome: Outcome(s) achieved Date Met:  04/09/17

## 2017-04-09 NOTE — NURSING NOTE
Pt. Was intubated by dr. Xie. Very difficult intubation ET 7.5  19 at the teeth. We  Will continue to monitor. Family outside they are notified.

## 2017-04-09 NOTE — PROCEDURES
Endotracheal Intubation Procedure Note    [facility] PROCEDURE NOTE    Indication for endotracheal intubation: respiratory failure.  Sedation: 20 cc of propofol IV push, followed by 100 mics of IV fentanyl push.  Equipment: A video Glidoscope was used and I was able to visualize the vocal cords.  A size 7.5 endotracheal tube was introduced between the vocal cords.  There were no complications..  Number of attempts: 1.  ETT location confirmed by by auscultation, by CXR and ETCO2 monitor.      Gerard Xie MD  4/9/2017

## 2017-04-09 NOTE — NURSING NOTE
Pt sats 88% on BiPAP 100 o2  Dr. stephanie stoddard and daughter notified.charge nurse in the room.

## 2017-04-09 NOTE — PROGRESS NOTES
Dr. ADA Xie    [facility] CORONARY CARE    4/9/2017    Patient ID:  Name:  Lupe Becker  MRN:  [id number]  1950  66 y.o.  female            CC/Reason for visit:  Follow-up for acute on chronic respiratory failure    HPI: The patient's condition is deteriorating.  She is on 100% FiO2 with noninvasive bilevel BiPAP device, and her oxygen saturations are now in the low to mid 80% range.  The patient is now agreeable to be intubated and mechanically ventilated.    This was discussed with the ICU team at the bedside.    I am proceeding immediately to intubation.    Vitals:  Vitals:    04/09/17 0803 04/09/17 0833 04/09/17 0903 04/09/17 0932   BP: 147/79 159/89 155/79 151/75   BP Location:       Patient Position:       Pulse: 88 98 99 93   Resp:       Temp:       TempSrc:       SpO2: (!) 87% (!) 85% (!) 88% 90%   Weight:       Height:         FiO2 (%): 45 %     Body mass index is 35.84 kg/(m^2).    Intake/Output Summary (Last 24 hours) at 04/09/17 1104  Last data filed at 04/09/17 0754   Gross per 24 hour   Intake             2118 ml   Output             1300 ml   Net              818 ml       Exam:  GEN:  Obese female, in respiratory distress  EYES:   anicteric sclera bilat  NECK:  Very short neck, wet neck, small jaw  LUNGS: Bilateral rhonchi, some labored and shallow breathing  CV:  Normal S1S2, without murmur, no edema  ABD:  Obese, soft.        Scheduled meds:    albuterol 2.5 mg Nebulization Q6H - RT   enoxaparin 40 mg Subcutaneous Nightly   phenylephrine      phenylephrine 1 mL Intracavernosal Once   terbutaline 0.25 mg Subcutaneous Once     IV meds:                        niCARdipine 5-15 mg/hr Last Rate: 7.5 mg/hr (04/09/17 0844)   propofol 5-50 mcg/kg/min    sodium chloride 9 mL/hr Last Rate: 9 mL/hr (04/07/17 0601)       Data Review:   I reviewed the patient's medications and new clinical results.  Lab Results   Component Value Date    CALCIUM 9.1 04/08/2017    PHOS 3.4 04/06/2017      Results from last 7 days  Lab Units 04/08/17  0325 04/07/17  0438 04/06/17  0609   AST (SGOT) U/L 24 32 49*   MAGNESIUM mg/dL  --   --  2.1   SODIUM mmol/L 150* 146* 143   POTASSIUM mmol/L 3.4* 3.8 3.7   CHLORIDE mmol/L 99 96* 98   TOTAL CO2 mmol/L 40.0* 39.1* 34.4*   BUN mg/dL 26* 23 17   CREATININE mg/dL 0.50* 0.52* 0.53*   GLUCOSE mg/dL 104* 121* 109*   CALCIUM mg/dL 9.1 9.3 8.4*   WBC 10*3/mm3 18.63* 21.04* 15.05*   HEMOGLOBIN g/dL 10.3* 10.4* 9.5*   PLATELETS 10*3/mm3 376 361 284   ALT (SGPT) U/L 51* 63* 75*           Results from last 7 days  Lab Units 04/09/17  0552   PH, ARTERIAL pH units 7.278*   PO2 ART mm Hg 52.2*   PCO2, ARTERIAL mm Hg 83.2*   HCO3 ART mmol/L 38.9*         ASSESSMENT:   Acute on chronic hypoxic and hypercapnic respiratory failure  Community acquired pneumonia  Left adrenal mass  Elevated liver enzymes  Sepsis  Paroxysmal SVT (supraventricular tachycardia)  Scoliosis    PLAN:  Proceed to immediate intubation.  Check chest x-ray and ABG after intubation, and adjust mechanical ventilator.    Prognosis is very poor.    Critical care time 35 minutes, excluding any separately billable procedure time    Gerard Xie MD  4/9/2017

## 2017-04-10 ENCOUNTER — APPOINTMENT (OUTPATIENT)
Dept: CT IMAGING | Facility: HOSPITAL | Age: 67
End: 2017-04-10

## 2017-04-10 LAB
ALBUMIN SERPL-MCNC: 2.9 G/DL (ref 3.5–5.2)
ALBUMIN/GLOB SERPL: 0.9 G/DL
ALP SERPL-CCNC: 72 U/L (ref 39–117)
ALT SERPL W P-5'-P-CCNC: 26 U/L (ref 1–33)
ANION GAP SERPL CALCULATED.3IONS-SCNC: 12.6 MMOL/L
ANION GAP SERPL CALCULATED.3IONS-SCNC: 17.9 MMOL/L
AST SERPL-CCNC: 18 U/L (ref 1–32)
BASOPHILS # BLD AUTO: 0.01 10*3/MM3 (ref 0–0.2)
BASOPHILS NFR BLD AUTO: 0.1 % (ref 0–1.5)
BILIRUB SERPL-MCNC: 0.3 MG/DL (ref 0.1–1.2)
BUN BLD-MCNC: 41 MG/DL (ref 8–23)
BUN BLD-MCNC: 44 MG/DL (ref 8–23)
BUN/CREAT SERPL: 47.7 (ref 7–25)
BUN/CREAT SERPL: 47.8 (ref 7–25)
CALCIUM SPEC-SCNC: 9.1 MG/DL (ref 8.6–10.5)
CALCIUM SPEC-SCNC: 9.2 MG/DL (ref 8.6–10.5)
CHLORIDE SERPL-SCNC: 102 MMOL/L (ref 98–107)
CHLORIDE SERPL-SCNC: 103 MMOL/L (ref 98–107)
CO2 SERPL-SCNC: 31.1 MMOL/L (ref 22–29)
CO2 SERPL-SCNC: 31.4 MMOL/L (ref 22–29)
CREAT BLD-MCNC: 0.86 MG/DL (ref 0.57–1)
CREAT BLD-MCNC: 0.92 MG/DL (ref 0.57–1)
DEPRECATED RDW RBC AUTO: 52.3 FL (ref 37–54)
EOSINOPHIL # BLD AUTO: 0 10*3/MM3 (ref 0–0.7)
EOSINOPHIL NFR BLD AUTO: 0 % (ref 0.3–6.2)
ERYTHROCYTE [DISTWIDTH] IN BLOOD BY AUTOMATED COUNT: 15.7 % (ref 11.7–13)
GFR SERPL CREATININE-BSD FRML MDRD: 61 ML/MIN/1.73
GFR SERPL CREATININE-BSD FRML MDRD: 66 ML/MIN/1.73
GLOBULIN UR ELPH-MCNC: 3.3 GM/DL
GLUCOSE BLD-MCNC: 138 MG/DL (ref 65–99)
GLUCOSE BLD-MCNC: 198 MG/DL (ref 65–99)
GLUCOSE BLDC GLUCOMTR-MCNC: 161 MG/DL (ref 70–130)
HCT VFR BLD AUTO: 33.5 % (ref 35.6–45.5)
HGB BLD-MCNC: 9.8 G/DL (ref 11.9–15.5)
IMM GRANULOCYTES # BLD: 0.14 10*3/MM3 (ref 0–0.03)
IMM GRANULOCYTES NFR BLD: 1 % (ref 0–0.5)
LYMPHOCYTES # BLD AUTO: 0.81 10*3/MM3 (ref 0.9–4.8)
LYMPHOCYTES NFR BLD AUTO: 5.8 % (ref 19.6–45.3)
MCH RBC QN AUTO: 26.6 PG (ref 26.9–32)
MCHC RBC AUTO-ENTMCNC: 29.3 G/DL (ref 32.4–36.3)
MCV RBC AUTO: 90.8 FL (ref 80.5–98.2)
MONOCYTES # BLD AUTO: 0.35 10*3/MM3 (ref 0.2–1.2)
MONOCYTES NFR BLD AUTO: 2.5 % (ref 5–12)
NEUTROPHILS # BLD AUTO: 12.69 10*3/MM3 (ref 1.9–8.1)
NEUTROPHILS NFR BLD AUTO: 90.6 % (ref 42.7–76)
PLATELET # BLD AUTO: 333 10*3/MM3 (ref 140–500)
PMV BLD AUTO: 10.3 FL (ref 6–12)
POTASSIUM BLD-SCNC: 3.2 MMOL/L (ref 3.5–5.2)
POTASSIUM BLD-SCNC: 3.4 MMOL/L (ref 3.5–5.2)
POTASSIUM BLD-SCNC: 4.2 MMOL/L (ref 3.5–5.2)
PROT SERPL-MCNC: 6.2 G/DL (ref 6–8.5)
RBC # BLD AUTO: 3.69 10*6/MM3 (ref 3.9–5.2)
SODIUM BLD-SCNC: 146 MMOL/L (ref 136–145)
SODIUM BLD-SCNC: 152 MMOL/L (ref 136–145)
WBC NRBC COR # BLD: 14 10*3/MM3 (ref 4.5–10.7)

## 2017-04-10 PROCEDURE — 94799 UNLISTED PULMONARY SVC/PX: CPT

## 2017-04-10 PROCEDURE — 84132 ASSAY OF SERUM POTASSIUM: CPT | Performed by: INTERNAL MEDICINE

## 2017-04-10 PROCEDURE — 80053 COMPREHEN METABOLIC PANEL: CPT | Performed by: INTERNAL MEDICINE

## 2017-04-10 PROCEDURE — 82962 GLUCOSE BLOOD TEST: CPT

## 2017-04-10 PROCEDURE — 25010000002 PROPOFOL 1000 MG/ML EMULSION: Performed by: INTERNAL MEDICINE

## 2017-04-10 PROCEDURE — 99232 SBSQ HOSP IP/OBS MODERATE 35: CPT | Performed by: INTERNAL MEDICINE

## 2017-04-10 PROCEDURE — 25010000002 METHYLPREDNISOLONE PER 40 MG: Performed by: INTERNAL MEDICINE

## 2017-04-10 PROCEDURE — 94667 MNPJ CHEST WALL 1ST: CPT

## 2017-04-10 PROCEDURE — 80048 BASIC METABOLIC PNL TOTAL CA: CPT | Performed by: INTERNAL MEDICINE

## 2017-04-10 PROCEDURE — 71250 CT THORAX DX C-: CPT

## 2017-04-10 PROCEDURE — 25010000003 POTASSIUM CHLORIDE PER 2 MEQ: Performed by: INTERNAL MEDICINE

## 2017-04-10 PROCEDURE — 85025 COMPLETE CBC W/AUTO DIFF WBC: CPT | Performed by: INTERNAL MEDICINE

## 2017-04-10 PROCEDURE — 25010000002 FENTANYL CITRATE (PF) 100 MCG/2ML SOLUTION: Performed by: INTERNAL MEDICINE

## 2017-04-10 PROCEDURE — 94003 VENT MGMT INPAT SUBQ DAY: CPT

## 2017-04-10 PROCEDURE — 25010000002 MORPHINE SULFATE (PF) 2 MG/ML SOLUTION: Performed by: INTERNAL MEDICINE

## 2017-04-10 RX ORDER — AMLODIPINE BESYLATE 10 MG/1
10 TABLET ORAL
Status: DISCONTINUED | OUTPATIENT
Start: 2017-04-10 | End: 2017-04-18 | Stop reason: HOSPADM

## 2017-04-10 RX ORDER — MORPHINE SULFATE 10 MG/ML
6 INJECTION INTRAMUSCULAR; INTRAVENOUS; SUBCUTANEOUS
Status: DISCONTINUED | OUTPATIENT
Start: 2017-04-10 | End: 2017-04-11 | Stop reason: SDUPTHER

## 2017-04-10 RX ORDER — ACETYLCYSTEINE 200 MG/ML
4 SOLUTION ORAL; RESPIRATORY (INHALATION)
Status: DISCONTINUED | OUTPATIENT
Start: 2017-04-10 | End: 2017-04-13

## 2017-04-10 RX ORDER — PANTOPRAZOLE SODIUM 40 MG/10ML
40 INJECTION, POWDER, LYOPHILIZED, FOR SOLUTION INTRAVENOUS
Status: DISCONTINUED | OUTPATIENT
Start: 2017-04-10 | End: 2017-04-12

## 2017-04-10 RX ORDER — DEXTROSE MONOHYDRATE 50 MG/ML
100 INJECTION, SOLUTION INTRAVENOUS CONTINUOUS
Status: ACTIVE | OUTPATIENT
Start: 2017-04-10 | End: 2017-04-10

## 2017-04-10 RX ORDER — ALBUTEROL SULFATE 90 UG/1
6 AEROSOL, METERED RESPIRATORY (INHALATION) EVERY 4 HOURS PRN
Status: CANCELLED | OUTPATIENT
Start: 2017-04-10

## 2017-04-10 RX ADMIN — FENTANYL CITRATE 100 MCG: 50 INJECTION INTRAMUSCULAR; INTRAVENOUS at 15:38

## 2017-04-10 RX ADMIN — POTASSIUM CHLORIDE 20 MEQ: 400 INJECTION, SOLUTION INTRAVENOUS at 15:27

## 2017-04-10 RX ADMIN — DEXMEDETOMIDINE HYDROCHLORIDE 1.2 MCG/KG/HR: 4 INJECTION, SOLUTION INTRAVENOUS at 14:38

## 2017-04-10 RX ADMIN — ACETYLCYSTEINE 4 ML: 200 SOLUTION ORAL; RESPIRATORY (INHALATION) at 19:52

## 2017-04-10 RX ADMIN — AMLODIPINE BESYLATE 10 MG: 10 TABLET ORAL at 16:37

## 2017-04-10 RX ADMIN — POTASSIUM CHLORIDE 20 MEQ: 400 INJECTION, SOLUTION INTRAVENOUS at 05:01

## 2017-04-10 RX ADMIN — ACETYLCYSTEINE 4 ML: 200 SOLUTION ORAL; RESPIRATORY (INHALATION) at 16:08

## 2017-04-10 RX ADMIN — ALBUTEROL SULFATE 6 PUFF: 108 AEROSOL, METERED RESPIRATORY (INHALATION) at 06:27

## 2017-04-10 RX ADMIN — MORPHINE SULFATE 6 MG: 2 INJECTION, SOLUTION INTRAMUSCULAR; INTRAVENOUS at 21:54

## 2017-04-10 RX ADMIN — METHYLPREDNISOLONE SODIUM SUCCINATE 20 MG: 40 INJECTION, POWDER, FOR SOLUTION INTRAMUSCULAR; INTRAVENOUS at 11:33

## 2017-04-10 RX ADMIN — ALBUTEROL SULFATE 6 PUFF: 108 AEROSOL, METERED RESPIRATORY (INHALATION) at 10:27

## 2017-04-10 RX ADMIN — Medication 1 APPLICATION: at 00:13

## 2017-04-10 RX ADMIN — ALBUTEROL SULFATE 6 PUFF: 108 AEROSOL, METERED RESPIRATORY (INHALATION) at 16:07

## 2017-04-10 RX ADMIN — FENTANYL CITRATE 100 MCG: 50 INJECTION INTRAMUSCULAR; INTRAVENOUS at 20:28

## 2017-04-10 RX ADMIN — DEXMEDETOMIDINE HYDROCHLORIDE 0.8 MCG/KG/HR: 4 INJECTION, SOLUTION INTRAVENOUS at 10:22

## 2017-04-10 RX ADMIN — POTASSIUM CHLORIDE 20 MEQ: 400 INJECTION, SOLUTION INTRAVENOUS at 14:26

## 2017-04-10 RX ADMIN — PANTOPRAZOLE SODIUM 40 MG: 40 INJECTION, POWDER, FOR SOLUTION INTRAVENOUS at 11:33

## 2017-04-10 RX ADMIN — DEXTROSE MONOHYDRATE 100 ML/HR: 50 INJECTION, SOLUTION INTRAVENOUS at 10:17

## 2017-04-10 RX ADMIN — DEXMEDETOMIDINE HYDROCHLORIDE 1.2 MCG/KG/HR: 4 INJECTION, SOLUTION INTRAVENOUS at 19:03

## 2017-04-10 RX ADMIN — ALBUTEROL SULFATE 6 PUFF: 108 AEROSOL, METERED RESPIRATORY (INHALATION) at 23:19

## 2017-04-10 RX ADMIN — ALBUTEROL SULFATE 6 PUFF: 108 AEROSOL, METERED RESPIRATORY (INHALATION) at 19:52

## 2017-04-10 RX ADMIN — PROPOFOL 50 MCG/KG/MIN: 10 INJECTION, EMULSION INTRAVENOUS at 06:58

## 2017-04-10 RX ADMIN — PROPOFOL 50 MCG/KG/MIN: 10 INJECTION, EMULSION INTRAVENOUS at 03:55

## 2017-04-10 RX ADMIN — DEXMEDETOMIDINE HYDROCHLORIDE 1.2 MCG/KG/HR: 4 INJECTION, SOLUTION INTRAVENOUS at 22:17

## 2017-04-10 RX ADMIN — POTASSIUM CHLORIDE 20 MEQ: 400 INJECTION, SOLUTION INTRAVENOUS at 06:05

## 2017-04-10 RX ADMIN — SODIUM CHLORIDE 9 ML/HR: 9 INJECTION, SOLUTION INTRAVENOUS at 22:17

## 2017-04-10 NOTE — PLAN OF CARE
Problem: Patient Care Overview (Adult)  Goal: Plan of Care Review    04/10/17 0451   Patient Care Overview   Progress no change   Outcome Evaluation   Outcome Summary/Follow up Plan O-2 WEANED DOWN TO 40% ON VENT. DURING THE NIGHT WITH O-2 SATS IN LOW TO MID-90'S. SATS DROP INTO 70'S WHEN HOB FLAT FOR TURNING. ACCELERATED JUNCTIONAL RHYTHM WITH HR 60'S. REMAINS SEDATED WITH PROPOFOL.          Problem: SAFETY - NON-VIOLENT RESTRAINT  Goal: Remains free of injury from restraints (Non-Violent Restraint)  Outcome: Ongoing (interventions implemented as appropriate)  Goal: Free from restraint(s) (Non-Violent Restraint)  Outcome: Ongoing (interventions implemented as appropriate)

## 2017-04-10 NOTE — PAYOR COMM NOTE
"Zurdo Lane (66 y.o. Female)              ATTENTION; NURSE REVIEW, AUTH [id number], CLINICALS FOR YOUR REVIEW, REPLY TO [facility] DEPT           SERA ANDERSON N  OR [facility]  [phone]         Date of Birth Social Security Number Address Home Phone MRN    1950  [address] [address] [phone] [id number]    Protestant Marital Status          None Single       Admission Date Admission Type Admitting Provider Attending Provider Department, Room/Bed    3/31/17 Emergency MiracleRohit MD Anaya, Gerard ESCALERA MD [facility] CORONARY CARE, 328/1    Discharge Date Discharge Disposition Discharge Destination                      Attending Provider: Gerard Xie MD     Allergies:  Epinephrine    Isolation:  None   Infection:  None   Code Status:  FULL    Ht:  59\" (149.9 cm)   Wt:  173 lb 11.6 oz (78.8 kg)    Admission Cmt:  None   Principal Problem:  None                Active Insurance as of 3/31/2017     Primary Coverage     Payor Plan Insurance Group Employer/Plan Group    [employer] [facility] [id number]     Payor Plan Address Payor Plan Phone Number Effective From Effective To    [id number] [phone] 9/1/2016     [place] [address]       Subscriber Name Subscriber Birth Date Member ID       ZURDO LANE 1950 [id number]                 Emergency Contacts      (Rel.) Home Phone Work Phone Mobile Phone    Kandace Vale (Daughter) [phone] -- --           Progress Notes  Date of Service: 4/10/2017 8:30 AM  Gerard Xie MD   Pulmonology         Dr. ADA Xie     [facility] CORONARY CARE     4/10/2017     Patient ID:  Name: Zurdo Lane  MRN: [id number]  1950  66 y.o.  female             CC/Reason for visit: Follow-up for acute on chronic respiratory failure     HPI: Patient remains intubated, mechanically ventilated, and respiratory failure. She is now on propofol for " sedation. She is receiving high-dose fentanyl. She is having increased gastric output through her NG tube. Her FiO2 has improved.     Mechanical ventilator settings were reviewed and adjusted by me this morning.     Vitals:   Vitals           Vitals:     04/10/17 0614 04/10/17 0627 04/10/17 0713 04/10/17 0815   BP: 137/79   151/67 161/73   BP Location:           Patient Position:           Pulse: 69 72 69 70   Resp:   20       Temp:     99.9 °F (37.7 °C)     TempSrc:           SpO2: 93% 92% 93% 92%   Weight:           Height:                 FiO2 (%): 40 %     Body mass index is 35.09 kg/(m^2).     Intake/Output Summary (Last 24 hours) at 04/10/17 0930  Last data filed at 04/10/17 0605    Gross per 24 hour   Intake 907 ml   Output 1037 ml   Net -130 ml         Exam:  GEN:  Patient is intubated, mechanically ventilated. She is barely arousable. She is quite somnolent, not following any commands.  EYES:   anicteric sclera bilat  ENT:   Oral exam shows endotracheal tube in good position.  NECK:  Very short neck.  LUNGS: Bilateral coarse rhonchi, but no crackles or wheezing nonlabored breathing  CV:  Normal S1S2, without murmur, no edema  ABD:  Non tender, no enlarged liver or masses  EXT:  No cyanosis or clubbing     Scheduled meds:    albuterol 6 puff Vent nebulization Q6H - RT   cisatracurium 10 mg Intravenous Once   enoxaparin 40 mg Subcutaneous Nightly   methylPREDNISolone sodium succinate 20 mg Intravenous Q12H      IV meds:   dexmedetomidine 0.2-1.5 mcg/kg/hr     niCARdipine 5-15 mg/hr Last Rate: Stopped (04/09/17 1000)   propofol 5-50 mcg/kg/min Last Rate: 50 mcg/kg/min (04/10/17 0658)   sodium chloride 9 mL/hr Last Rate: 9 mL/hr (04/07/17 0601)         Data Review:  I reviewed the patient's medications and new clinical results.        Lab Results   Component Value Date     CALCIUM 9.1 04/10/2017     PHOS 3.4 04/06/2017     Results from last 7 days  Lab Units 04/10/17  0323 04/09/17  1737 04/08/17  0325    04/06/17  0609   AST (SGOT) U/L 18 18 24 < > 49*   MAGNESIUM mg/dL --  --  --  --  2.1   SODIUM mmol/L 152* 153* 150* < > 143   POTASSIUM mmol/L 3.2* 3.7 3.4* < > 3.7   CHLORIDE mmol/L 103 102 99 < > 98   TOTAL CO2 mmol/L 31.1* 31.2* 40.0* < > 34.4*   BUN mg/dL 44* 38* 26* < > 17   CREATININE mg/dL 0.92 0.81 0.50* < > 0.53*   GLUCOSE mg/dL 138* 137* 104* < > 109*   CALCIUM mg/dL 9.1 9.2 9.1 < > 8.4*   WBC 10*3/mm3 14.00* 18.12* 18.63* < > 15.05*   HEMOGLOBIN g/dL 9.8* 10.3* 10.3* < > 9.5*   PLATELETS 10*3/mm3 333 320 376 < > 284   ALT (SGPT) U/L 26 31 51* < > 75*   < > = values in this interval not displayed.        I visualized portable chest x-ray. Tip of endotracheal tube appears to be in good position, at least 2 cm above the isabel. She has bilateral pulmonary parenchymal infiltrates. She has a very large heart. She has significant kyphoscoliosis. Right IJ is in good position.     ASSESSMENT:   Acute on chronic hypoxic and hypercapnic respiratory failure  Hyponatremia, new problem  Community acquired pneumonia  Left adrenal mass  Elevated liver enzymes  Sepsis  Paroxysmal SVT (supraventricular tachycardia)  Scoliosis     PLAN:  We will continue adjusting mechanical ventilation. Recheck ABG and chest x-ray, and try to wean FiO2 down to 30%, and PEEP at 5, if at all possible. We will try spontaneous breathing trial again tomorrow. I am waiting for some more clearing of infiltrates on her chest x-ray.     We will start free water through NG tube. We will start tube feeds tomorrow, since last night she still had significant NG output through wall suction.     Monitor respiratory culture results, and start antibiotics again if signs of infection.     Give her a bolus of D5 water IV, due to significant hypernatremia.     Critical care time 35 minutes, excluding any separately billable procedure time     Gerard Xie MD  4/10/2017

## 2017-04-10 NOTE — SIGNIFICANT NOTE
04/10/17 1119   Rehab Treatment   Discipline speech language pathologist   Rehab Evaluation   Evaluation Not Performed other (see comments)  (pt on vent)

## 2017-04-10 NOTE — PROGRESS NOTES
Dr. ADA Xie    [facility] CORONARY CARE    4/10/2017    Patient ID:  Name:  Lupe Becker  MRN:  [id number]  1950  66 y.o.  female            CC/Reason for visit: Follow-up for acute on chronic respiratory failure    HPI: Patient remains intubated, mechanically ventilated, and respiratory failure.  She is now on propofol for sedation.  She is receiving high-dose fentanyl.  She is having increased gastric output through her NG tube.  Her FiO2 has improved.    Mechanical ventilator settings were reviewed and adjusted by me this morning.    Vitals:  Vitals:    04/10/17 0614 04/10/17 0627 04/10/17 0713 04/10/17 0815   BP: 137/79  151/67 161/73   BP Location:       Patient Position:       Pulse: 69 72 69 70   Resp:  20     Temp:   99.9 °F (37.7 °C)    TempSrc:       SpO2: 93% 92% 93% 92%   Weight:       Height:         FiO2 (%): 40 %     Body mass index is 35.09 kg/(m^2).    Intake/Output Summary (Last 24 hours) at 04/10/17 0930  Last data filed at 04/10/17 0605   Gross per 24 hour   Intake              907 ml   Output             1037 ml   Net             -130 ml       Exam:  GEN:  Patient is intubated, mechanically ventilated.  She is barely arousable.  She is quite somnolent, not following any commands.  EYES:   anicteric sclera bilat  ENT:    Oral exam shows endotracheal tube in good position.  NECK:  Very short neck.  LUNGS: Bilateral coarse rhonchi, but no crackles or wheezing nonlabored breathing  CV:  Normal S1S2, without murmur, no edema  ABD:  Non tender, no enlarged liver or masses  EXT:  No cyanosis or clubbing    Scheduled meds:    albuterol 6 puff Vent nebulization Q6H - RT   cisatracurium 10 mg Intravenous Once   enoxaparin 40 mg Subcutaneous Nightly   methylPREDNISolone sodium succinate 20 mg Intravenous Q12H     IV meds:                        dexmedetomidine 0.2-1.5 mcg/kg/hr    niCARdipine 5-15 mg/hr Last Rate: Stopped (04/09/17 1000)   propofol 5-50 mcg/kg/min Last Rate: 50  mcg/kg/min (04/10/17 0658)   sodium chloride 9 mL/hr Last Rate: 9 mL/hr (04/07/17 0601)       Data Review:   I reviewed the patient's medications and new clinical results.  Lab Results   Component Value Date    CALCIUM 9.1 04/10/2017    PHOS 3.4 04/06/2017     Results from last 7 days  Lab Units 04/10/17  0323 04/09/17  1737 04/08/17  0325  04/06/17  0609   AST (SGOT) U/L 18 18 24  < > 49*   MAGNESIUM mg/dL  --   --   --   --  2.1   SODIUM mmol/L 152* 153* 150*  < > 143   POTASSIUM mmol/L 3.2* 3.7 3.4*  < > 3.7   CHLORIDE mmol/L 103 102 99  < > 98   TOTAL CO2 mmol/L 31.1* 31.2* 40.0*  < > 34.4*   BUN mg/dL 44* 38* 26*  < > 17   CREATININE mg/dL 0.92 0.81 0.50*  < > 0.53*   GLUCOSE mg/dL 138* 137* 104*  < > 109*   CALCIUM mg/dL 9.1 9.2 9.1  < > 8.4*   WBC 10*3/mm3 14.00* 18.12* 18.63*  < > 15.05*   HEMOGLOBIN g/dL 9.8* 10.3* 10.3*  < > 9.5*   PLATELETS 10*3/mm3 333 320 376  < > 284   ALT (SGPT) U/L 26 31 51*  < > 75*   < > = values in this interval not displayed.        I visualized portable chest x-ray.  Tip of endotracheal tube appears to be in good position, at least 2 cm above the isabel.  She has bilateral pulmonary parenchymal infiltrates.  She has a very large heart.  She has significant kyphoscoliosis.  Right IJ is in good position.    ASSESSMENT:   Acute on chronic hypoxic and hypercapnic respiratory failure  Hyponatremia, new problem  Community acquired pneumonia  Left adrenal mass  Elevated liver enzymes  Sepsis  Paroxysmal SVT (supraventricular tachycardia)  Scoliosis    PLAN:  We will continue adjusting mechanical ventilation.  Recheck ABG and chest x-ray, and try to wean FiO2 down to 30%, and PEEP at 5, if at all possible.  We will try spontaneous breathing trial again tomorrow.  I am waiting for some more clearing of infiltrates on her chest x-ray.    We will start free water through NG tube.  We will start tube feeds tomorrow, since last night she still had significant NG output through wall  suction.    Monitor respiratory culture results, and start antibiotics again if signs of infection.    Give her a bolus of D5 water IV, due to significant hypernatremia.    Critical care time 35 minutes, excluding any separately billable procedure time    Gerard Xie MD  4/10/2017

## 2017-04-10 NOTE — PROGRESS NOTES
"CC: PSVT    Interval History:   Sedated on Vent    Vital Signs  Temp:  [98.4 °F (36.9 °C)-99.9 °F (37.7 °C)] 99.9 °F (37.7 °C)  Heart Rate:  [64-99] 69  Resp:  [18-20] 20  BP: ()/(46-93) 151/67  FiO2 (%):  [40 %-100 %] 40 %    Intake/Output Summary (Last 24 hours) at 04/10/17 0732  Last data filed at 04/10/17 0605   Gross per 24 hour   Intake              937 ml   Output             1037 ml   Net             -100 ml     Flowsheet Rows         First Filed Value    Admission Height  59\" (149.9 cm) Documented at 03/31/2017 1210    Admission Weight  165 lb (74.8 kg) Documented at 03/31/2017 1210          PHYSICAL EXAM:  General: No acute distress  Resp:NL Rate, unlabored, clear  CV:NL rate and rhythm, NL PMI, Nl S1 and S2, no Mumur, no gallop, no rub, No JVD. Normal pedal pulses  ABD:Nl sounds, no masses or tenderness, nondistended, no quarding or rebound  Neuro:sedated  on ventilator  Extr: No edema or cyanosis, moves all extremities      Results Review:      Results from last 7 days  Lab Units 04/10/17  0323   SODIUM mmol/L 152*   POTASSIUM mmol/L 3.2*   CHLORIDE mmol/L 103   TOTAL CO2 mmol/L 31.1*   BUN mg/dL 44*   CREATININE mg/dL 0.92   GLUCOSE mg/dL 138*   CALCIUM mg/dL 9.1           Results from last 7 days  Lab Units 04/10/17  0323   WBC 10*3/mm3 14.00*   HEMOGLOBIN g/dL 9.8*   HEMATOCRIT % 33.5*   PLATELETS 10*3/mm3 333               Results from last 7 days  Lab Units 04/06/17  0609   MAGNESIUM mg/dL 2.1         @LABNT(bnp)@  I reviewed the patient's new clinical results.  I personally viewed and interpreted the patient's EKG/Telemetry data        Medication Review:   Meds reviewed      dexmedetomidine 0.2-1.5 mcg/kg/hr    niCARdipine 5-15 mg/hr Last Rate: Stopped (04/09/17 1000)   propofol 5-50 mcg/kg/min    propofol 5-50 mcg/kg/min Last Rate: 50 mcg/kg/min (04/10/17 0658)   sodium chloride 9 mL/hr Last Rate: 9 mL/hr (04/07/17 0601)       Assessment/Plan  1. Respiratory failure what appears to be " pneumonia with sepsis. On ventilator per pulmonary  2. 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  3. Hypothyroidism.  4. Mild aortic insufficiency mild mitral insufficiency.  5. Equivocal troponin. No acute event.  6.  SVT. No recurrence off B blocker.        Trace Saleh MD  04/10/17  7:32 AM

## 2017-04-11 ENCOUNTER — APPOINTMENT (OUTPATIENT)
Dept: GENERAL RADIOLOGY | Facility: HOSPITAL | Age: 67
End: 2017-04-11

## 2017-04-11 ENCOUNTER — APPOINTMENT (OUTPATIENT)
Dept: ULTRASOUND IMAGING | Facility: HOSPITAL | Age: 67
End: 2017-04-11
Attending: INTERNAL MEDICINE

## 2017-04-11 LAB
ALBUMIN SERPL-MCNC: 3.1 G/DL (ref 3.5–5.2)
ALBUMIN/GLOB SERPL: 0.9 G/DL
ALP SERPL-CCNC: 74 U/L (ref 39–117)
ALT SERPL W P-5'-P-CCNC: 23 U/L (ref 1–33)
ANION GAP SERPL CALCULATED.3IONS-SCNC: 11.3 MMOL/L
APTT PPP: 24.5 SECONDS (ref 22.7–35.4)
AST SERPL-CCNC: 15 U/L (ref 1–32)
BACTERIA SPEC RESP CULT: NORMAL
BASOPHILS # BLD AUTO: 0 10*3/MM3 (ref 0–0.2)
BASOPHILS NFR BLD AUTO: 0 % (ref 0–1.5)
BILIRUB SERPL-MCNC: 0.4 MG/DL (ref 0.1–1.2)
BUN BLD-MCNC: 35 MG/DL (ref 8–23)
BUN/CREAT SERPL: 54.7 (ref 7–25)
CALCIUM SPEC-SCNC: 9 MG/DL (ref 8.6–10.5)
CHLORIDE SERPL-SCNC: 100 MMOL/L (ref 98–107)
CO2 SERPL-SCNC: 34.7 MMOL/L (ref 22–29)
CREAT BLD-MCNC: 0.64 MG/DL (ref 0.57–1)
DEPRECATED RDW RBC AUTO: 50.2 FL (ref 37–54)
EOSINOPHIL # BLD AUTO: 0 10*3/MM3 (ref 0–0.7)
EOSINOPHIL NFR BLD AUTO: 0 % (ref 0.3–6.2)
ERYTHROCYTE [DISTWIDTH] IN BLOOD BY AUTOMATED COUNT: 15.5 % (ref 11.7–13)
GFR SERPL CREATININE-BSD FRML MDRD: 93 ML/MIN/1.73
GLOBULIN UR ELPH-MCNC: 3.5 GM/DL
GLUCOSE BLD-MCNC: 162 MG/DL (ref 65–99)
GRAM STN SPEC: NORMAL
HCT VFR BLD AUTO: 35.7 % (ref 35.6–45.5)
HGB BLD-MCNC: 10.6 G/DL (ref 11.9–15.5)
IMM GRANULOCYTES # BLD: 0.11 10*3/MM3 (ref 0–0.03)
IMM GRANULOCYTES NFR BLD: 0.7 % (ref 0–0.5)
INR PPP: 1.07 (ref 0.9–1.1)
LYMPHOCYTES # BLD AUTO: 1.23 10*3/MM3 (ref 0.9–4.8)
LYMPHOCYTES NFR BLD AUTO: 7.4 % (ref 19.6–45.3)
MCH RBC QN AUTO: 26.5 PG (ref 26.9–32)
MCHC RBC AUTO-ENTMCNC: 29.7 G/DL (ref 32.4–36.3)
MCV RBC AUTO: 89.3 FL (ref 80.5–98.2)
MONOCYTES # BLD AUTO: 0.31 10*3/MM3 (ref 0.2–1.2)
MONOCYTES NFR BLD AUTO: 1.9 % (ref 5–12)
MRSA SPEC QL CULT: NORMAL
NEUTROPHILS # BLD AUTO: 15.06 10*3/MM3 (ref 1.9–8.1)
NEUTROPHILS NFR BLD AUTO: 90 % (ref 42.7–76)
NT-PROBNP SERPL-MCNC: 1462 PG/ML (ref 5–900)
PHOSPHATE SERPL-MCNC: 3.8 MG/DL (ref 2.5–4.5)
PLATELET # BLD AUTO: 309 10*3/MM3 (ref 140–500)
PMV BLD AUTO: 10.6 FL (ref 6–12)
POTASSIUM BLD-SCNC: 3.8 MMOL/L (ref 3.5–5.2)
PROCALCITONIN SERPL-MCNC: 0.12 NG/ML (ref 0.1–0.25)
PROT SERPL-MCNC: 6.6 G/DL (ref 6–8.5)
PROTHROMBIN TIME: 13.5 SECONDS (ref 11.7–14.2)
RBC # BLD AUTO: 4 10*6/MM3 (ref 3.9–5.2)
SODIUM BLD-SCNC: 146 MMOL/L (ref 136–145)
WBC NRBC COR # BLD: 16.71 10*3/MM3 (ref 4.5–10.7)

## 2017-04-11 PROCEDURE — 83880 ASSAY OF NATRIURETIC PEPTIDE: CPT | Performed by: INTERNAL MEDICINE

## 2017-04-11 PROCEDURE — 25010000002 MORPHINE SULFATE (PF) 2 MG/ML SOLUTION: Performed by: INTERNAL MEDICINE

## 2017-04-11 PROCEDURE — 94003 VENT MGMT INPAT SUBQ DAY: CPT

## 2017-04-11 PROCEDURE — 85610 PROTHROMBIN TIME: CPT | Performed by: INTERNAL MEDICINE

## 2017-04-11 PROCEDURE — 71010 HC CHEST PA OR AP: CPT

## 2017-04-11 PROCEDURE — 25010000002 ENOXAPARIN PER 10 MG: Performed by: INTERNAL MEDICINE

## 2017-04-11 PROCEDURE — 94799 UNLISTED PULMONARY SVC/PX: CPT

## 2017-04-11 PROCEDURE — 80053 COMPREHEN METABOLIC PANEL: CPT | Performed by: INTERNAL MEDICINE

## 2017-04-11 PROCEDURE — 0W993ZZ DRAINAGE OF RIGHT PLEURAL CAVITY, PERCUTANEOUS APPROACH: ICD-10-PCS | Performed by: RADIOLOGY

## 2017-04-11 PROCEDURE — 94668 MNPJ CHEST WALL SBSQ: CPT

## 2017-04-11 PROCEDURE — 84100 ASSAY OF PHOSPHORUS: CPT | Performed by: INTERNAL MEDICINE

## 2017-04-11 PROCEDURE — 76942 ECHO GUIDE FOR BIOPSY: CPT

## 2017-04-11 PROCEDURE — 85730 THROMBOPLASTIN TIME PARTIAL: CPT | Performed by: INTERNAL MEDICINE

## 2017-04-11 PROCEDURE — 85025 COMPLETE CBC W/AUTO DIFF WBC: CPT | Performed by: INTERNAL MEDICINE

## 2017-04-11 PROCEDURE — 99233 SBSQ HOSP IP/OBS HIGH 50: CPT | Performed by: INTERNAL MEDICINE

## 2017-04-11 PROCEDURE — 25010000002 FENTANYL CITRATE (PF) 100 MCG/2ML SOLUTION: Performed by: INTERNAL MEDICINE

## 2017-04-11 PROCEDURE — 84145 PROCALCITONIN (PCT): CPT | Performed by: INTERNAL MEDICINE

## 2017-04-11 PROCEDURE — 25010000002 METHYLPREDNISOLONE PER 40 MG: Performed by: INTERNAL MEDICINE

## 2017-04-11 RX ORDER — DEXTROSE MONOHYDRATE 50 MG/ML
50 INJECTION, SOLUTION INTRAVENOUS CONTINUOUS
Status: DISCONTINUED | OUTPATIENT
Start: 2017-04-11 | End: 2017-04-14

## 2017-04-11 RX ORDER — LIDOCAINE HYDROCHLORIDE 10 MG/ML
20 INJECTION, SOLUTION INFILTRATION; PERINEURAL ONCE
Status: COMPLETED | OUTPATIENT
Start: 2017-04-11 | End: 2017-04-11

## 2017-04-11 RX ORDER — MORPHINE SULFATE 2 MG/ML
6 INJECTION, SOLUTION INTRAMUSCULAR; INTRAVENOUS
Status: DISCONTINUED | OUTPATIENT
Start: 2017-04-11 | End: 2017-04-13

## 2017-04-11 RX ADMIN — PANTOPRAZOLE SODIUM 40 MG: 40 INJECTION, POWDER, FOR SOLUTION INTRAVENOUS at 06:19

## 2017-04-11 RX ADMIN — ALBUTEROL SULFATE 6 PUFF: 108 AEROSOL, METERED RESPIRATORY (INHALATION) at 11:19

## 2017-04-11 RX ADMIN — ALBUTEROL SULFATE 6 PUFF: 108 AEROSOL, METERED RESPIRATORY (INHALATION) at 23:46

## 2017-04-11 RX ADMIN — DEXMEDETOMIDINE HYDROCHLORIDE 1.2 MCG/KG/HR: 4 INJECTION, SOLUTION INTRAVENOUS at 12:05

## 2017-04-11 RX ADMIN — ENOXAPARIN SODIUM 40 MG: 40 INJECTION SUBCUTANEOUS at 19:43

## 2017-04-11 RX ADMIN — MORPHINE SULFATE 6 MG: 2 INJECTION, SOLUTION INTRAMUSCULAR; INTRAVENOUS at 02:50

## 2017-04-11 RX ADMIN — ACETYLCYSTEINE 4 ML: 200 SOLUTION ORAL; RESPIRATORY (INHALATION) at 07:30

## 2017-04-11 RX ADMIN — ACETYLCYSTEINE 4 ML: 200 SOLUTION ORAL; RESPIRATORY (INHALATION) at 19:23

## 2017-04-11 RX ADMIN — METHYLPREDNISOLONE SODIUM SUCCINATE 20 MG: 40 INJECTION, POWDER, FOR SOLUTION INTRAMUSCULAR; INTRAVENOUS at 00:36

## 2017-04-11 RX ADMIN — MORPHINE SULFATE 6 MG: 2 INJECTION, SOLUTION INTRAMUSCULAR; INTRAVENOUS at 15:23

## 2017-04-11 RX ADMIN — AMLODIPINE BESYLATE 10 MG: 10 TABLET ORAL at 08:54

## 2017-04-11 RX ADMIN — FENTANYL CITRATE 100 MCG: 50 INJECTION INTRAMUSCULAR; INTRAVENOUS at 23:27

## 2017-04-11 RX ADMIN — FENTANYL CITRATE 100 MCG: 50 INJECTION INTRAMUSCULAR; INTRAVENOUS at 01:08

## 2017-04-11 RX ADMIN — METHYLPREDNISOLONE SODIUM SUCCINATE 20 MG: 40 INJECTION, POWDER, FOR SOLUTION INTRAMUSCULAR; INTRAVENOUS at 11:43

## 2017-04-11 RX ADMIN — DEXMEDETOMIDINE HYDROCHLORIDE 0.8 MCG/KG/HR: 4 INJECTION, SOLUTION INTRAVENOUS at 02:49

## 2017-04-11 RX ADMIN — DEXMEDETOMIDINE HYDROCHLORIDE 1.2 MCG/KG/HR: 4 INJECTION, SOLUTION INTRAVENOUS at 08:12

## 2017-04-11 RX ADMIN — FENTANYL CITRATE 100 MCG: 50 INJECTION INTRAMUSCULAR; INTRAVENOUS at 22:11

## 2017-04-11 RX ADMIN — FENTANYL CITRATE 100 MCG: 50 INJECTION INTRAMUSCULAR; INTRAVENOUS at 06:35

## 2017-04-11 RX ADMIN — LIDOCAINE HYDROCHLORIDE 20 ML: 10 INJECTION, SOLUTION INFILTRATION; PERINEURAL at 12:57

## 2017-04-11 RX ADMIN — MORPHINE SULFATE 6 MG: 2 INJECTION, SOLUTION INTRAMUSCULAR; INTRAVENOUS at 08:21

## 2017-04-11 RX ADMIN — ALBUTEROL SULFATE 6 PUFF: 108 AEROSOL, METERED RESPIRATORY (INHALATION) at 07:29

## 2017-04-11 RX ADMIN — DEXMEDETOMIDINE HYDROCHLORIDE 1 MCG/KG/HR: 4 INJECTION, SOLUTION INTRAVENOUS at 18:19

## 2017-04-11 RX ADMIN — MORPHINE SULFATE 6 MG: 2 INJECTION, SOLUTION INTRAMUSCULAR; INTRAVENOUS at 12:33

## 2017-04-11 RX ADMIN — DEXTROSE MONOHYDRATE 50 ML/HR: 50 INJECTION, SOLUTION INTRAVENOUS at 11:39

## 2017-04-11 RX ADMIN — ALBUTEROL SULFATE 6 PUFF: 108 AEROSOL, METERED RESPIRATORY (INHALATION) at 19:23

## 2017-04-11 RX ADMIN — METHYLPREDNISOLONE SODIUM SUCCINATE 20 MG: 40 INJECTION, POWDER, FOR SOLUTION INTRAMUSCULAR; INTRAVENOUS at 23:30

## 2017-04-11 NOTE — NURSING NOTE
CWOCN follow up on buttocks excoriation. RN reports it is much better. Patient's stools are more controlled at this time and her buttocks/perineum look good. They have magic barrier cream to apply as needed.

## 2017-04-11 NOTE — PROGRESS NOTES
Dr. ADA Xie    [facility] CORONARY CARE    4/11/2017    Patient ID:  Name:  Lupe Becker  MRN:  [id number]  1950  66 y.o.  female            CC/Reason for visit: Follow-up for acute on chronic respiratory failure, pleural effusion, bilateral pneumonia, respiratory insufficiency due to severe scoliosis    HPI: The patient remains intubated, mechanically ventilated.  She is awake, arousable, following all commands.  She is quite calm.  She will undergo thoracentesis today.  She is undergoing percussive chest therapy today.    Mechanical ventilation settings were reviewed and adjusted by me today.    Vitals:  Vitals:    04/11/17 0713 04/11/17 0730 04/11/17 0800 04/11/17 0815   BP: 169/93   180/96   BP Location:       Patient Position:       Pulse: 59 57 60 60   Resp:  26     Temp:  98 °F (36.7 °C)     TempSrc:  Oral     SpO2: 95% 97%  96%   Weight:       Height:         FiO2 (%): 50 %     Body mass index is 36.07 kg/(m^2).    Intake/Output Summary (Last 24 hours) at 04/11/17 0939  Last data filed at 04/11/17 0622   Gross per 24 hour   Intake             2586 ml   Output             1220 ml   Net             1366 ml       Exam:  GEN:  Patient is intubated, mechanically ventilated  Neurologic exam: Patient is awake, alert, following all commands    anicteric sclera bilat  ENT:   Oral exam shows endotracheal tube in good position.  NECK:  Very short neck.  LUNGS: Bilateral coarse rhonchi, but no crackles or wheezing nonlabored breathing  CV:  Normal S1S2, without murmur, no edema  ABD:  Non tender, no enlarged liver or masses  EXT:  No cyanosis or clubbing    Scheduled meds:    acetylcysteine 4 mL Nebulization Q12H - RT   albuterol 6 puff Vent nebulization Q6H - RT   amLODIPine 10 mg Oral Q24H   enoxaparin 40 mg Subcutaneous Nightly   methylPREDNISolone sodium succinate 20 mg Intravenous Q12H   pantoprazole 40 mg Intravenous Q AM     IV meds:                        dexmedetomidine 0.2-1.5  mcg/kg/hr Last Rate: 1.2 mcg/kg/hr (04/11/17 0812)   niCARdipine 5-15 mg/hr Last Rate: Stopped (04/09/17 1000)   sodium chloride 9 mL/hr Last Rate: 9 mL/hr (04/10/17 1936)       Data Review:   I reviewed the patient's medications and new clinical results.  Lab Results   Component Value Date    CALCIUM 9.0 04/11/2017    PHOS 3.8 04/11/2017     Results from last 7 days  Lab Units 04/11/17  0515 04/10/17  1743 04/10/17  1156 04/10/17  0323 04/09/17  1737  04/06/17  0609   AST (SGOT) U/L 15  --   --  18 18  < > 49*   MAGNESIUM mg/dL  --   --   --   --   --   --  2.1   SODIUM mmol/L 146* 146*  --  152* 153*  < > 143   POTASSIUM mmol/L 3.8 4.2 3.4* 3.2* 3.7  < > 3.7   CHLORIDE mmol/L 100 102  --  103 102  < > 98   TOTAL CO2 mmol/L 34.7* 31.4*  --  31.1* 31.2*  < > 34.4*   BUN mg/dL 35* 41*  --  44* 38*  < > 17   CREATININE mg/dL 0.64 0.86  --  0.92 0.81  < > 0.53*   GLUCOSE mg/dL 162* 198*  --  138* 137*  < > 109*   CALCIUM mg/dL 9.0 9.2  --  9.1 9.2  < > 8.4*   WBC 10*3/mm3 16.71*  --   --  14.00* 18.12*  < > 15.05*   HEMOGLOBIN g/dL 10.6*  --   --  9.8* 10.3*  < > 9.5*   PLATELETS 10*3/mm3 309  --   --  333 320  < > 284   ALT (SGPT) U/L 23  --   --  26 31  < > 75*   < > = values in this interval not displayed.        Results from last 7 days  Lab Units 04/09/17  1224   PH, ARTERIAL pH units 7.292*   PO2 ART mm Hg 53.5*   PCO2, ARTERIAL mm Hg 81.7*   HCO3 ART mmol/L 39.5*         ASSESSMENT:   Acute on chronic hypoxic and hypercapnic respiratory failure  Hypernatremia  Community acquired pneumonia  Left adrenal mass  Elevated liver enzymes  Sepsis  Paroxysmal SVT (supraventricular tachycardia)  Scoliosis    PLAN:  Continue adjusting mechanical ventilation.  Continue chest percussion therapy and perform right-sided ultrasound-guided thoracentesis today.  Hopefully all these measures will improve her oxygenation and her respiratory mechanics.  She has chronic respiratory insufficiency due to severe scoliosis and congenital  deformity.  I will try to wean her off the ventilator tomorrow after thoracentesis.    Follow cultures closely and administer antibiotics accordingly.    Her sodium is improving.  Continue free water.  Liver enzymes have normalized.  No new episodes of SVT.    Continue supportive care.    Critical care time 35 minutes, excluding any separately billable procedure time    Gerard Xie MD  4/11/2017

## 2017-04-11 NOTE — PROGRESS NOTES
"CC: PSVT    Interval History:   On vent awake and alert    Vital Signs  Temp:  [98 °F (36.7 °C)-99.3 °F (37.4 °C)] 98 °F (36.7 °C)  Heart Rate:  [51-76] 59  Resp:  [20-23] 23  BP: (109-184)/() 169/93  FiO2 (%):  [40 %-60 %] 50 %    Intake/Output Summary (Last 24 hours) at 04/11/17 0740  Last data filed at 04/11/17 0622   Gross per 24 hour   Intake             2616 ml   Output             1280 ml   Net             1336 ml     Flowsheet Rows         First Filed Value    Admission Height  59\" (149.9 cm) Documented at 03/31/2017 1210    Admission Weight  165 lb (74.8 kg) Documented at 03/31/2017 1210          PHYSICAL EXAM:  General: No acute distress  Resp:NL Rate, unlabored, clear  CV:NL rate and rhythm, NL PMI, Nl S1 and S2, no Mumur, no gallop, no rub, No JVD. Normal pedal pulses  ABD:Nl sounds, no masses or tenderness, nondistended, no quarding or rebound  Neuro: Awake on ventilator  Extr: No edema or cyanosis, moves all extremities    Results Review:      Results from last 7 days  Lab Units 04/11/17  0515   SODIUM mmol/L 146*   POTASSIUM mmol/L 3.8   CHLORIDE mmol/L 100   TOTAL CO2 mmol/L 34.7*   BUN mg/dL 35*   CREATININE mg/dL 0.64   GLUCOSE mg/dL 162*   CALCIUM mg/dL 9.0           Results from last 7 days  Lab Units 04/11/17  0515   WBC 10*3/mm3 16.71*   HEMOGLOBIN g/dL 10.6*   HEMATOCRIT % 35.7   PLATELETS 10*3/mm3 309       Results from last 7 days  Lab Units 04/11/17  0515   INR  1.07   APTT seconds 24.5           Results from last 7 days  Lab Units 04/06/17  0609   MAGNESIUM mg/dL 2.1         @LABRCNT(bnp)@  I reviewed the patient's new clinical results.  I personally viewed and interpreted the patient's EKG/Telemetry data        Medication Review:   Meds reviewed      dexmedetomidine 0.2-1.5 mcg/kg/hr Last Rate: 1 mcg/kg/hr (04/11/17 0618)   niCARdipine 5-15 mg/hr Last Rate: Stopped (04/09/17 1000)   sodium chloride 9 mL/hr Last Rate: 9 mL/hr (04/10/17 8658)       Assessment/Plan  1. Respiratory " failure what appears to be pneumonia with sepsis. On ventilator per pulmonary  2. 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  3. Hypothyroidism.  4. Mild aortic insufficiency mild mitral insufficiency.  5. Equivocal troponin. No acute event.  6. SVT. No recurrence off B blocker.  Isn't an ectopic atrial rhythm now.  Beta blockers were discontinued due to slow heart rate.        Trace Saleh MD  04/11/17  7:40 AM

## 2017-04-11 NOTE — PLAN OF CARE
Problem: Patient Care Overview (Adult)  Goal: Plan of Care Review    04/11/17 0541   Patient Care Overview   Progress progress towards functional goals is fair   Outcome Evaluation   Outcome Summary/Follow up Plan ON PRECEDEX DRIP FOR SEDATION. 'S-170'S THIS SHIFT. O-2 SATS IN MID- TO UPPER 90'S          Problem: SAFETY - NON-VIOLENT RESTRAINT  Goal: Remains free of injury from restraints (Non-Violent Restraint)  Outcome: Ongoing (interventions implemented as appropriate)  Goal: Free from restraint(s) (Non-Violent Restraint)  Outcome: Ongoing (interventions implemented as appropriate)

## 2017-04-11 NOTE — PLAN OF CARE
Problem: Patient Care Overview (Adult)  Goal: Plan of Care Review  Outcome: Ongoing (interventions implemented as appropriate)    04/11/17 1541   Coping/Psychosocial Response Interventions   Plan Of Care Reviewed With patient;family   Outcome Evaluation   Outcome Summary/Follow up Plan Right thoracentesis done. Procedure well tolerated. HR 40s - 60's. Plan of care explained to patient and family members. Will continue to monitor closely.         Problem: Mechanical Ventilation, Invasive (Adult)  Goal: Signs and Symptoms of Listed Potential Problems Will be Absent or Manageable (Mechanical Ventilation, Invasive)  Outcome: Ongoing (interventions implemented as appropriate)    Problem: Respiratory Insufficiency (Adult)  Goal: Identify Related Risk Factors and Signs and Symptoms  Outcome: Outcome(s) achieved Date Met:  04/11/17  Goal: Acid/Base Balance  Outcome: Ongoing (interventions implemented as appropriate)  Goal: Effective Ventilation  Outcome: Ongoing (interventions implemented as appropriate)    Problem: Fall Risk (Adult)  Goal: Absence of Falls  Outcome: Ongoing (interventions implemented as appropriate)    Problem: SAFETY - NON-VIOLENT RESTRAINT  Goal: Remains free of injury from restraints (Non-Violent Restraint)  Outcome: Ongoing (interventions implemented as appropriate)  Goal: Free from restraint(s) (Non-Violent Restraint)  Outcome: Ongoing (interventions implemented as appropriate)

## 2017-04-12 LAB
ALBUMIN SERPL-MCNC: 3.1 G/DL (ref 3.5–5.2)
ALBUMIN/GLOB SERPL: 0.9 G/DL
ALP SERPL-CCNC: 75 U/L (ref 39–117)
ALT SERPL W P-5'-P-CCNC: 26 U/L (ref 1–33)
ANION GAP SERPL CALCULATED.3IONS-SCNC: 11.5 MMOL/L
ARTERIAL PATENCY WRIST A: POSITIVE
AST SERPL-CCNC: 23 U/L (ref 1–32)
ATMOSPHERIC PRESS: 759.2 MMHG
BASE EXCESS BLDA CALC-SCNC: 7.5 MMOL/L (ref 0–2)
BASOPHILS # BLD AUTO: 0 10*3/MM3 (ref 0–0.2)
BASOPHILS NFR BLD AUTO: 0 % (ref 0–1.5)
BDY SITE: ABNORMAL
BILIRUB SERPL-MCNC: 0.8 MG/DL (ref 0.1–1.2)
BUN BLD-MCNC: 23 MG/DL (ref 8–23)
BUN/CREAT SERPL: 44.2 (ref 7–25)
CALCIUM SPEC-SCNC: 8.6 MG/DL (ref 8.6–10.5)
CHLORIDE SERPL-SCNC: 95 MMOL/L (ref 98–107)
CO2 SERPL-SCNC: 30.5 MMOL/L (ref 22–29)
CREAT BLD-MCNC: 0.52 MG/DL (ref 0.57–1)
DEPRECATED RDW RBC AUTO: 46 FL (ref 37–54)
EOSINOPHIL # BLD AUTO: 0 10*3/MM3 (ref 0–0.7)
EOSINOPHIL NFR BLD AUTO: 0 % (ref 0.3–6.2)
ERYTHROCYTE [DISTWIDTH] IN BLOOD BY AUTOMATED COUNT: 14.8 % (ref 11.7–13)
GFR SERPL CREATININE-BSD FRML MDRD: 118 ML/MIN/1.73
GLOBULIN UR ELPH-MCNC: 3.4 GM/DL
GLUCOSE BLD-MCNC: 163 MG/DL (ref 65–99)
HCO3 BLDA-SCNC: 32.9 MMOL/L (ref 22–28)
HCT VFR BLD AUTO: 35.1 % (ref 35.6–45.5)
HGB BLD-MCNC: 10.9 G/DL (ref 11.9–15.5)
HOROWITZ INDEX BLD+IHG-RTO: 40 %
IMM GRANULOCYTES # BLD: 0.05 10*3/MM3 (ref 0–0.03)
IMM GRANULOCYTES NFR BLD: 0.3 % (ref 0–0.5)
LYMPHOCYTES # BLD AUTO: 0.74 10*3/MM3 (ref 0.9–4.8)
LYMPHOCYTES NFR BLD AUTO: 4.4 % (ref 19.6–45.3)
MCH RBC QN AUTO: 26.7 PG (ref 26.9–32)
MCHC RBC AUTO-ENTMCNC: 31.1 G/DL (ref 32.4–36.3)
MCV RBC AUTO: 86 FL (ref 80.5–98.2)
MODALITY: ABNORMAL
MONOCYTES # BLD AUTO: 0.85 10*3/MM3 (ref 0.2–1.2)
MONOCYTES NFR BLD AUTO: 5.1 % (ref 5–12)
NEUTROPHILS # BLD AUTO: 15.08 10*3/MM3 (ref 1.9–8.1)
NEUTROPHILS NFR BLD AUTO: 90.2 % (ref 42.7–76)
O2 A-A PPRESDIFF RESPIRATORY: 0.4 MMHG
PCO2 BLDA: 48.3 MM HG (ref 35–45)
PEEP RESPIRATORY: 8 CM[H2O]
PH BLDA: 7.44 PH UNITS (ref 7.35–7.45)
PLATELET # BLD AUTO: 280 10*3/MM3 (ref 140–500)
PMV BLD AUTO: 10.7 FL (ref 6–12)
PO2 BLDA: 92.4 MM HG (ref 80–100)
POTASSIUM BLD-SCNC: 3.7 MMOL/L (ref 3.5–5.2)
PROT SERPL-MCNC: 6.5 G/DL (ref 6–8.5)
PSV: 4 CMH2O
RBC # BLD AUTO: 4.08 10*6/MM3 (ref 3.9–5.2)
SAO2 % BLDCOA: 97.3 % (ref 92–99)
SODIUM BLD-SCNC: 137 MMOL/L (ref 136–145)
TOTAL RATE: 27 BREATHS/MINUTE
VENTILATOR MODE: ABNORMAL
VT ON VENT VENT: 270 ML
WBC NRBC COR # BLD: 16.72 10*3/MM3 (ref 4.5–10.7)

## 2017-04-12 PROCEDURE — 94668 MNPJ CHEST WALL SBSQ: CPT

## 2017-04-12 PROCEDURE — 25010000002 MORPHINE SULFATE (PF) 2 MG/ML SOLUTION: Performed by: INTERNAL MEDICINE

## 2017-04-12 PROCEDURE — 85025 COMPLETE CBC W/AUTO DIFF WBC: CPT | Performed by: INTERNAL MEDICINE

## 2017-04-12 PROCEDURE — 82803 BLOOD GASES ANY COMBINATION: CPT

## 2017-04-12 PROCEDURE — 94799 UNLISTED PULMONARY SVC/PX: CPT

## 2017-04-12 PROCEDURE — 80053 COMPREHEN METABOLIC PANEL: CPT | Performed by: INTERNAL MEDICINE

## 2017-04-12 PROCEDURE — 36600 WITHDRAWAL OF ARTERIAL BLOOD: CPT

## 2017-04-12 PROCEDURE — 94003 VENT MGMT INPAT SUBQ DAY: CPT

## 2017-04-12 PROCEDURE — 25010000002 ENOXAPARIN PER 10 MG: Performed by: INTERNAL MEDICINE

## 2017-04-12 PROCEDURE — 63710000001 PREDNISONE PER 1 MG: Performed by: INTERNAL MEDICINE

## 2017-04-12 PROCEDURE — 99232 SBSQ HOSP IP/OBS MODERATE 35: CPT | Performed by: INTERNAL MEDICINE

## 2017-04-12 RX ORDER — BISOPROLOL FUMARATE AND HYDROCHLOROTHIAZIDE 2.5; 6.25 MG/1; MG/1
2 TABLET ORAL
Status: DISCONTINUED | OUTPATIENT
Start: 2017-04-12 | End: 2017-04-18 | Stop reason: HOSPADM

## 2017-04-12 RX ORDER — PREDNISONE 20 MG/1
20 TABLET ORAL DAILY
Status: DISCONTINUED | OUTPATIENT
Start: 2017-04-12 | End: 2017-04-13

## 2017-04-12 RX ORDER — LANSOPRAZOLE
30 KIT EVERY MORNING
Status: DISCONTINUED | OUTPATIENT
Start: 2017-04-13 | End: 2017-04-13

## 2017-04-12 RX ORDER — FENTANYL CITRATE 50 UG/ML
50 INJECTION, SOLUTION INTRAMUSCULAR; INTRAVENOUS
Status: DISCONTINUED | OUTPATIENT
Start: 2017-04-12 | End: 2017-04-13

## 2017-04-12 RX ADMIN — PANTOPRAZOLE SODIUM 40 MG: 40 INJECTION, POWDER, FOR SOLUTION INTRAVENOUS at 05:21

## 2017-04-12 RX ADMIN — MORPHINE SULFATE 6 MG: 2 INJECTION, SOLUTION INTRAMUSCULAR; INTRAVENOUS at 22:04

## 2017-04-12 RX ADMIN — ACETYLCYSTEINE 4 ML: 200 SOLUTION ORAL; RESPIRATORY (INHALATION) at 06:41

## 2017-04-12 RX ADMIN — ALBUTEROL SULFATE 2.5 MG: 2.5 SOLUTION RESPIRATORY (INHALATION) at 20:24

## 2017-04-12 RX ADMIN — ALBUTEROL SULFATE 6 PUFF: 108 AEROSOL, METERED RESPIRATORY (INHALATION) at 06:41

## 2017-04-12 RX ADMIN — PREDNISONE 20 MG: 20 TABLET ORAL at 12:59

## 2017-04-12 RX ADMIN — DEXMEDETOMIDINE HYDROCHLORIDE 0.6 MCG/KG/HR: 4 INJECTION, SOLUTION INTRAVENOUS at 09:18

## 2017-04-12 RX ADMIN — MORPHINE SULFATE 6 MG: 2 INJECTION, SOLUTION INTRAMUSCULAR; INTRAVENOUS at 00:32

## 2017-04-12 RX ADMIN — DEXMEDETOMIDINE HYDROCHLORIDE 0.6 MCG/KG/HR: 4 INJECTION, SOLUTION INTRAVENOUS at 01:26

## 2017-04-12 RX ADMIN — BISOPROLOL FUMARATE AND HYDROCHLOROTHIAZIDE 2 TABLET: 6.25; 2.5 TABLET ORAL at 12:59

## 2017-04-12 RX ADMIN — MORPHINE SULFATE 6 MG: 2 INJECTION, SOLUTION INTRAMUSCULAR; INTRAVENOUS at 05:21

## 2017-04-12 RX ADMIN — ENOXAPARIN SODIUM 40 MG: 40 INJECTION SUBCUTANEOUS at 21:00

## 2017-04-12 RX ADMIN — AMLODIPINE BESYLATE 10 MG: 10 TABLET ORAL at 09:17

## 2017-04-12 RX ADMIN — ACETYLCYSTEINE 4 ML: 200 SOLUTION ORAL; RESPIRATORY (INHALATION) at 20:24

## 2017-04-12 NOTE — PROGRESS NOTES
"CC: PSVT    Interval History:   Alert on Vent    Vital Signs  Temp:  [97.5 °F (36.4 °C)-98.7 °F (37.1 °C)] 98.7 °F (37.1 °C)  Heart Rate:  [45-68] 62  Resp:  [22-26] 26  BP: (102-183)/() 167/84  FiO2 (%):  [50 %] 50 %    Intake/Output Summary (Last 24 hours) at 04/12/17 0723  Last data filed at 04/12/17 0500   Gross per 24 hour   Intake             2017 ml   Output             1750 ml   Net              267 ml     Flowsheet Rows         First Filed Value    Admission Height  59\" (149.9 cm) Documented at 03/31/2017 1210    Admission Weight  165 lb (74.8 kg) Documented at 03/31/2017 1210          PHYSICAL EXAM:  General: No acute distress  Resp:NL Rate, unlabored, clear  CV:NL rate and rhythm, NL PMI, Nl S1 and S2, no Mumur, no gallop, no rub, No JVD. Normal pedal pulses  ABD:Nl sounds, no masses or tenderness, nondistended, no quarding or rebound  Neuro: Awake on ventilator  Extr: No edema or cyanosis, moves all extremities      Results Review:      Results from last 7 days  Lab Units 04/12/17  0524   SODIUM mmol/L 137   POTASSIUM mmol/L 3.7   CHLORIDE mmol/L 95*   TOTAL CO2 mmol/L 30.5*   BUN mg/dL 23   CREATININE mg/dL 0.52*   GLUCOSE mg/dL 163*   CALCIUM mg/dL 8.6           Results from last 7 days  Lab Units 04/12/17  0524   WBC 10*3/mm3 16.72*   HEMOGLOBIN g/dL 10.9*   HEMATOCRIT % 35.1*   PLATELETS 10*3/mm3 280       Results from last 7 days  Lab Units 04/11/17  0515   INR  1.07   APTT seconds 24.5           Results from last 7 days  Lab Units 04/06/17  0609   MAGNESIUM mg/dL 2.1         @LABRCNT(bnp)@  I reviewed the patient's new clinical results.  I personally viewed and interpreted the patient's EKG/Telemetry data        Medication Review:   Meds reviewed      dexmedetomidine 0.2-1.5 mcg/kg/hr Last Rate: 0.6 mcg/kg/hr (04/12/17 0126)   dextrose 50 mL/hr Last Rate: 50 mL/hr (04/11/17 1139)   niCARdipine 5-15 mg/hr Last Rate: Stopped (04/09/17 1000)   sodium chloride 9 mL/hr Last Rate: 9 mL/hr (04/10/17 " 4590)       Assessment/Plan  1. Respiratory failure what appears to be pneumonia with sepsis. On ventilator per pulmonary  2. 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  3. Hypothyroidism.  4. Mild aortic insufficiency mild mitral insufficiency.  5. Equivocal troponin. No acute event.  6. SVT. No recurrence off B blocker.  In an ectopic atrial rhythm now. Beta blockers were discontinued due to slow heart rate.        Trace Saleh MD  04/12/17  7:23 AM

## 2017-04-12 NOTE — PROGRESS NOTES
Dr. ADA Xie    [facility]    4/12/2017    Patient ID:  Name:  Lupe Becker  MRN:  [id number]  1950  66 y.o.  female            CC/Reason for visit: Follow-up for acute on chronic respiratory failure, pleural effusion, bilateral pneumonia, respiratory insufficiency due to severe scoliosis    HPI: The patient is arousable, awake, intubated.  She is following all commands.  She has had uncontrolled hypertension again.    Mechanical ventilator settings were reviewed and adjusted by me.    Vitals:  Vitals:    04/12/17 0641 04/12/17 0700 04/12/17 0800 04/12/17 0900   BP:   180/89 (!) 185/94   BP Location:       Patient Position:       Pulse: 62  69 63   Resp: 26      Temp:  98.7 °F (37.1 °C)     TempSrc:       SpO2: 99%  99% 96%   Weight:       Height:         FiO2 (%): 50 %     Body mass index is 35.76 kg/(m^2).    Intake/Output Summary (Last 24 hours) at 04/12/17 1004  Last data filed at 04/12/17 0500   Gross per 24 hour   Intake             2017 ml   Output             1450 ml   Net              567 ml       Exam:  GEN: Patient is intubated, mechanically ventilated  Neurologic exam: Patient is awake, alert, following all commands  anicteric sclera bilat  ENT: Oral exam shows endotracheal tube in good position.  NECK: Very short neck.  LUNGS: Bilateral coarse rhonchi, but no crackles or wheezing nonlabored breathing  CV: Normal S1S2, without murmur, no edema  ABD: Non tender, no enlarged liver or masses  EXT: No cyanosis or clubbing    Scheduled meds:    acetylcysteine 4 mL Nebulization Q12H - RT   albuterol 6 puff Vent nebulization Q6H - RT   amLODIPine 10 mg Oral Q24H   enoxaparin 40 mg Subcutaneous Nightly   methylPREDNISolone sodium succinate 20 mg Intravenous Q12H   pantoprazole 40 mg Intravenous Q AM     IV meds:                        dexmedetomidine 0.2-1.5 mcg/kg/hr Last Rate: 0.6 mcg/kg/hr (04/12/17 0918)   dextrose 50 mL/hr Last Rate: 50 mL/hr (04/11/17 1139)    niCARdipine 5-15 mg/hr Last Rate: Stopped (04/09/17 1000)   sodium chloride 9 mL/hr Last Rate: 9 mL/hr (04/10/17 8228)       Data Review:   I reviewed the patient's medications and new clinical results.  Lab Results   Component Value Date    CALCIUM 8.6 04/12/2017    PHOS 3.8 04/11/2017     Results from last 7 days  Lab Units 04/12/17  0524 04/11/17  0515 04/10/17  1743  04/10/17  0323  04/06/17  0609   AST (SGOT) U/L 23 15  --   --  18  < > 49*   MAGNESIUM mg/dL  --   --   --   --   --   --  2.1   SODIUM mmol/L 137 146* 146*  --  152*  < > 143   POTASSIUM mmol/L 3.7 3.8 4.2  < > 3.2*  < > 3.7   CHLORIDE mmol/L 95* 100 102  --  103  < > 98   TOTAL CO2 mmol/L 30.5* 34.7* 31.4*  --  31.1*  < > 34.4*   BUN mg/dL 23 35* 41*  --  44*  < > 17   CREATININE mg/dL 0.52* 0.64 0.86  --  0.92  < > 0.53*   GLUCOSE mg/dL 163* 162* 198*  --  138*  < > 109*   CALCIUM mg/dL 8.6 9.0 9.2  --  9.1  < > 8.4*   WBC 10*3/mm3 16.72* 16.71*  --   --  14.00*  < > 15.05*   HEMOGLOBIN g/dL 10.9* 10.6*  --   --  9.8*  < > 9.5*   PLATELETS 10*3/mm3 280 309  --   --  333  < > 284   ALT (SGPT) U/L 26 23  --   --  26  < > 75*   < > = values in this interval not displayed.        Results from last 7 days  Lab Units 04/12/17  0931   PH, ARTERIAL pH units 7.441   PO2 ART mm Hg 92.4   PCO2, ARTERIAL mm Hg 48.3*   HCO3 ART mmol/L 32.9*     I visualized portable chest x-ray.  Endotracheal tube is in good position.  She still has patchy bilateral airspace disease.    ASSESSMENT:   Acute on chronic hypoxic and hypercapnic respiratory failure  Hypernatremia  Community acquired pneumonia  Left adrenal mass  Elevated liver enzymes  Sepsis  Paroxysmal SVT (supraventricular tachycardia)  Scoliosis    PLAN:  I will continue adjusting mechanical ventilation.  I have just started her on a spontaneous breathing trial with pressure support of 4.  I turned down her FiO2 from 50-40%.  Her PEEP is still 8.  We will reduce the ventilator lower alarm limits tidal  volume to 200 cc.  She is very small in size and has significant congenital abnormalities in including significant scoliosis causing restaurant insufficiency.  We will obtain blood gas and weaning parameters within 45 minutes.    I will increase intravenous antihypertensive medications due to persistent uncontrolled hypertension.    Total critical care time 35 minutes, excluding any separately billable procedure time    Gerard Xie MD  4/12/2017

## 2017-04-12 NOTE — PLAN OF CARE
Problem: Patient Care Overview (Adult)  Goal: Plan of Care Review  Outcome: Ongoing (interventions implemented as appropriate)    04/12/17 7453   Coping/Psychosocial Response Interventions   Plan Of Care Reviewed With patient   Outcome Evaluation   Outcome Summary/Follow up Plan Extubated this AM. No acute respiratory distress noted. Deep breathing and coughing exrecises, incentive spirometry use encouraged. Family members at bedside. Payient and fami         Problem: Mechanical Ventilation, Invasive (Adult)  Goal: Signs and Symptoms of Listed Potential Problems Will be Absent or Manageable (Mechanical Ventilation, Invasive)  Outcome: Outcome(s) achieved Date Met:  04/12/17    Problem: Respiratory Insufficiency (Adult)  Goal: Acid/Base Balance  Outcome: Ongoing (interventions implemented as appropriate)  Goal: Effective Ventilation  Outcome: Ongoing (interventions implemented as appropriate)    Problem: Fall Risk (Adult)  Goal: Absence of Falls  Outcome: Ongoing (interventions implemented as appropriate)    Problem: SAFETY - NON-VIOLENT RESTRAINT  Goal: Remains free of injury from restraints (Non-Violent Restraint)  Outcome: Outcome(s) achieved Date Met:  04/12/17  Goal: Free from restraint(s) (Non-Violent Restraint)  Outcome: Outcome(s) achieved Date Met:  04/12/17    Problem: Skin Integrity Impairment, Risk/Actual (Adult)  Goal: Skin Integrity/Wound Healing  Outcome: Ongoing (interventions implemented as appropriate)

## 2017-04-13 LAB
ALBUMIN SERPL-MCNC: 3.4 G/DL (ref 3.5–5.2)
ALBUMIN/GLOB SERPL: 0.9 G/DL
ALP SERPL-CCNC: 85 U/L (ref 39–117)
ALT SERPL W P-5'-P-CCNC: 45 U/L (ref 1–33)
ANION GAP SERPL CALCULATED.3IONS-SCNC: 11.5 MMOL/L
AST SERPL-CCNC: 33 U/L (ref 1–32)
BASOPHILS # BLD AUTO: 0 10*3/MM3 (ref 0–0.2)
BASOPHILS NFR BLD AUTO: 0 % (ref 0–1.5)
BILIRUB SERPL-MCNC: 0.8 MG/DL (ref 0.1–1.2)
BUN BLD-MCNC: 14 MG/DL (ref 8–23)
BUN/CREAT SERPL: 25.5 (ref 7–25)
CALCIUM SPEC-SCNC: 8.7 MG/DL (ref 8.6–10.5)
CHLORIDE SERPL-SCNC: 95 MMOL/L (ref 98–107)
CO2 SERPL-SCNC: 33.5 MMOL/L (ref 22–29)
CREAT BLD-MCNC: 0.55 MG/DL (ref 0.57–1)
DEPRECATED RDW RBC AUTO: 45.2 FL (ref 37–54)
EOSINOPHIL # BLD AUTO: 0.06 10*3/MM3 (ref 0–0.7)
EOSINOPHIL NFR BLD AUTO: 0.4 % (ref 0.3–6.2)
ERYTHROCYTE [DISTWIDTH] IN BLOOD BY AUTOMATED COUNT: 14.6 % (ref 11.7–13)
GFR SERPL CREATININE-BSD FRML MDRD: 111 ML/MIN/1.73
GLOBULIN UR ELPH-MCNC: 3.6 GM/DL
GLUCOSE BLD-MCNC: 124 MG/DL (ref 65–99)
HCT VFR BLD AUTO: 37.6 % (ref 35.6–45.5)
HGB BLD-MCNC: 11.6 G/DL (ref 11.9–15.5)
IMM GRANULOCYTES # BLD: 0.05 10*3/MM3 (ref 0–0.03)
IMM GRANULOCYTES NFR BLD: 0.3 % (ref 0–0.5)
LYMPHOCYTES # BLD AUTO: 1.49 10*3/MM3 (ref 0.9–4.8)
LYMPHOCYTES NFR BLD AUTO: 9.2 % (ref 19.6–45.3)
MCH RBC QN AUTO: 26.4 PG (ref 26.9–32)
MCHC RBC AUTO-ENTMCNC: 30.9 G/DL (ref 32.4–36.3)
MCV RBC AUTO: 85.6 FL (ref 80.5–98.2)
MONOCYTES # BLD AUTO: 1.15 10*3/MM3 (ref 0.2–1.2)
MONOCYTES NFR BLD AUTO: 7.1 % (ref 5–12)
NEUTROPHILS # BLD AUTO: 13.46 10*3/MM3 (ref 1.9–8.1)
NEUTROPHILS NFR BLD AUTO: 83 % (ref 42.7–76)
PLATELET # BLD AUTO: 311 10*3/MM3 (ref 140–500)
PMV BLD AUTO: 10.9 FL (ref 6–12)
POTASSIUM BLD-SCNC: 3 MMOL/L (ref 3.5–5.2)
PROT SERPL-MCNC: 7 G/DL (ref 6–8.5)
RBC # BLD AUTO: 4.39 10*6/MM3 (ref 3.9–5.2)
SODIUM BLD-SCNC: 140 MMOL/L (ref 136–145)
WBC NRBC COR # BLD: 16.21 10*3/MM3 (ref 4.5–10.7)

## 2017-04-13 PROCEDURE — 85025 COMPLETE CBC W/AUTO DIFF WBC: CPT | Performed by: INTERNAL MEDICINE

## 2017-04-13 PROCEDURE — 97110 THERAPEUTIC EXERCISES: CPT

## 2017-04-13 PROCEDURE — 97164 PT RE-EVAL EST PLAN CARE: CPT

## 2017-04-13 PROCEDURE — 25010000002 ENOXAPARIN PER 10 MG: Performed by: INTERNAL MEDICINE

## 2017-04-13 PROCEDURE — 94799 UNLISTED PULMONARY SVC/PX: CPT

## 2017-04-13 PROCEDURE — 63710000001 PREDNISONE PER 1 MG: Performed by: INTERNAL MEDICINE

## 2017-04-13 PROCEDURE — 92610 EVALUATE SWALLOWING FUNCTION: CPT

## 2017-04-13 PROCEDURE — 80053 COMPREHEN METABOLIC PANEL: CPT | Performed by: INTERNAL MEDICINE

## 2017-04-13 PROCEDURE — 99233 SBSQ HOSP IP/OBS HIGH 50: CPT | Performed by: INTERNAL MEDICINE

## 2017-04-13 PROCEDURE — 25010000002 MORPHINE SULFATE (PF) 2 MG/ML SOLUTION: Performed by: INTERNAL MEDICINE

## 2017-04-13 RX ORDER — PREDNISONE 10 MG/1
10 TABLET ORAL DAILY
Status: DISCONTINUED | OUTPATIENT
Start: 2017-04-14 | End: 2017-04-14

## 2017-04-13 RX ORDER — HYDRALAZINE HYDROCHLORIDE 25 MG/1
25 TABLET, FILM COATED ORAL EVERY 12 HOURS SCHEDULED
Status: DISCONTINUED | OUTPATIENT
Start: 2017-04-13 | End: 2017-04-18 | Stop reason: HOSPADM

## 2017-04-13 RX ORDER — MORPHINE SULFATE 2 MG/ML
4 INJECTION, SOLUTION INTRAMUSCULAR; INTRAVENOUS
Status: DISCONTINUED | OUTPATIENT
Start: 2017-04-13 | End: 2017-04-14

## 2017-04-13 RX ADMIN — HYDRALAZINE HYDROCHLORIDE 25 MG: 25 TABLET ORAL at 20:53

## 2017-04-13 RX ADMIN — ALBUTEROL SULFATE 2.5 MG: 2.5 SOLUTION RESPIRATORY (INHALATION) at 06:59

## 2017-04-13 RX ADMIN — METOPROLOL TARTRATE 12.5 MG: 25 TABLET ORAL at 08:51

## 2017-04-13 RX ADMIN — ENOXAPARIN SODIUM 40 MG: 40 INJECTION SUBCUTANEOUS at 20:54

## 2017-04-13 RX ADMIN — MORPHINE SULFATE 6 MG: 2 INJECTION, SOLUTION INTRAMUSCULAR; INTRAVENOUS at 03:18

## 2017-04-13 RX ADMIN — LANSOPRAZOLE 30 MG: KIT at 07:59

## 2017-04-13 RX ADMIN — BISOPROLOL FUMARATE AND HYDROCHLOROTHIAZIDE 2 TABLET: 6.25; 2.5 TABLET ORAL at 08:00

## 2017-04-13 RX ADMIN — PREDNISONE 20 MG: 20 TABLET ORAL at 08:00

## 2017-04-13 RX ADMIN — AMLODIPINE BESYLATE 10 MG: 10 TABLET ORAL at 08:00

## 2017-04-13 RX ADMIN — HYDRALAZINE HYDROCHLORIDE 25 MG: 25 TABLET ORAL at 08:51

## 2017-04-13 NOTE — PLAN OF CARE
Problem: Mechanical Ventilation, Invasive (Adult)  Goal: Signs and Symptoms of Listed Potential Problems Will be Absent or Manageable (Mechanical Ventilation, Invasive)  Outcome: Outcome(s) achieved Date Met:  04/13/17    Problem: Respiratory Insufficiency (Adult)  Goal: Effective Ventilation  Outcome: Ongoing (interventions implemented as appropriate)    Problem: Fall Risk (Adult)  Goal: Absence of Falls  Outcome: Ongoing (interventions implemented as appropriate)    Problem: SAFETY - NON-VIOLENT RESTRAINT  Goal: Remains free of injury from restraints (Non-Violent Restraint)  Outcome: Outcome(s) achieved Date Met:  04/13/17  Goal: Free from restraint(s) (Non-Violent Restraint)  Outcome: Outcome(s) achieved Date Met:  04/13/17    Problem: Skin Integrity Impairment, Risk/Actual (Adult)  Goal: Skin Integrity/Wound Healing  Outcome: Ongoing (interventions implemented as appropriate)

## 2017-04-13 NOTE — PLAN OF CARE
Problem: Patient Care Overview (Adult)  Goal: Plan of Care Review    04/13/17 1151   Coping/Psychosocial Response Interventions   Plan Of Care Reviewed With patient   Patient Care Overview   Progress improving   Outcome Evaluation   Outcome Summary/Follow up Plan BSE: NPO until instrumental examination is completed; ice chips okay after oral care         Problem: Inpatient SLP  Goal: Dysphagia- Patient will improve swallowing skills to begin to take some PO safely  Outcome: Ongoing (interventions implemented as appropriate)    04/13/17 1151   Begin to Take Some PO Safely   Begin to Take Some PO Safely- SLP, Date Established 04/13/17   Begin to Take Some PO Safely- SLP, Additional Goal NPO until instrumental examination is completed; ice chips okay after oral care

## 2017-04-13 NOTE — PROGRESS NOTES
Acute Care - Physical Therapy Re-Evaluation  [facility]     Patient Name: Lupe Becker  : 1950  MRN: [id number]  Today's Date: 2017   Onset of Illness/Injury or Date of Surgery Date: 17  Date of Referral to PT: 17  Referring Physician: Anne-Marie      Admit Date: 3/31/2017     Visit Dx:    ICD-10-CM ICD-9-CM   1. Sepsis, due to unspecified organism A41.9 038.9     995.91   2. Acute respiratory failure with hypoxia and hypercapnia J96.01 518.81    J96.02    3. Pneumonia of right lower lobe due to infectious organism J18.9 483.8   4. Leukocytosis, unspecified type D72.829 288.60   5. Generalized weakness R53.1 780.79     Patient Active Problem List   Diagnosis   • Accelerated essential hypertension   • Hypoxia   • Cervical fusion syndrome   • Abnormal presence of protein in urine   • Fast heart beat   • Ventricular pre-excitation with arrhythmia   • Adult onset hypothyroidism   • Anxiety about health   • Sepsis   • Paroxysmal SVT (supraventricular tachycardia)     Past Medical History:   Diagnosis Date   • Bicornate uterus 10/10/2016   • Breast lump 10/10/2016    Description: right   • Hypertension    • Mitral valve regurgitation    • Lis-Parkinson-White (WPW) pattern      Past Surgical History:   Procedure Laterality Date   • APPENDECTOMY     • RHINOPLASTY     • TONSILLECTOMY            PT ASSESSMENT (last 72 hours)      PT Evaluation       17 1208 17 1152    Rehab Evaluation    Document Type re-evaluation  -PC evaluation  -NB    Subjective Information agree to therapy  -PC no complaints;agree to therapy  -NB (r) AT (t) NB (c)    Patient Effort, Rehab Treatment good  -PC good  -NB (r) AT (t) NB (c)    Symptoms Noted During/After Treatment shortness of breath  -PC none  -NB (r) AT (t) NB (c)    Symptoms Noted Comment o2 sats dec to 88%, recovered to 91%  -PC     General Information    Patient Profile Review yes  -PC     Onset of Illness/Injury or Date of Surgery Date 17   -PC     Referring Physician Anne-Marie  -     General Observations female pt seen in CCU, on NC O2, mild distress, shaky  -PC     Pertinent History Of Current Problem significant in that pt had to be reintubated, now is extubated  -PC     Precautions/Limitations fall precautions  -PC     Plans/Goals Discussed With patient  -PC     Barriers to Rehab medically complex  -PC     Clinical Impression    Date of Referral to PT 04/13/17  -PC     Criteria for Skilled Therapeutic Interventions Met yes;treatment indicated  -PC     Pathology/Pathophysiology Noted (Describe Specifically for Each System) musculoskeletal;pulmonary  -PC     Impairments Found (describe specific impairments) gait, locomotion, and balance;muscle performance;motor function  -PC     Rehab Potential fair, will monitor progress closely  -PC     Vital Signs    Pre SpO2 (%) 92  -PC     O2 Delivery Pre Treatment supplemental O2  -PC     Intra SpO2 (%) 88  -PC     O2 Delivery Intra Treatment supplemental O2  -PC     Post SpO2 (%) 91  -PC     O2 Delivery Post Treatment supplemental O2  -PC     Pain Assessment    Pain Assessment No/denies pain  -PC No/denies pain  -NB (r) AT (t) NB (c)    Cognitive Assessment/Intervention    Current Cognitive/Communication Assessment functional  -PC functional  -NB (r) AT (t) NB (c)    Orientation Status oriented x 4  -PC oriented x 4  -NB (r) AT (t) NB (c)    Follows Commands/Answers Questions 100% of the time  -% of the time  -NB (r) AT (t) NB (c)    ROM (Range of Motion)    General ROM Detail BUE 50 % limited, BLE WFL  -PC     MMT (Manual Muscle Testing)    General MMT Assessment Detail BUE 3-/5, BLE 3-/5  -PC     Bed Mobility, Assessment/Treatment    Bed Mob, Supine to Sit, [place] moderate assist (50% patient effort)  -PC     Bed Mob, Sit to Supine, [place] moderate assist (50% patient effort);maximum assist (25% patient effort);2 person assist required  -PC     Transfer Assessment/Treatment    Transfers,  Bed-Chair [place] moderate assist (50% patient effort);maximum assist (25% patient effort);2 person assist required;hand held assist  -PC     Transfers, Chair-Bed [place] moderate assist (50% patient effort);maximum assist (25% patient effort);2 person assist required;hand held assist  -PC     Transfer, Comment pt took a few sidesteps to get to BSC, then back to bed, assisted pt with standing while RN cleaning pt up  -PC     Balance Skills Training    Sitting-Level of Assistance Minimum assistance  -PC     Sitting-Balance Support Right upper extremity supported;Left upper extremity supported;Feet supported  -PC     Sitting-Balance Activities Trunk control activities  -PC     Sitting # of Minutes 4  -PC     Standing-Level of Assistance Moderate assistance;Maximum assistance;x2  -PC     Static Standing Balance Support Left upper extremity supported;Right upper extremity supported  -PC     Standing Balance # of Minutes 2  -PC     Therapy Exercises    Bilateral Lower Extremities 5 reps;ankle pumps/circles;quad sets  -PC     Positioning and Restraints    Pre-Treatment Position in bed  -PC     Post Treatment Position bed  -PC     In Bed supine;call light within reach;encouraged to call for assist  -PC       04/13/17 0434 04/12/17 2345    Muscle Tone Assessment    Bilateral Upper Extremities Muscle Tone Assessment moderately decreased tone  -KM moderately decreased tone  -KM    Bilateral Lower Extremities Muscle Tone Assessment moderately decreased tone  -KM moderately decreased tone  -KM      04/12/17 2015 04/12/17 1600    Muscle Tone Assessment    Bilateral Upper Extremities Muscle Tone Assessment moderately decreased tone  -KM moderately decreased tone  -LS    Bilateral Lower Extremities Muscle Tone Assessment moderately decreased tone  -KM moderately decreased tone  -LS      04/12/17 1200 04/12/17 0800    Muscle Tone Assessment    Bilateral Upper Extremities Muscle Tone Assessment moderately decreased tone   -LS moderately decreased tone  -LS    Bilateral Lower Extremities Muscle Tone Assessment moderately decreased tone  -LS moderately decreased tone  -LS      04/12/17 0400 04/12/17 0000    Muscle Tone Assessment    Bilateral Upper Extremities Muscle Tone Assessment moderately decreased tone  -KM moderately decreased tone  -KM    Bilateral Lower Extremities Muscle Tone Assessment moderately decreased tone  -KM moderately decreased tone  -KM      04/11/17 2000       Muscle Tone Assessment    Bilateral Upper Extremities Muscle Tone Assessment moderately decreased tone  -KM     Bilateral Lower Extremities Muscle Tone Assessment moderately decreased tone  -KM       User Key  (r) = Recorded By, (t) = Taken By, (c) = Cosigned By    Initials Name Provider Type    NELDA Duarte MS CCC-SLP Speech and Language Pathologist    PC Evonne Lau, PT Physical Therapist    KM Nichole Alston, RN Registered Nurse    GIORGIO Lepe, RN Registered Nurse    AT Marquita Erin, Speech Therapy Student Speech Therapy Student          Physical Therapy Education     Title: PT OT SLP Therapies (Active)     Topic: Physical Therapy (Active)     Point: Mobility training (Active)    Learning Progress Summary    Learner Readiness Method Response Comment Documented by Status   Patient Acceptance E,D NR  PC 04/13/17 1217 Active    Acceptance E VU,NR  MODE 04/07/17 1636 Done    Acceptance E VU  MB 04/06/17 1745 Done    Acceptance E NR  CH 04/04/17 1338 Active   Family Acceptance E VU  MB 04/06/17 1745 Done               Point: Home exercise program (Active)    Learning Progress Summary    Learner Readiness Method Response Comment Documented by Status   Patient Acceptance E,D NR  PC 04/13/17 1217 Active               Point: Body mechanics (Active)    Learning Progress Summary    Learner Readiness Method Response Comment Documented by Status   Patient Acceptance E,D NR  PC 04/13/17 1217 Active    Acceptance E VU,NR  MODE 04/07/17 1636 Done                Point: Precautions (Active)    Learning Progress Summary    Learner Readiness Method Response Comment Documented by Status   Patient Acceptance E,D NR  PC 04/13/17 1217 Active    Acceptance E VU,NR  MODE 04/07/17 1636 Done                      User Key     Initials Effective Dates Name Provider Type Discipline    PC 12/01/15 -  Evonne Lau, PT Physical Therapist PT    MODE 04/24/15 -  Miguelito Neri, PTA Physical Therapy Assistant PT     01/31/17 -  Celso Torres, PT Student PT Student PT    MB 03/06/17 -  Alexia Carias, RN Registered Nurse Nurse                PT Recommendation and Plan  Anticipated Discharge Disposition: inpatient rehabilitation facility, skilled nursing facility  Planned Therapy Interventions: balance training, bed mobility training, gait training, home exercise program, strengthening, transfer training  PT Frequency: daily  Plan of Care Review  Plan Of Care Reviewed With: patient  Outcome Summary/Follow up Plan: pt is now extubated, presents with significant weakness, limited mobility. pt will benefit from PT to address deficits and will need rehab at d/c          IP PT Goals       04/13/17 1218 04/08/17 1138 04/04/17 1339    Bed Mobility PT LTG    Bed Mobility PT LTG, Date Established 04/13/17  -PC  04/04/17  -DM (r) CH (t) DM (c)    Bed Mobility PT LTG, Time to Achieve 1 wk  -PC  1 wk  -DM (r) CH (t) DM (c)    Bed Mobility PT LTG, Activity Type supine to sit/sit to supine  -PC  all bed mobility  -DM (r) CH (t) DM (c)    Bed Mobility PT LTG, [place] Level minimum assist (75% patient effort)  -PC  moderate assist (50% patient effort);2 person assist required  -DM (r) CH (t) DM (c)    Bed Mobility PT LTG, Date Goal Reviewed  04/08/17  -LH     Bed Mobility PT LTG, Outcome  goal no longer appropriate  -LH     Transfer Training PT LTG    Transfer Training PT LTG, Date Established 04/13/17  -PC      Transfer Training PT LTG, Time to Achieve 1 wk  -PC      Transfer Training PT LTG, Activity Type  sit to stand/stand to sit  -PC      Transfer Training PT LTG, [place] Level minimum assist (75% patient effort)  -PC      Gait Training PT LTG    Gait Training Goal PT LTG, Date Established 04/13/17  -PC      Gait Training Goal PT LTG, Time to Achieve 1 wk  -PC      Gait Training Goal PT LTG, [place] Level minimum assist (75% patient effort);2 person assist required  -PC      Gait Training Goal PT LTG, Assist Device walker, rolling  -PC      Gait Training Goal PT LTG, Distance to Achieve 15 ft  -PC      Static Sitting Balance PT LTG    Static Sitting Balance PT LTG, Date Established   04/04/17  -DM (r) CH (t) DM (c)    Static Sitting Balance PT LTG, Time to Achieve   1 wk  -DM (r) CH (t) DM (c)    Static Sitting Balance PT LTG, [place] Level   supervision required  -DM (r) CH (t) DM (c)    Static Sitting Balance PT LTG, Date Goal Reviewed  04/08/17  -     Static Sitting Balance PT LTG, Outcome  goal no longer appropriate  -       User Key  (r) = Recorded By, (t) = Taken By, (c) = Cosigned By    Initials Name Provider Type    DELPHINE Montero, PT Physical Therapist    LH Idalia Remy, PT Physical Therapist    PC Evonne Lau, PT Physical Therapist    CH Celso Torres, PT Student PT Student                Outcome Measures       04/13/17 1200          How much help from another person do you currently need...    Turning from your back to your side while in flat bed without using bedrails? 2  -PC      Moving from lying on back to sitting on the side of a flat bed without bedrails? 2  -PC      Moving to and from a bed to a chair (including a wheelchair)? 2  -PC      Standing up from a chair using your arms (e.g., wheelchair, bedside chair)? 2  -PC      Climbing 3-5 steps with a railing? 1  -PC      To walk in hospital room? 2  -PC      AM-PAC 6 Clicks Score 11  -PC        User Key  (r) = Recorded By, (t) = Taken By, (c) = Cosigned By    Initials Name Provider Type    PC Evonne Lau, PT Physical  Therapist           Time Calculation:         PT Charges       04/13/17 1235          Time Calculation    Start Time 1135  -PC      Stop Time 1200  -PC      Time Calculation (min) 25 min  -PC      PT Received On 04/13/17  -PC      PT - Next Appointment 04/14/17  -PC      PT Goal Re-Cert Due Date 04/20/17  -PC        User Key  (r) = Recorded By, (t) = Taken By, (c) = Cosigned By    Initials Name Provider Type    PC Evonne Lau, PT Physical Therapist          Therapy Charges for Today     Code Description Service Date Service Provider Modifiers Qty    06298316426 HC PT RE-EVAL ESTABLISHED PLAN 2 4/13/2017 Evonne Lau, PT GP 1    94474963518 HC PT THER PROC EA 15 MIN 4/13/2017 Evonne Lau, PT GP 1    64876179732 HC PT THER SUPP EA 15 MIN 4/13/2017 Evonne Lau, PT GP 1          PT G-Codes  Outcome Measure Options: AM-PAC 6 Clicks Basic Mobility (PT)      Evonne Lau PT  4/13/2017

## 2017-04-13 NOTE — PROGRESS NOTES
"CC: PSVT    Interval History:   Off the ventilator no complaints.  She is concerned about some of the breathing treatment she may get me affect her heart rate.    Vital Signs  Temp:  [97.6 °F (36.4 °C)-98.2 °F (36.8 °C)] 97.6 °F (36.4 °C)  Heart Rate:  [52-87] 73  Resp:  [16-24] 20  BP: (137-197)/(69-94) 197/88    Intake/Output Summary (Last 24 hours) at 04/13/17 0708  Last data filed at 04/13/17 0434   Gross per 24 hour   Intake             1295 ml   Output             3300 ml   Net            -2005 ml     Flowsheet Rows         First Filed Value    Admission Height  59\" (149.9 cm) Documented at 03/31/2017 1210    Admission Weight  165 lb (74.8 kg) Documented at 03/31/2017 1210          PHYSICAL EXAM:  General: No acute distress  Resp:NL Rate, unlabored, Diminished in the bases  CV:NL rate and rhythm, NL PMI, Nl S1 and S2, no Mumur, no gallop, no rub, No JVD. Normal pedal pulses  ABD:Nl sounds, no masses or tenderness, nondistended, no quarding or rebound  Neuro: Awake on ventilator  Extr: No edema or cyanosis, moves all extremities    Results Review:      Results from last 7 days  Lab Units 04/13/17  0435   SODIUM mmol/L 140   POTASSIUM mmol/L 3.0*   CHLORIDE mmol/L 95*   TOTAL CO2 mmol/L 33.5*   BUN mg/dL 14   CREATININE mg/dL 0.55*   GLUCOSE mg/dL 124*   CALCIUM mg/dL 8.7           Results from last 7 days  Lab Units 04/13/17  0435   WBC 10*3/mm3 16.21*   HEMOGLOBIN g/dL 11.6*   HEMATOCRIT % 37.6   PLATELETS 10*3/mm3 311       Results from last 7 days  Lab Units 04/11/17  0515   INR  1.07   APTT seconds 24.5                 @LABRCNT(bnp)@  I reviewed the patient's new clinical results.  I personally viewed and interpreted the patient's EKG/Telemetry data        Medication Review:   Meds reviewed      dexmedetomidine 0.2-1.5 mcg/kg/hr Last Rate: Stopped (04/12/17 1845)   dextrose 50 mL/hr Last Rate: 50 mL/hr (04/11/17 1139)   niCARdipine 5-15 mg/hr Last Rate: Stopped (04/09/17 1000)   sodium chloride 9 mL/hr Last " Rate: 9 mL/hr (04/10/17 9963)       Assessment/Plan  1. Respiratory failure what appears to be pneumonia with sepsis. Now off ventilator appears stable per pulmonary  2. History WPW status post ablation in the distant past no current arrhythmia EKG shows no evidence of preexcitation.  3. Hypothyroidism.  4. Mild aortic insufficiency mild mitral insufficiency.  5. Equivocal troponin. No acute event.  6. SVT. No recurrence off B blocker.  Will try to restart low-dose beta blocker.  7.  Hypertension as above we'll add hydralazine.    Trace Saleh MD  04/13/17  7:08 AM

## 2017-04-13 NOTE — PROGRESS NOTES
Dr. ADA Xie    [facility] CORONARY CARE    4/13/2017    Patient ID:  Name:  Lupe Becker  MRN:  [id number]  1950  66 y.o.  female            CC/Reason for visit: Follow-up for acute on chronic respiratory failure    HPI: She was extubated yesterday.  She is feeling little bit better today, but still rapid shallow breathing.  She does not like bronchodilator nebulized treatments because they cause tachycardia and tremors.  She wants to stop these    Vitals:  Vitals:    04/13/17 0759 04/13/17 0851 04/13/17 0859 04/13/17 0959   BP: 171/81  143/69 147/83   BP Location:       Patient Position:       Pulse: 68 61 55 58   Resp:       Temp:       TempSrc:       SpO2: 96%  94% 90%   Weight:       Height:         FiO2 (%): 50 %     Body mass index is 33.84 kg/(m^2).    Intake/Output Summary (Last 24 hours) at 04/13/17 1010  Last data filed at 04/13/17 0434   Gross per 24 hour   Intake             1295 ml   Output             3300 ml   Net            -2005 ml       Exam:  GEN:  No distress, appears stated age  Has pectus excavatum and very webbed and short neck  LUNGS: Distant and coarse breath sounds, but no wheezing, nonlabored breathing  CV:  Normal S1S2, without murmur, no edema  ABD:  Non tender, no enlarged liver or masses  EXT:  No cyanosis or clubbing    Scheduled meds:    amLODIPine 10 mg Oral Q24H   bisoprolol-hydrochlorothiazide 2 tablet Oral Q24H   enoxaparin 40 mg Subcutaneous Nightly   hydrALAZINE 25 mg Oral Q12H   lansoprazole 30 mg Nasogastric QAM   metoprolol tartrate 12.5 mg Oral Q12H   [START ON 4/14/2017] predniSONE 10 mg Oral Daily     IV meds:                        dextrose 50 mL/hr Last Rate: 50 mL/hr (04/11/17 1139)   niCARdipine 5-15 mg/hr Last Rate: Stopped (04/09/17 1000)   sodium chloride 9 mL/hr Last Rate: 9 mL/hr (04/10/17 2487)       Data Review:   I reviewed the patient's medications and new clinical results.  Lab Results   Component Value Date    CALCIUM 8.7 04/13/2017     PHOS 3.8 04/11/2017     Results from last 7 days  Lab Units 04/13/17  0435 04/12/17  0524 04/11/17  0515   AST (SGOT) U/L 33* 23 15   SODIUM mmol/L 140 137 146*   POTASSIUM mmol/L 3.0* 3.7 3.8   CHLORIDE mmol/L 95* 95* 100   TOTAL CO2 mmol/L 33.5* 30.5* 34.7*   BUN mg/dL 14 23 35*   CREATININE mg/dL 0.55* 0.52* 0.64   GLUCOSE mg/dL 124* 163* 162*   CALCIUM mg/dL 8.7 8.6 9.0   WBC 10*3/mm3 16.21* 16.72* 16.71*   HEMOGLOBIN g/dL 11.6* 10.9* 10.6*   PLATELETS 10*3/mm3 311 280 309   ALT (SGPT) U/L 45* 26 23             ASSESSMENT:   Acute on chronic hypoxic and hypercapnic respiratory failure  Hypernatremia  Community acquired pneumonia  Left adrenal mass  Elevated liver enzymes  Sepsis  Paroxysmal SVT (supraventricular tachycardia)  Scoliosis    PLAN:  The patient does not want to be reintubated if her respiratory status worsens.  We will increase her mobility with physical therapy.    Stop bronchodilators due to SVT.    She has completed antibiotics.  There is no microbiologic evidence of any infection.    Transfer out of CCU today if okay with all consultants    Gerard Xie MD  4/13/2017

## 2017-04-13 NOTE — NURSING NOTE
Spoke with Livia Duff who spoke with Dr. Saleh who is okay with patient can moving out per Dr. Anne-Marie easley

## 2017-04-13 NOTE — PLAN OF CARE
Problem: Patient Care Overview (Adult)  Goal: Plan of Care Review  Outcome: Ongoing (interventions implemented as appropriate)    04/13/17 6056   Coping/Psychosocial Response Interventions   Plan Of Care Reviewed With patient;family;daughter   Outcome Evaluation   Outcome Summary/Follow up Plan pt doing well today. FC removed. NG removed. Pt had BSS evaluation but needs a video swallow tomorrow. For now can have ice chips and meds with applesauce. Pt able to get up to BSC with ASSIST X2. No c/o pain or SOA today. Remains on NC. D5W continues to infuse at 50 mL/hr. Pt is overflow and can move when a bed becomes available.

## 2017-04-13 NOTE — PROGRESS NOTES
Acute Care - Speech Language Pathology Initial Evaluation  [facility]     Patient Name: Lupe Becker  : 1950  MRN: [id number]  Today's Date: 2017  Onset of Illness/Injury or Date of Surgery Date: 17            Admit Date: 3/31/2017      SPEECH-LANGUAGE PATHOLOGY EVALUATION - SWALLOW  Subjective: The patient was seen on this date for a Clinical Swallow evaluation.  Patient was alert and cooperative.    The patient was admitted with severe SOA and was determined to be in respiratory failure, with ARDS and sepsis with pneumonia. Patient was intubated 3/31- and reintubated -. Patient has expressed that she does not wish to be reintubated again.   Objective: Textures given included ice chip, thin liquid, nectar thick liquid, honey thick liquid, puree consistency, mechanical soft consistency and regular consistency.  Assessment: No overt signs or symptoms of aspiration were noted. However, due to fragile respiratory and medical state will do instrumental exam to rule out oropharyngeal dysphagia.  SLP Findings:  Patient presents with possible oropharyngeal dysphagia, without esophageal component.   Recommendations: Diet Textures: NPO, Medications should be taken whole with puree. May have water and ice between meals after oral care, under staff or family supervision and with the recommended strategies for safe swallowing.   Recommended Strategies:  Oral care PRN.  Other Recommended Evaluations: VFSS  (Concerned due to anatomy that it will be difficult to achieve adequate view however patient was not agreeable to FEES)  Dysphagia therapy is recommended. Rationale: to ensure safety of swallow and rule out aspiration.  Visit Dx:    ICD-10-CM ICD-9-CM   1. Sepsis, due to unspecified organism A41.9 038.9     995.91   2. Acute respiratory failure with hypoxia and hypercapnia J96.01 518.81    J96.02    3. Pneumonia of right lower lobe due to infectious organism J18.9 483.8   4. Leukocytosis,  unspecified type D72.829 288.60   5. Generalized weakness R53.1 780.79     Patient Active Problem List   Diagnosis   • Accelerated essential hypertension   • Hypoxia   • Cervical fusion syndrome   • Abnormal presence of protein in urine   • Fast heart beat   • Ventricular pre-excitation with arrhythmia   • Adult onset hypothyroidism   • Anxiety about health   • Sepsis   • Paroxysmal SVT (supraventricular tachycardia)     Past Medical History:   Diagnosis Date   • Bicornate uterus 10/10/2016   • Breast lump 10/10/2016    Description: right   • Hypertension    • Mitral valve regurgitation    • Lis-Parkinson-White (WPW) pattern      Past Surgical History:   Procedure Laterality Date   • APPENDECTOMY     • RHINOPLASTY     • TONSILLECTOMY              EDUCATION  The patient has been educated in the following areas:   Dysphagia (Swallowing Impairment).    SLP Recommendation and Plan              Anticipated Discharge Disposition: (P) other (see comments) (unknown)                Plan of Care Review  Plan Of Care Reviewed With: (P) patient  Progress: (P) improving  Outcome Summary/Follow up Plan: (P) BSE: NPO until instrumental examination is completed; ice chips okay after oral care          IP SLP Goals       04/13/17 1151 04/07/17 1336       Begin to Take Some PO Safely    Begin to Take Some PO Safely- SLP, Date Established (P)  04/13/17  -AT 04/07/17  -KA     Begin to Take Some PO Safely- SLP, Time to Achieve  by discharge  -KA     Begin to Take Some PO Safely- SLP, Additional Goal (P)  NPO until instrumental examination is completed; ice chips okay after oral care  -AT        User Key  (r) = Recorded By, (t) = Taken By, (c) = Cosigned By    Initials Name Provider Type    MAURICIO Bennett MA,CCC-SLP Speech and Language Pathologist    [facility] Student Speech Therapy Student             SLP Outcome Measures (last 72 hours)      SLP Outcome Measures       04/13/17 1100          SLP Outcome  Measures    Outcome Measure Used? (P)  Adult NOMS  -AT      FCM Scores    FCM Chosen (P)  Swallowing  -AT      Swallowing FCM Score (P)  1  -AT        User Key  (r) = Recorded By, (t) = Taken By, (c) = Cosigned By    Initials Name Effective Dates    AT Marquita Pettit Speech Therapy Student 12/28/16 -           Time Calculation:         Time Calculation- SLP       04/13/17 1155          Time Calculation- SLP    SLP Start Time (P)  1000  -AT      SLP Stop Time (P)  1100  -AT      SLP Time Calculation (min) (P)  60 min  -AT      SLP Received On (P)  04/13/17  -AT        User Key  (r) = Recorded By, (t) = Taken By, (c) = Cosigned By    Initials Name Provider Type    AT Marquita Pettit, Speech Therapy Student Speech Therapy Student          Therapy Charges for Today     Code Description Service Date Service Provider Modifiers Qty    [id number] HC ST EVAL ORAL PHARYNG SWALLOW 4 4/13/2017 Marquita Pettit Speech Therapy Student GN 1             ADULT NOMS (last 72 hours)      Adult NOMS       04/13/17 1100                FCM Scores    FCM Chosen (P)  Swallowing  -AT        Swallowing FCM Score (P)  1  -AT          User Key  (r) = Recorded By, (t) = Taken By, (c) = Cosigned By    Initials Name Effective Dates    AT Marquita Pettit Speech Therapy Student 12/28/16 -                Marquita Pettit Speech Therapy Student  4/13/2017

## 2017-04-13 NOTE — PROGRESS NOTES
Continued Stay Note  [facility]     Patient Name: Lupe Becker  MRN: [id number]  Today's Date: 4/13/2017    Admit Date: 3/31/2017          Discharge Plan       04/13/17 1620    Case Management/Social Work Plan    Additional Comments referral called to [facility].  Left VM with referral info.      04/13/17 1616    Case Management/Social Work Plan    Plan Undetermined    Additional Comments CCP spoke with pt at bedside.  Discussed different levels of rehab and different providers.  At this time pt would like [facility] to evaluate.  States that she needs to discuss with her family and also with insurance.  May want more information once she has moved out of CCU.                Discharge Codes     None            Candy Crum RN

## 2017-04-13 NOTE — PLAN OF CARE
Problem: Patient Care Overview (Adult)  Goal: Plan of Care Review  Outcome: Ongoing (interventions implemented as appropriate)    04/13/17 1218   Coping/Psychosocial Response Interventions   Plan Of Care Reviewed With patient   Outcome Evaluation   Outcome Summary/Follow up Plan pt is now extubated, presents with significant weakness, limited mobility. pt will benefit from PT to address deficits and will need rehab at d/c         Problem: Inpatient Physical Therapy  Goal: Bed Mobility Goal LTG- PT  Outcome: Ongoing (interventions implemented as appropriate)    04/13/17 1218   Bed Mobility PT LTG   Bed Mobility PT LTG, Date Established 04/13/17   Bed Mobility PT LTG, Time to Achieve 1 wk   Bed Mobility PT LTG, Activity Type supine to sit/sit to supine   Bed Mobility PT LTG, [place] Level minimum assist (75% patient effort)       Goal: Transfer Training Goal 1 LTG- PT  Outcome: Ongoing (interventions implemented as appropriate)    04/13/17 1218   Transfer Training PT LTG   Transfer Training PT LTG, Date Established 04/13/17   Transfer Training PT LTG, Time to Achieve 1 wk   Transfer Training PT LTG, Activity Type sit to stand/stand to sit   Transfer Training PT LTG, [place] Level minimum assist (75% patient effort)       Goal: Gait Training Goal LTG- PT  Outcome: Ongoing (interventions implemented as appropriate)    04/13/17 1218   Gait Training PT LTG   Gait Training Goal PT LTG, Date Established 04/13/17   Gait Training Goal PT LTG, Time to Achieve 1 wk   Gait Training Goal PT LTG, [place] Level minimum assist (75% patient effort);2 person assist required   Gait Training Goal PT LTG, Assist Device walker, rolling   Gait Training Goal PT LTG, Distance to Achieve 15 ft

## 2017-04-13 NOTE — PAYOR COMM NOTE
"Zurdo Lane (66 y.o. Female)            ATTENTION; NURSE REVIEW, AUTH [id number], CLINICALS FOR YOUR REVIEW, REPLY TO [facility] DEPT          SERA ANDERSON N  OR UR  [phone]       Date of Birth Social Security Number Address Home Phone MRN    1950  [address] [address] [phone] [id number]    Congregational Marital Status          None Single       Admission Date Admission Type Admitting Provider Attending Provider Department, Room/Bed    3/31/17 Emergency MiracleRohit MD Anaya, Gerard ESCALERA MD [facility] CORONARY CARE, 328/1    Discharge Date Discharge Disposition Discharge Destination                      Attending Provider: Gerard Xie MD     Allergies:  Epinephrine    Isolation:  None   Infection:  None   Code Status:  FULL    Ht:  59\" (149.9 cm)   Wt:  167 lb 8.8 oz (76 kg)    Admission Cmt:  None   Principal Problem:  None                Active Insurance as of 3/31/2017     Primary Coverage     Payor Plan Insurance Group Employer/Plan Group    [facility] [employer] [facility] [employer] [facility] [id number]     Payor Plan Address Payor Plan Phone Number Effective From Effective To    [phone] 9/1/2016     [place] [address]       Subscriber Name Subscriber Birth Date Member ID       ZURDO L[address] 1950 [id number]                 Emergency Contacts      (Rel.) Home Phone Work Phone Mobile Phone    Kandace Vale (Daughter) [phone] -- --            Lines, Drains & Airways    Active LDAs     Name:   Placement date:   Placement time:   Site:   Days:    Naso/Oral/Gastric Tube 04/05/17 1347 [place] sump left nostril  04/05/17    1347      7    Urethral Catheter 03/31/17 1700 100% silicone 10  03/31/17    1700      12    Percutaneous Central Line - Quad Lumen 03/31/17 1620 internal jugular vein, right  03/31/17    1620      12                   Physician Progress Notes (last 24 hours) (Notes from " "4/12/2017 10:28 AM through 4/13/2017 10:28 AM)      Trace Saleh MD at 4/13/2017  7:08 AM  Version 1 of 1         CC: PSVT    Interval History:   Off the ventilator no complaints.  She is concerned about some of the breathing treatment she may get me affect her heart rate.    Vital Signs  Temp:  [97.6 °F (36.4 °C)-98.2 °F (36.8 °C)] 97.6 °F (36.4 °C)  Heart Rate:  [52-87] 73  Resp:  [16-24] 20  BP: (137-197)/(69-94) 197/88    Intake/Output Summary (Last 24 hours) at 04/13/17 0708  Last data filed at 04/13/17 0434   Gross per 24 hour   Intake             1295 ml   Output             3300 ml   Net            -2005 ml     Flowsheet Rows         First Filed Value    Admission Height  59\" (149.9 cm) Documented at 03/31/2017 1210    Admission Weight  165 lb (74.8 kg) Documented at 03/31/2017 1210          PHYSICAL EXAM:  General: No acute distress  Resp:NL Rate, unlabored, Diminished in the bases  CV:NL rate and rhythm, NL PMI, Nl S1 and S2, no Mumur, no gallop, no rub, No JVD. Normal pedal pulses  ABD:Nl sounds, no masses or tenderness, nondistended, no quarding or rebound  Neuro: Awake on ventilator  Extr: No edema or cyanosis, moves all extremities    Results Review:      Results from last 7 days  Lab Units 04/13/17  0435   SODIUM mmol/L 140   POTASSIUM mmol/L 3.0*   CHLORIDE mmol/L 95*   TOTAL CO2 mmol/L 33.5*   BUN mg/dL 14   CREATININE mg/dL 0.55*   GLUCOSE mg/dL 124*   CALCIUM mg/dL 8.7           Results from last 7 days  Lab Units 04/13/17  0435   WBC 10*3/mm3 16.21*   HEMOGLOBIN g/dL 11.6*   HEMATOCRIT % 37.6   PLATELETS 10*3/mm3 311       Results from last 7 days  Lab Units 04/11/17  0515   INR  1.07   APTT seconds 24.5                 @LABRCNT(bnp)@  I reviewed the patient's new clinical results.  I personally viewed and interpreted the patient's EKG/Telemetry data        Medication Review:   Meds reviewed      dexmedetomidine 0.2-1.5 mcg/kg/hr Last Rate: Stopped (04/12/17 1845)   dextrose 50 mL/hr Last " Rate: 50 mL/hr (04/11/17 1139)   niCARdipine 5-15 mg/hr Last Rate: Stopped (04/09/17 1000)   sodium chloride 9 mL/hr Last Rate: 9 mL/hr (04/10/17 2217)       Assessment/Plan  1. Respiratory failure what appears to be pneumonia with sepsis. Now off ventilator appears stable per pulmonary  2. History WPW status post ablation in the distant past no current arrhythmia EKG shows no evidence of preexcitation.  3. Hypothyroidism.  4. Mild aortic insufficiency mild mitral insufficiency.  5. Equivocal troponin. No acute event.  6. SVT. No recurrence off B blocker.  Will try to restart low-dose beta blocker.  7.  Hypertension as above we'll add hydralazine.    Trace Saleh MD  04/13/17  7:08 AM           Electronically signed by Trace Saleh MD at 4/13/2017  7:24 AM      Gerard Xie MD at 4/13/2017  8:09 AM  Version 1 of 1           Dr. ADA Xie    [facility]    4/13/2017    Patient ID:  Name:  Lupe Becker  MRN:  [id number]  1950  66 y.o.  female            CC/Reason for visit: Follow-up for acute on chronic respiratory failure    HPI: She was extubated yesterday.  She is feeling little bit better today, but still rapid shallow breathing.  She does not like bronchodilator nebulized treatments because they cause tachycardia and tremors.  She wants to stop these    Vitals:  Vitals:    04/13/17 0759 04/13/17 0851 04/13/17 0859 04/13/17 0959   BP: 171/81  143/69 147/83   BP Location:       Patient Position:       Pulse: 68 61 55 58   Resp:       Temp:       TempSrc:       SpO2: 96%  94% 90%   Weight:       Height:         FiO2 (%): 50 %     Body mass index is 33.84 kg/(m^2).    Intake/Output Summary (Last 24 hours) at 04/13/17 1010  Last data filed at 04/13/17 0434   Gross per 24 hour   Intake             1295 ml   Output             3300 ml   Net            -2005 ml       Exam:  GEN:  No distress, appears stated age  Has pectus excavatum and very webbed and short  neck  LUNGS: Distant and coarse breath sounds, but no wheezing, nonlabored breathing  CV:  Normal S1S2, without murmur, no edema  ABD:  Non tender, no enlarged liver or masses  EXT:  No cyanosis or clubbing    Scheduled meds:    amLODIPine 10 mg Oral Q24H   bisoprolol-hydrochlorothiazide 2 tablet Oral Q24H   enoxaparin 40 mg Subcutaneous Nightly   hydrALAZINE 25 mg Oral Q12H   lansoprazole 30 mg Nasogastric QAM   metoprolol tartrate 12.5 mg Oral Q12H   [START ON 4/14/2017] predniSONE 10 mg Oral Daily     IV meds:                        dextrose 50 mL/hr Last Rate: 50 mL/hr (04/11/17 1139)   niCARdipine 5-15 mg/hr Last Rate: Stopped (04/09/17 1000)   sodium chloride 9 mL/hr Last Rate: 9 mL/hr (04/10/17 9423)       Data Review:   I reviewed the patient's medications and new clinical results.  Lab Results   Component Value Date    CALCIUM 8.7 04/13/2017    PHOS 3.8 04/11/2017     Results from last 7 days  Lab Units 04/13/17  0435 04/12/17  0524 04/11/17  0515   AST (SGOT) U/L 33* 23 15   SODIUM mmol/L 140 137 146*   POTASSIUM mmol/L 3.0* 3.7 3.8   CHLORIDE mmol/L 95* 95* 100   TOTAL CO2 mmol/L 33.5* 30.5* 34.7*   BUN mg/dL 14 23 35*   CREATININE mg/dL 0.55* 0.52* 0.64   GLUCOSE mg/dL 124* 163* 162*   CALCIUM mg/dL 8.7 8.6 9.0   WBC 10*3/mm3 16.21* 16.72* 16.71*   HEMOGLOBIN g/dL 11.6* 10.9* 10.6*   PLATELETS 10*3/mm3 311 280 309   ALT (SGPT) U/L 45* 26 23             ASSESSMENT:   Acute on chronic hypoxic and hypercapnic respiratory failure  Hypernatremia  Community acquired pneumonia  Left adrenal mass  Elevated liver enzymes  Sepsis  Paroxysmal SVT (supraventricular tachycardia)  Scoliosis    PLAN:  The patient does not want to be reintubated if her respiratory status worsens.  We will increase her mobility with physical therapy.    Stop bronchodilators due to SVT.    She has completed antibiotics.  There is no microbiologic evidence of any infection.    Transfer out of CCU today if okay with all consultants    Gerard  JW Xie MD  4/13/2017     Electronically signed by Gerard Xie MD at 4/13/2017 10:12 AM

## 2017-04-14 ENCOUNTER — APPOINTMENT (OUTPATIENT)
Dept: GENERAL RADIOLOGY | Facility: HOSPITAL | Age: 67
End: 2017-04-14
Attending: INTERNAL MEDICINE

## 2017-04-14 ENCOUNTER — APPOINTMENT (OUTPATIENT)
Dept: GENERAL RADIOLOGY | Facility: HOSPITAL | Age: 67
End: 2017-04-14

## 2017-04-14 LAB
ALBUMIN SERPL-MCNC: 3.2 G/DL (ref 3.5–5.2)
ALBUMIN/GLOB SERPL: 1 G/DL
ALP SERPL-CCNC: 79 U/L (ref 39–117)
ALT SERPL W P-5'-P-CCNC: 46 U/L (ref 1–33)
ANION GAP SERPL CALCULATED.3IONS-SCNC: 9.3 MMOL/L
AST SERPL-CCNC: 30 U/L (ref 1–32)
BASOPHILS # BLD AUTO: 0 10*3/MM3 (ref 0–0.2)
BASOPHILS NFR BLD AUTO: 0 % (ref 0–1.5)
BILIRUB SERPL-MCNC: 0.6 MG/DL (ref 0.1–1.2)
BUN BLD-MCNC: 10 MG/DL (ref 8–23)
BUN/CREAT SERPL: 19.2 (ref 7–25)
CALCIUM SPEC-SCNC: 8.6 MG/DL (ref 8.6–10.5)
CHLORIDE SERPL-SCNC: 94 MMOL/L (ref 98–107)
CO2 SERPL-SCNC: 38.7 MMOL/L (ref 22–29)
CREAT BLD-MCNC: 0.52 MG/DL (ref 0.57–1)
DEPRECATED RDW RBC AUTO: 45.9 FL (ref 37–54)
EOSINOPHIL # BLD AUTO: 0.13 10*3/MM3 (ref 0–0.7)
EOSINOPHIL NFR BLD AUTO: 1.3 % (ref 0.3–6.2)
ERYTHROCYTE [DISTWIDTH] IN BLOOD BY AUTOMATED COUNT: 14.7 % (ref 11.7–13)
GFR SERPL CREATININE-BSD FRML MDRD: 118 ML/MIN/1.73
GLOBULIN UR ELPH-MCNC: 3.3 GM/DL
GLUCOSE BLD-MCNC: 101 MG/DL (ref 65–99)
GLUCOSE BLDC GLUCOMTR-MCNC: 98 MG/DL (ref 70–130)
HCT VFR BLD AUTO: 35.5 % (ref 35.6–45.5)
HGB BLD-MCNC: 10.7 G/DL (ref 11.9–15.5)
IMM GRANULOCYTES # BLD: 0.03 10*3/MM3 (ref 0–0.03)
IMM GRANULOCYTES NFR BLD: 0.3 % (ref 0–0.5)
LYMPHOCYTES # BLD AUTO: 0.76 10*3/MM3 (ref 0.9–4.8)
LYMPHOCYTES NFR BLD AUTO: 7.4 % (ref 19.6–45.3)
MCH RBC QN AUTO: 26.4 PG (ref 26.9–32)
MCHC RBC AUTO-ENTMCNC: 30.1 G/DL (ref 32.4–36.3)
MCV RBC AUTO: 87.4 FL (ref 80.5–98.2)
MONOCYTES # BLD AUTO: 1.06 10*3/MM3 (ref 0.2–1.2)
MONOCYTES NFR BLD AUTO: 10.3 % (ref 5–12)
NEUTROPHILS # BLD AUTO: 8.36 10*3/MM3 (ref 1.9–8.1)
NEUTROPHILS NFR BLD AUTO: 80.7 % (ref 42.7–76)
PLATELET # BLD AUTO: 242 10*3/MM3 (ref 140–500)
PMV BLD AUTO: 11.4 FL (ref 6–12)
POTASSIUM BLD-SCNC: 2.6 MMOL/L (ref 3.5–5.2)
POTASSIUM BLD-SCNC: 3.4 MMOL/L (ref 3.5–5.2)
PROT SERPL-MCNC: 6.5 G/DL (ref 6–8.5)
RBC # BLD AUTO: 4.06 10*6/MM3 (ref 3.9–5.2)
SODIUM BLD-SCNC: 142 MMOL/L (ref 136–145)
WBC NRBC COR # BLD: 10.34 10*3/MM3 (ref 4.5–10.7)

## 2017-04-14 PROCEDURE — 25010000002 ENOXAPARIN PER 10 MG: Performed by: INTERNAL MEDICINE

## 2017-04-14 PROCEDURE — 25010000002 ONDANSETRON PER 1 MG: Performed by: INTERNAL MEDICINE

## 2017-04-14 PROCEDURE — 97110 THERAPEUTIC EXERCISES: CPT

## 2017-04-14 PROCEDURE — 84132 ASSAY OF SERUM POTASSIUM: CPT | Performed by: INTERNAL MEDICINE

## 2017-04-14 PROCEDURE — 85025 COMPLETE CBC W/AUTO DIFF WBC: CPT | Performed by: INTERNAL MEDICINE

## 2017-04-14 PROCEDURE — 63710000001 PREDNISONE PER 5 MG: Performed by: INTERNAL MEDICINE

## 2017-04-14 PROCEDURE — 25010000003 POTASSIUM CHLORIDE PER 2 MEQ: Performed by: INTERNAL MEDICINE

## 2017-04-14 PROCEDURE — 71020 HC CHEST PA AND LATERAL: CPT

## 2017-04-14 PROCEDURE — 99232 SBSQ HOSP IP/OBS MODERATE 35: CPT | Performed by: INTERNAL MEDICINE

## 2017-04-14 PROCEDURE — 74000 HC ABDOMEN KUB: CPT

## 2017-04-14 PROCEDURE — 92611 MOTION FLUOROSCOPY/SWALLOW: CPT

## 2017-04-14 PROCEDURE — 82962 GLUCOSE BLOOD TEST: CPT

## 2017-04-14 PROCEDURE — 80053 COMPREHEN METABOLIC PANEL: CPT | Performed by: INTERNAL MEDICINE

## 2017-04-14 PROCEDURE — 74230 X-RAY XM SWLNG FUNCJ C+: CPT

## 2017-04-14 RX ORDER — POTASSIUM CHLORIDE 1.5 G/1.77G
40 POWDER, FOR SOLUTION ORAL AS NEEDED
Status: DISCONTINUED | OUTPATIENT
Start: 2017-04-14 | End: 2017-04-14 | Stop reason: CLARIF

## 2017-04-14 RX ORDER — POTASSIUM CHLORIDE 7.45 MG/ML
10 INJECTION INTRAVENOUS
Status: DISCONTINUED | OUTPATIENT
Start: 2017-04-14 | End: 2017-04-14 | Stop reason: SDUPTHER

## 2017-04-14 RX ORDER — ONDANSETRON 2 MG/ML
4 INJECTION INTRAMUSCULAR; INTRAVENOUS EVERY 4 HOURS PRN
Status: DISCONTINUED | OUTPATIENT
Start: 2017-04-14 | End: 2017-04-18 | Stop reason: HOSPADM

## 2017-04-14 RX ORDER — POTASSIUM CHLORIDE 1.5 G/1.77G
40 POWDER, FOR SOLUTION ORAL AS NEEDED
Status: DISCONTINUED | OUTPATIENT
Start: 2017-04-14 | End: 2017-04-18 | Stop reason: HOSPADM

## 2017-04-14 RX ORDER — POTASSIUM CHLORIDE 7.45 MG/ML
10 INJECTION INTRAVENOUS
Status: DISCONTINUED | OUTPATIENT
Start: 2017-04-14 | End: 2017-04-18 | Stop reason: HOSPADM

## 2017-04-14 RX ORDER — POTASSIUM CHLORIDE 750 MG/1
40 CAPSULE, EXTENDED RELEASE ORAL AS NEEDED
Status: DISCONTINUED | OUTPATIENT
Start: 2017-04-14 | End: 2017-04-18 | Stop reason: HOSPADM

## 2017-04-14 RX ORDER — POTASSIUM CHLORIDE 750 MG/1
40 CAPSULE, EXTENDED RELEASE ORAL AS NEEDED
Status: DISCONTINUED | OUTPATIENT
Start: 2017-04-14 | End: 2017-04-14 | Stop reason: SDUPTHER

## 2017-04-14 RX ORDER — MORPHINE SULFATE 2 MG/ML
3 INJECTION, SOLUTION INTRAMUSCULAR; INTRAVENOUS
Status: DISCONTINUED | OUTPATIENT
Start: 2017-04-14 | End: 2017-04-18 | Stop reason: HOSPADM

## 2017-04-14 RX ADMIN — PREDNISONE 10 MG: 10 TABLET ORAL at 08:51

## 2017-04-14 RX ADMIN — POTASSIUM CHLORIDE 40 MEQ: 750 CAPSULE, EXTENDED RELEASE ORAL at 22:06

## 2017-04-14 RX ADMIN — POTASSIUM CHLORIDE 20 MEQ: 400 INJECTION, SOLUTION INTRAVENOUS at 07:50

## 2017-04-14 RX ADMIN — ENOXAPARIN SODIUM 40 MG: 40 INJECTION SUBCUTANEOUS at 20:56

## 2017-04-14 RX ADMIN — POTASSIUM CHLORIDE 20 MEQ: 400 INJECTION, SOLUTION INTRAVENOUS at 08:52

## 2017-04-14 RX ADMIN — AMLODIPINE BESYLATE 10 MG: 10 TABLET ORAL at 08:52

## 2017-04-14 RX ADMIN — POTASSIUM CHLORIDE 20 MEQ: 400 INJECTION, SOLUTION INTRAVENOUS at 10:04

## 2017-04-14 RX ADMIN — BISOPROLOL FUMARATE AND HYDROCHLOROTHIAZIDE 2 TABLET: 6.25; 2.5 TABLET ORAL at 08:51

## 2017-04-14 RX ADMIN — HYDRALAZINE HYDROCHLORIDE 25 MG: 25 TABLET ORAL at 08:51

## 2017-04-14 RX ADMIN — HYDRALAZINE HYDROCHLORIDE 25 MG: 25 TABLET ORAL at 23:15

## 2017-04-14 RX ADMIN — ONDANSETRON 4 MG: 2 INJECTION INTRAMUSCULAR; INTRAVENOUS at 03:08

## 2017-04-14 NOTE — PLAN OF CARE
Problem: Patient Care Overview (Adult)  Goal: Plan of Care Review    04/14/17 1440   Coping/Psychosocial Response Interventions   Plan Of Care Reviewed With patient   Patient Care Overview   Progress improving   Outcome Evaluation   Outcome Summary/Follow up Plan VFSS; no penetration or aspiration; regular and thin recommended         Problem: Inpatient SLP  Goal: Dysphagia- Patient will improve swallowing skills to begin to take some PO safely  Outcome: Outcome(s) achieved Date Met:  04/14/17 04/14/17 1440   Begin to Take Some PO Safely   Begin to Take Some PO Safely- SLP, Date Established 04/14/17   Begin to Take Some PO Safely- SLP, Additional Goal regular and thin recommended

## 2017-04-14 NOTE — PLAN OF CARE
Problem: Patient Care Overview (Adult)  Goal: Plan of Care Review  Outcome: Ongoing (interventions implemented as appropriate)    04/14/17 9497   Coping/Psychosocial Response Interventions   Plan Of Care Reviewed With patient   Patient Care Overview   Progress progress toward functional goals as expected   Outcome Evaluation   Outcome Summary/Follow up Plan Pt improved with her mobility today, able to ambulate 20' with walker & CGA/Min A;

## 2017-04-14 NOTE — PROGRESS NOTES
Acute Care - Physical Therapy Treatment Note  [facility]     Patient Name: Lupe Becker  : 1950  MRN: [id number]  Today's Date: 2017  Onset of Illness/Injury or Date of Surgery Date: 17  Date of Referral to PT: 17  Referring Physician: Anne-Marie    Admit Date: 3/31/2017    Visit Dx:    ICD-10-CM ICD-9-CM   1. Sepsis, due to unspecified organism A41.9 038.9     995.91   2. Acute respiratory failure with hypoxia and hypercapnia J96.01 518.81    J96.02    3. Pneumonia of right lower lobe due to infectious organism J18.9 483.8   4. Leukocytosis, unspecified type D72.829 288.60   5. Generalized weakness R53.1 780.79     Patient Active Problem List   Diagnosis   • Accelerated essential hypertension   • Hypoxia   • Cervical fusion syndrome   • Abnormal presence of protein in urine   • Fast heart beat   • Ventricular pre-excitation with arrhythmia   • Adult onset hypothyroidism   • Anxiety about health   • Sepsis   • Paroxysmal SVT (supraventricular tachycardia)               Adult Rehabilitation Note       17 1603          Rehab Assessment/Intervention    Discipline physical therapist  -DM      Document Type therapy note (daily note)  -DM      Subjective Information no complaints;agree to therapy  -DM      Patient Effort, Rehab Treatment good  -DM      Symptoms Noted During/After Treatment none  -DM      Precautions/Limitations fall precautions  -DM      Recorded by [DM] Rocío Montero, PT      Vital Signs    Pre SpO2 (%) 93  -DM      O2 Delivery Pre Treatment supplemental O2  -DM      Pre Patient Position Sitting  -DM      Recorded by [DM] Rocío Montero, PT      Pain Assessment    Pain Assessment No/denies pain  -DM      Recorded by [DM] Rocío Montero, PT      Vision Assessment/Intervention    Visual Impairment WFL  -DM      Recorded by [DM] Rocío Montero, PT      Cognitive Assessment/Intervention    Current Cognitive/Communication Assessment functional  -DM      Orientation Status oriented  x 4  -DM      Follows Commands/Answers Questions 100% of the time  -DM      Personal Safety Interventions fall prevention program maintained  -DM      Recorded by [DM] Rocío Montero, PT      Bed Mobility, Assessment/Treatment    Bed Mob, Supine to Sit, [place] not tested  -DM      Bed Mob, Sit to Supine, [place] not tested  -DM      Bed Mobility, Comment sitting at EOB  -DM      Recorded by [DM] Rocío Montero, PT      Transfer Assessment/Treatment    Transfers, Sit-Stand [place] minimum assist (75% patient effort);1 person + 1 person to manage equipment;verbal cues required  -DM      Transfers, Stand-Sit [place] minimum assist (75% patient effort);verbal cues required;1 person + 1 person to manage equipment  -DM      Transfers, Sit-Stand-Sit, Assist Device standard walker  -DM      Transfer, Safety Issues balance decreased during turns;step length decreased  -DM      Transfer, Impairments strength decreased;impaired balance  -DM      Transfer, Comment cues for hand placement  -DM      Recorded by [DM] Rocío Montero, PT      Gait Assessment/Treatment    Gait, [place] Level contact guard assist;verbal cues required;1 person + 1 person to manage equipment  -DM      Gait, Assistive Device standard walker  -DM      Gait, Distance (Feet) 20  -DM      Gait, Gait Pattern Analysis 3-point gait  -DM      Gait, Gait Deviations [place] decreased;decreased heel strike;double stance time increased;step length decreased;toe-to-floor clearance decreased  -DM      Gait, Safety Issues balance decreased during turns;step length decreased;supplemental O2  -DM      Gait, Impairments strength decreased;impaired balance  -DM      Gait, Comment try RW next visit  -DM      Recorded by [DM] Rocío Montero, PT      Motor Skills/Interventions    Additional Documentation Balance Skills Training (Group)  -DM      Recorded by [DM] Rocío Montero, PT      Balance Skills Training    Sitting-Level of Assistance Distant  supervision  -DM      Standing-Level of Assistance Contact guard  -DM      Static Standing Balance Support assistive device  -DM      Gait Balance-Level of Assistance Contact guard  -DM      Gait Balance Support assistive device  -DM      Recorded by [DM] Rocío Montero, PT      Therapy Exercises    Bilateral Lower Extremities AROM:;10 reps;sitting;ankle pumps/circles;LAQ  -DM      Recorded by [DM] Rocío Montero, PT      Positioning and Restraints    Pre-Treatment Position in bed  -DM      Post Treatment Position bed  -DM      In Bed notified nsg;sitting EOB;call light within reach;encouraged to call for assist;exit alarm on;with family/caregiver  -DM      Recorded by [DM] Rocío Montero, PT        User Key  (r) = Recorded By, (t) = Taken By, (c) = Cosigned By    Initials Name Effective Dates    DELPHINE Montero, PT 10/06/15 -                 IP PT Goals       04/13/17 1218 04/08/17 1138 04/04/17 1339    Bed Mobility PT LTG    Bed Mobility PT LTG, Date Established 04/13/17  -PC  04/04/17  -DM (r) CH (t) DM (c)    Bed Mobility PT LTG, Time to Achieve 1 wk  -PC  1 wk  -DM (r) CH (t) DM (c)    Bed Mobility PT LTG, Activity Type supine to sit/sit to supine  -PC  all bed mobility  -DM (r) CH (t) DM (c)    Bed Mobility PT LTG, [place] Level minimum assist (75% patient effort)  -PC  moderate assist (50% patient effort);2 person assist required  -DM (r) CH (t) DM (c)    Bed Mobility PT LTG, Date Goal Reviewed  04/08/17  -LH     Bed Mobility PT LTG, Outcome  goal no longer appropriate  -     Transfer Training PT LTG    Transfer Training PT LTG, Date Established 04/13/17  -PC      Transfer Training PT LTG, Time to Achieve 1 wk  -PC      Transfer Training PT LTG, Activity Type sit to stand/stand to sit  -PC      Transfer Training PT LTG, [place] Level minimum assist (75% patient effort)  -PC      Gait Training PT LTG    Gait Training Goal PT LTG, Date Established 04/13/17  -PC      Gait Training Goal PT LTG, Time to  Achieve 1 wk  -PC      Gait Training Goal PT LTG, [place] Level minimum assist (75% patient effort);2 person assist required  -PC      Gait Training Goal PT LTG, Assist Device walker, rolling  -PC      Gait Training Goal PT LTG, Distance to Achieve 15 ft  -PC      Static Sitting Balance PT LTG    Static Sitting Balance PT LTG, Date Established   04/04/17  -DM (r) CH (t) DM (c)    Static Sitting Balance PT LTG, Time to Achieve   1 wk  -DM (r) CH (t) DM (c)    Static Sitting Balance PT LTG, [place] Level   supervision required  -DM (r) CH (t) DM (c)    Static Sitting Balance PT LTG, Date Goal Reviewed  04/08/17  -LH     Static Sitting Balance PT LTG, Outcome  goal no longer appropriate  -       User Key  (r) = Recorded By, (t) = Taken By, (c) = Cosigned By    Initials Name Provider Type    DM Rocío Montero, PT Physical Therapist    LH Idalia Remy, PT Physical Therapist    PC Evonne Lau, PT Physical Therapist     Celso Torres, PT Student PT Student          Physical Therapy Education     Title: PT OT SLP Therapies (Done)     Topic: Physical Therapy (Done)     Point: Mobility training (Done)    Learning Progress Summary    Learner Readiness Method Response Comment Documented by Status   Patient Eager E,TB,D NR,VU  DM 04/14/17 1618 Done    Acceptance E,D NR  PC 04/13/17 1217 Active    Acceptance E VU,NR  MODE 04/07/17 1636 Done    Acceptance E VU  MB 04/06/17 1745 Done    Acceptance E NR   04/04/17 1338 Active   Family Acceptance E VU  MB 04/06/17 1745 Done               Point: Home exercise program (Done)    Learning Progress Summary    Learner Readiness Method Response Comment Documented by Status   Patient Eager E,TB,D NR,VU  DM 04/14/17 1618 Done    Acceptance E,D NR  PC 04/13/17 1217 Active               Point: Body mechanics (Done)    Learning Progress Summary    Learner Readiness Method Response Comment Documented by Status   Patient Eager E,TB,D NR,VU  DM 04/14/17 1618 Done    Acceptance E,D  NR  PC 04/13/17 1217 Active    Acceptance E VU,NR  MODE 04/07/17 1636 Done               Point: Precautions (Done)    Learning Progress Summary    Learner Readiness Method Response Comment Documented by Status   Patient Eager E,TB,D NR,VU  DM 04/14/17 1618 Done    Acceptance E,D NR  PC 04/13/17 1217 Active    Acceptance E VU,NR  MODE 04/07/17 1636 Done                      User Key     Initials Effective Dates Name Provider Type Discipline    DM 10/06/15 -  Rocío Montero, PT Physical Therapist PT    PC 12/01/15 -  Evonne Lau, PT Physical Therapist PT    MODE 04/24/15 -  Miguelito Neri PTA Physical Therapy Assistant PT    CH 01/31/17 -  Celso Torres, PT Student PT Student PT    MB 03/06/17 -  Alexia Carias, RN Registered Nurse Nurse                    PT Recommendation and Plan  Anticipated Discharge Disposition: inpatient rehabilitation facility, skilled nursing facility  Planned Therapy Interventions: balance training, bed mobility training, gait training, home exercise program, strengthening, transfer training  PT Frequency: daily  Plan of Care Review  Plan Of Care Reviewed With: patient  Progress: progress toward functional goals as expected  Outcome Summary/Follow up Plan: Pt improved with her mobility today, able to ambulate 20' with walker & CGA/Min A;           Outcome Measures       04/14/17 1600 04/13/17 1200       How much help from another person do you currently need...    Turning from your back to your side while in flat bed without using bedrails? 2  -DM 2  -PC     Moving from lying on back to sitting on the side of a flat bed without bedrails? 2  -DM 2  -PC     Moving to and from a bed to a chair (including a wheelchair)? 3  -DM 2  -PC     Standing up from a chair using your arms (e.g., wheelchair, bedside chair)? 3  -DM 2  -PC     Climbing 3-5 steps with a railing? 1  -DM 1  -PC     To walk in hospital room? 3  -DM 2  -PC     AM-PAC 6 Clicks Score 14  -DM 11  -PC     Functional Assessment    Outcome  Measure Options AM-PAC 6 Clicks Basic Mobility (PT)  -DM        User Key  (r) = Recorded By, (t) = Taken By, (c) = Cosigned By    Initials Name Provider Type    DELPHINE Montero, PT Physical Therapist    PC Evonne Lau PT Physical Therapist           Time Calculation:         PT Charges       04/14/17 1620          Time Calculation    Start Time 1603  -DM      Stop Time 1615  -DM      Time Calculation (min) 12 min  -DM      PT Received On 04/14/17  -DM      PT - Next Appointment 04/15/17  -DM        User Key  (r) = Recorded By, (t) = Taken By, (c) = Cosigned By    Initials Name Provider Type    DELPHINE Montero PT Physical Therapist          Therapy Charges for Today     Code Description Service Date Service Provider Modifiers Qty    35318143846 HC PT THER SUPP EA 15 MIN 4/14/2017 Rocío Montero, PT GP 1    51533771370 HC PT THER PROC EA 15 MIN 4/14/2017 Rocío Montero, PT GP 1          PT G-Codes  Outcome Measure Options: AM-PAC 6 Clicks Basic Mobility (PT)    Rocío Montero, PT  4/14/2017

## 2017-04-14 NOTE — NURSING NOTE
Initial eval. Started, chart reviewed and therapies noted. Will monitor for progress with therapies for determination of most appropriate rehab level.    Berna Cortes RN  Rehab Admissions Nurse  [phone]

## 2017-04-14 NOTE — PLAN OF CARE
Problem: Patient Care Overview (Adult)  Goal: Plan of Care Review  Outcome: Ongoing (interventions implemented as appropriate)    04/14/17 0575   Coping/Psychosocial Response Interventions   Plan Of Care Reviewed With patient   Patient Care Overview   Progress improving   Outcome Evaluation   Outcome Summary/Follow up Plan Pt transferred to floor from CCU. Chest XRAY performed. Video swallow completed this afternoon. Pt started on a regular, cardiac diet. Worked with PT. Continued on 3L O2 per nasal cannula. Fall precautions remain in place. VSS, no acute signs of distress noted at this time       Goal: Adult Individualization and Mutuality  Outcome: Ongoing (interventions implemented as appropriate)  Goal: Discharge Needs Assessment  Outcome: Ongoing (interventions implemented as appropriate)    Problem: Respiratory Insufficiency (Adult)  Goal: Acid/Base Balance  Outcome: Ongoing (interventions implemented as appropriate)  Goal: Effective Ventilation  Outcome: Ongoing (interventions implemented as appropriate)    Problem: Fall Risk (Adult)  Goal: Absence of Falls  Outcome: Ongoing (interventions implemented as appropriate)    Problem: Skin Integrity Impairment, Risk/Actual (Adult)  Goal: Skin Integrity/Wound Healing  Outcome: Ongoing (interventions implemented as appropriate)

## 2017-04-14 NOTE — PROGRESS NOTES
Dr. ADA Xie    [facility] CORONARY CARE    4/14/2017    Patient ID:  Name:  Lupe Becker  MRN:  [id number]  1950  66 y.o.  female            CC/Reason for visit: Follow-up for acute on chronic respiratory failure    HPI: Doing very well.  She has been off the ventilator for several days now.    Vitals:  Vitals:    04/14/17 0505 04/14/17 0600 04/14/17 0700 04/14/17 0727   BP:  140/63 155/66    BP Location:       Patient Position:       Pulse:  61 72    Resp:       Temp: 98.6 °F (37 °C)   98.3 °F (36.8 °C)   TempSrc: Oral   Oral   SpO2:  96% 94%    Weight:       Height:         FiO2 (%): 50 %     Body mass index is 33.84 kg/(m^2).    Intake/Output Summary (Last 24 hours) at 04/14/17 0941  Last data filed at 04/14/17 0529   Gross per 24 hour   Intake          1378.95 ml   Output             3200 ml   Net         -1821.05 ml       Exam:  GEN:  No distress, awake, alert, very pleasant.  Has pectus excavatum and very webbed and short neck  LUNGS: Distant and coarse breath sounds, but no wheezing, nonlabored breathing  CV:  Normal S1S2, without murmur, no edema  ABD:  Non tender, no enlarged liver or masses  EXT:  No cyanosis or clubbing    Scheduled meds:    amLODIPine 10 mg Oral Q24H   bisoprolol-hydrochlorothiazide 2 tablet Oral Q24H   enoxaparin 40 mg Subcutaneous Nightly   hydrALAZINE 25 mg Oral Q12H   predniSONE 10 mg Oral Daily     IV meds:                        dextrose 50 mL/hr Last Rate: 50 mL/hr (04/11/17 1139)   sodium chloride 9 mL/hr Last Rate: 9 mL/hr (04/10/17 7095)       Data Review:   I reviewed the patient's medications and new clinical results.  Lab Results   Component Value Date    CALCIUM 8.6 04/14/2017    PHOS 3.8 04/11/2017     Results from last 7 days  Lab Units 04/14/17  0314 04/13/17  0435 04/12/17  0524   AST (SGOT) U/L 30 33* 23   SODIUM mmol/L 142 140 137   POTASSIUM mmol/L 2.6* 3.0* 3.7   CHLORIDE mmol/L 94* 95* 95*   TOTAL CO2 mmol/L 38.7* 33.5* 30.5*   BUN mg/dL 10  14 23   CREATININE mg/dL 0.52* 0.55* 0.52*   GLUCOSE mg/dL 101* 124* 163*   CALCIUM mg/dL 8.6 8.7 8.6   WBC 10*3/mm3 10.34 16.21* 16.72*   HEMOGLOBIN g/dL 10.7* 11.6* 10.9*   PLATELETS 10*3/mm3 242 311 280   ALT (SGPT) U/L 46* 45* 26           Results from last 7 days  Lab Units 04/12/17  0931   PH, ARTERIAL pH units 7.441   PO2 ART mm Hg 92.4   PCO2, ARTERIAL mm Hg 48.3*   HCO3 ART mmol/L 32.9*       ASSESSMENT:   Acute on chronic hypoxic and hypercapnic respiratory failure  Hypernatremia  Community acquired pneumonia  Left adrenal mass  Elevated liver enzymes  Sepsis  Paroxysmal SVT (supraventricular tachycardia)  Scoliosis  Hypertension    PLAN:  Continue advancing patient's mobility with physical therapy.    Her hypertension is also responding well to her new medications    Perform swallow study today so we can advance oral diet.    She has done very well.    Gerard Xie MD  4/14/2017

## 2017-04-14 NOTE — PROGRESS NOTES
"CC: PSVT    Interval History:   No complaints. Did state she does not want to be on a ventilater.    Vital Signs  Temp:  [97.6 °F (36.4 °C)-98.7 °F (37.1 °C)] 98.6 °F (37 °C)  Heart Rate:  [55-75] 72  BP: (138-173)/(61-92) 155/66    Intake/Output Summary (Last 24 hours) at 04/14/17 0719  Last data filed at 04/14/17 0529   Gross per 24 hour   Intake          1498.95 ml   Output             3200 ml   Net         -1701.05 ml     Flowsheet Rows         First Filed Value    Admission Height  59\" (149.9 cm) Documented at 03/31/2017 1210    Admission Weight  165 lb (74.8 kg) Documented at 03/31/2017 1210          PHYSICAL EXAM:  General: No acute distress  Resp:NL Rate, unlabored, Diminished in the bases  CV:NL rate and rhythm, NL PMI, Nl S1 and S2, no Mumur, no gallop, no rub, No JVD. Normal pedal pulses  ABD:Nl sounds, no masses or tenderness, nondistended, no quarding or rebound  Neuro: Awake , alert and oriented  Extr: No edema or cyanosis, moves all extremities      Results Review:      Results from last 7 days  Lab Units 04/14/17  0314   SODIUM mmol/L 142   POTASSIUM mmol/L 2.6*   CHLORIDE mmol/L 94*   TOTAL CO2 mmol/L 38.7*   BUN mg/dL 10   CREATININE mg/dL 0.52*   GLUCOSE mg/dL 101*   CALCIUM mg/dL 8.6           Results from last 7 days  Lab Units 04/14/17  0314   WBC 10*3/mm3 10.34   HEMOGLOBIN g/dL 10.7*   HEMATOCRIT % 35.5*   PLATELETS 10*3/mm3 242       Results from last 7 days  Lab Units 04/11/17  0515   INR  1.07   APTT seconds 24.5                 @LABRCNT(bnp)@  I reviewed the patient's new clinical results.  I personally viewed and interpreted the patient's EKG/Telemetry data        Medication Review:   Meds reviewed      dextrose 50 mL/hr Last Rate: 50 mL/hr (04/11/17 113)   sodium chloride 9 mL/hr Last Rate: 9 mL/hr (04/10/17 8131)       Assessment/Plan  1. Respiratory failure what appears to be pneumonia with sepsis. Now off ventilator appears stable per pulmonary. Waiting for monitor bed.  2. 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  3. Hypothyroidism.  4. Mild aortic insufficiency mild mitral insufficiency.  5. Equivocal troponin. No acute event.  6. SVT. No recurrence off B blocker. On low-dose beta blocker.We will see prn.  7. Hypertension as above we'll add hydralazine.    Trace Saleh MD  04/14/17  7:19 AM

## 2017-04-15 LAB
ALBUMIN SERPL-MCNC: 3.1 G/DL (ref 3.5–5.2)
ALBUMIN/GLOB SERPL: 0.9 G/DL
ALP SERPL-CCNC: 80 U/L (ref 39–117)
ALT SERPL W P-5'-P-CCNC: 40 U/L (ref 1–33)
ANION GAP SERPL CALCULATED.3IONS-SCNC: 7.4 MMOL/L
AST SERPL-CCNC: 21 U/L (ref 1–32)
BASOPHILS # BLD AUTO: 0 10*3/MM3 (ref 0–0.2)
BASOPHILS NFR BLD AUTO: 0 % (ref 0–1.5)
BILIRUB SERPL-MCNC: 0.4 MG/DL (ref 0.1–1.2)
BUN BLD-MCNC: 10 MG/DL (ref 8–23)
BUN/CREAT SERPL: 17.9 (ref 7–25)
CALCIUM SPEC-SCNC: 8.7 MG/DL (ref 8.6–10.5)
CHLORIDE SERPL-SCNC: 99 MMOL/L (ref 98–107)
CO2 SERPL-SCNC: 37.6 MMOL/L (ref 22–29)
CREAT BLD-MCNC: 0.56 MG/DL (ref 0.57–1)
DEPRECATED RDW RBC AUTO: 47.2 FL (ref 37–54)
EOSINOPHIL # BLD AUTO: 0.22 10*3/MM3 (ref 0–0.7)
EOSINOPHIL NFR BLD AUTO: 2.5 % (ref 0.3–6.2)
ERYTHROCYTE [DISTWIDTH] IN BLOOD BY AUTOMATED COUNT: 14.8 % (ref 11.7–13)
GFR SERPL CREATININE-BSD FRML MDRD: 109 ML/MIN/1.73
GLOBULIN UR ELPH-MCNC: 3.3 GM/DL
GLUCOSE BLD-MCNC: 102 MG/DL (ref 65–99)
HCT VFR BLD AUTO: 36 % (ref 35.6–45.5)
HGB BLD-MCNC: 10.7 G/DL (ref 11.9–15.5)
IMM GRANULOCYTES # BLD: 0.03 10*3/MM3 (ref 0–0.03)
IMM GRANULOCYTES NFR BLD: 0.3 % (ref 0–0.5)
LYMPHOCYTES # BLD AUTO: 1.54 10*3/MM3 (ref 0.9–4.8)
LYMPHOCYTES NFR BLD AUTO: 17.4 % (ref 19.6–45.3)
MAGNESIUM SERPL-MCNC: 1.9 MG/DL (ref 1.6–2.4)
MCH RBC QN AUTO: 26.4 PG (ref 26.9–32)
MCHC RBC AUTO-ENTMCNC: 29.7 G/DL (ref 32.4–36.3)
MCV RBC AUTO: 88.7 FL (ref 80.5–98.2)
MONOCYTES # BLD AUTO: 0.47 10*3/MM3 (ref 0.2–1.2)
MONOCYTES NFR BLD AUTO: 5.3 % (ref 5–12)
NEUTROPHILS # BLD AUTO: 6.6 10*3/MM3 (ref 1.9–8.1)
NEUTROPHILS NFR BLD AUTO: 74.5 % (ref 42.7–76)
PLATELET # BLD AUTO: 229 10*3/MM3 (ref 140–500)
PMV BLD AUTO: 11.5 FL (ref 6–12)
POTASSIUM BLD-SCNC: 4.1 MMOL/L (ref 3.5–5.2)
PROT SERPL-MCNC: 6.4 G/DL (ref 6–8.5)
RBC # BLD AUTO: 4.06 10*6/MM3 (ref 3.9–5.2)
SODIUM BLD-SCNC: 144 MMOL/L (ref 136–145)
WBC NRBC COR # BLD: 8.86 10*3/MM3 (ref 4.5–10.7)

## 2017-04-15 PROCEDURE — 97110 THERAPEUTIC EXERCISES: CPT

## 2017-04-15 PROCEDURE — 85025 COMPLETE CBC W/AUTO DIFF WBC: CPT | Performed by: INTERNAL MEDICINE

## 2017-04-15 PROCEDURE — 83735 ASSAY OF MAGNESIUM: CPT | Performed by: INTERNAL MEDICINE

## 2017-04-15 PROCEDURE — 80053 COMPREHEN METABOLIC PANEL: CPT | Performed by: INTERNAL MEDICINE

## 2017-04-15 PROCEDURE — 25010000002 ENOXAPARIN PER 10 MG: Performed by: INTERNAL MEDICINE

## 2017-04-15 RX ADMIN — POTASSIUM CHLORIDE 40 MEQ: 1.5 POWDER, FOR SOLUTION ORAL at 02:02

## 2017-04-15 RX ADMIN — AMLODIPINE BESYLATE 10 MG: 10 TABLET ORAL at 09:58

## 2017-04-15 RX ADMIN — HYDRALAZINE HYDROCHLORIDE 25 MG: 25 TABLET ORAL at 09:58

## 2017-04-15 RX ADMIN — BISOPROLOL FUMARATE AND HYDROCHLOROTHIAZIDE 2 TABLET: 6.25; 2.5 TABLET ORAL at 09:58

## 2017-04-15 RX ADMIN — HYDRALAZINE HYDROCHLORIDE 25 MG: 25 TABLET ORAL at 20:44

## 2017-04-15 RX ADMIN — ENOXAPARIN SODIUM 40 MG: 40 INJECTION SUBCUTANEOUS at 20:44

## 2017-04-15 NOTE — PLAN OF CARE
Problem: Patient Care Overview (Adult)  Goal: Plan of Care Review  Outcome: Ongoing (interventions implemented as appropriate)    04/15/17 0984   Coping/Psychosocial Response Interventions   Plan Of Care Reviewed With patient   Patient Care Overview   Progress improving   Outcome Evaluation   Outcome Summary/Follow up Plan pt up with physical therapy today. no signs or symptoms of ditress. pt denies pain. up with assist x1. currently resting in bed        Goal: Adult Individualization and Mutuality  Outcome: Ongoing (interventions implemented as appropriate)  Goal: Discharge Needs Assessment  Outcome: Ongoing (interventions implemented as appropriate)    Problem: Respiratory Insufficiency (Adult)  Goal: Acid/Base Balance  Outcome: Ongoing (interventions implemented as appropriate)  Goal: Effective Ventilation  Outcome: Ongoing (interventions implemented as appropriate)    Problem: Fall Risk (Adult)  Goal: Absence of Falls  Outcome: Ongoing (interventions implemented as appropriate)    Problem: Skin Integrity Impairment, Risk/Actual (Adult)  Goal: Skin Integrity/Wound Healing  Outcome: Ongoing (interventions implemented as appropriate)

## 2017-04-15 NOTE — PLAN OF CARE
Problem: Patient Care Overview (Adult)  Goal: Plan of Care Review    04/15/17 0551   Coping/Psychosocial Response Interventions   Plan Of Care Reviewed With patient   Outcome Evaluation   Outcome Summary/Follow up Plan Patient denies any pain. Still having shortness of breath with ambulation to the bathroon. On 3l of O2 per NC. Fall precautions enforced. Monitored.

## 2017-04-15 NOTE — PROGRESS NOTES
Acute Care - Physical Therapy Treatment Note  [facility]     Patient Name: Lupe Becker  : 1950  MRN: [id number]  Today's Date: 4/15/2017  Onset of Illness/Injury or Date of Surgery Date: 17  Date of Referral to PT: 17  Referring Physician: Anne-Marie    Admit Date: 3/31/2017    Visit Dx:    ICD-10-CM ICD-9-CM   1. Sepsis, due to unspecified organism A41.9 038.9     995.91   2. Acute respiratory failure with hypoxia and hypercapnia J96.01 518.81    J96.02    3. Pneumonia of right lower lobe due to infectious organism J18.9 483.8   4. Leukocytosis, unspecified type D72.829 288.60   5. Generalized weakness R53.1 780.79     Patient Active Problem List   Diagnosis   • Accelerated essential hypertension   • Hypoxia   • Cervical fusion syndrome   • Abnormal presence of protein in urine   • Fast heart beat   • Ventricular pre-excitation with arrhythmia   • Adult onset hypothyroidism   • Anxiety about health   • Sepsis   • Paroxysmal SVT (supraventricular tachycardia)               Adult Rehabilitation Note       04/15/17 0900 17 1603       Rehab Assessment/Intervention    Discipline physical therapy assistant  -CW physical therapist  -DM     Document Type therapy note (daily note)  -CW therapy note (daily note)  -DM     Subjective Information agree to therapy;complains of;weakness;fatigue  -CW no complaints;agree to therapy  -DM     Patient Effort, Rehab Treatment good  -CW good  -DM     Symptoms Noted During/After Treatment  none  -DM     Precautions/Limitations fall precautions;oxygen therapy device and L/min  -CW fall precautions  -DM     Recorded by [CW] Giuseppe Naqvi [DM] Rocío Montero, PT     Vital Signs    Pre SpO2 (%)  93  -DM     O2 Delivery Pre Treatment supplemental O2  -CW supplemental O2  -DM     Pre Patient Position  Sitting  -DM     Recorded by [CW] Giuseppe Naqvi [DM] Rocío Montero, PT     Pain Assessment    Pain Assessment No/denies pain  -CW No/denies pain  -DM      Recorded by [CW] Giuseppe Naqvi [DM] Rocío Montero, PT     Vision Assessment/Intervention    Visual Impairment  WFL  -DM     Recorded by  [DM] Rocío Montero PT     Cognitive Assessment/Intervention    Current Cognitive/Communication Assessment functional  -CW functional  -DM     Orientation Status oriented x 4  -CW oriented x 4  -DM     Follows Commands/Answers Questions 100% of the time  -% of the time  -DM     Personal Safety WNL/WFL  -CW      Personal Safety Interventions fall prevention program maintained;gait belt;nonskid shoes/slippers when out of bed  -CW fall prevention program maintained  -DM     Recorded by [CW] Giuseppe Naqvi [DM] Rocío Montero, PT     Bed Mobility, Assessment/Treatment    Bed Mob, Supine to Sit, [place]  not tested  -DM     Bed Mob, Sit to Supine, [place] not tested  -CW not tested  -DM     Bed Mobility, Comment sitting EOB  -CW sitting at EOB  -DM     Recorded by [CW] Giuseppe Naqvi [DM] Rocío Montero, PT     Transfer Assessment/Treatment    Transfers, Sit-Stand [place] contact guard assist  -CW minimum assist (75% patient effort);1 person + 1 person to manage equipment;verbal cues required  -DM     Transfers, Stand-Sit [place] contact guard assist  -CW minimum assist (75% patient effort);verbal cues required;1 person + 1 person to manage equipment  -DM     Transfers, Sit-Stand-Sit, Assist Device rolling walker  -CW standard walker  -DM     Transfer, Safety Issues  balance decreased during turns;step length decreased  -DM     Transfer, Impairments  strength decreased;impaired balance  -DM     Transfer, Comment  cues for hand placement  -DM     Recorded by [CW] Giuseppe Naqvi [DM] Rocío Montero, PT     Gait Assessment/Treatment    Gait, [place] Level contact guard assist  -CW contact guard assist;verbal cues required;1 person + 1 person to manage equipment  -DM     Gait, Assistive Device rolling walker  -CW standard walker  -DM     Gait,  Distance (Feet) 60  -CW 20  -DM     Gait, Gait Pattern Analysis 3-point gait  -CW 3-point gait  -DM     Gait, Gait Deviations [place] decreased;step length decreased;stride length decreased  -CW [place] decreased;decreased heel strike;double stance time increased;step length decreased;toe-to-floor clearance decreased  -DM     Gait, Safety Issues  balance decreased during turns;step length decreased;supplemental O2  -DM     Gait, Impairments  strength decreased;impaired balance  -DM     Gait, Comment  try RW next visit  -DM     Recorded by [CW] Giuseppe Naqvi [DM] Rocío Montero, PT     Motor Skills/Interventions    Additional Documentation  Balance Skills Training (Group)  -DM     Recorded by  [DM] Rocío Montero, PT     Balance Skills Training    Sitting-Level of Assistance  Distant supervision  -DM     Standing-Level of Assistance  Contact guard  -DM     Static Standing Balance Support  assistive device  -DM     Gait Balance-Level of Assistance  Contact guard  -DM     Gait Balance Support  assistive device  -DM     Recorded by  [DM] Rocío Montero, PT     Therapy Exercises    Bilateral Lower Extremities AROM:;10 reps;sitting;ankle pumps/circles;LAQ  -CW AROM:;10 reps;sitting;ankle pumps/circles;LAQ  -DM     Recorded by [CW] Giuseppe Naqvi [DM] Rocío Montero, PT     Positioning and Restraints    Pre-Treatment Position in bed  -CW in bed  -DM     Post Treatment Position bed  -CW bed  -DM     In Bed notified nsg;sitting EOB;call light within reach;encouraged to call for assist  -CW notified nsg;sitting EOB;call light within reach;encouraged to call for assist;exit alarm on;with family/caregiver  -DM     Recorded by [CW] Giuseppe Naqvi [DM] Rocío Montero, PT       User Key  (r) = Recorded By, (t) = Taken By, (c) = Cosigned By    Initials Name Effective Dates    DM Rocío Montero, PT 10/06/15 -     CW Giuseppe Naqvi 12/13/16 -                 IP PT Goals       04/13/17 1218 04/08/17 1138 04/04/17 9716     Bed Mobility PT LTG    Bed Mobility PT LTG, Date Established 04/13/17  -PC  04/04/17  -DM (r) CH (t) DM (c)    Bed Mobility PT LTG, Time to Achieve 1 wk  -PC  1 wk  -DM (r) CH (t) DM (c)    Bed Mobility PT LTG, Activity Type supine to sit/sit to supine  -PC  all bed mobility  -DM (r) CH (t) DM (c)    Bed Mobility PT LTG, [place] Level minimum assist (75% patient effort)  -PC  moderate assist (50% patient effort);2 person assist required  -DM (r) CH (t) DM (c)    Bed Mobility PT LTG, Date Goal Reviewed  04/08/17  -     Bed Mobility PT LTG, Outcome  goal no longer appropriate  -     Transfer Training PT LTG    Transfer Training PT LTG, Date Established 04/13/17  -      Transfer Training PT LTG, Time to Achieve 1 wk  -PC      Transfer Training PT LTG, Activity Type sit to stand/stand to sit  -PC      Transfer Training PT LTG, [place] Level minimum assist (75% patient effort)  -PC      Gait Training PT LTG    Gait Training Goal PT LTG, Date Established 04/13/17  -PC      Gait Training Goal PT LTG, Time to Achieve 1 wk  -PC      Gait Training Goal PT LTG, [place] Level minimum assist (75% patient effort);2 person assist required  -PC      Gait Training Goal PT LTG, Assist Device walker, rolling  -PC      Gait Training Goal PT LTG, Distance to Achieve 15 ft  -PC      Static Sitting Balance PT LTG    Static Sitting Balance PT LTG, Date Established   04/04/17  -DM (r) CH (t) DM (c)    Static Sitting Balance PT LTG, Time to Achieve   1 wk  -DM (r) CH (t) DM (c)    Static Sitting Balance PT LTG, [place] Level   supervision required  -DM (r) CH (t) DM (c)    Static Sitting Balance PT LTG, Date Goal Reviewed  04/08/17  -     Static Sitting Balance PT LTG, Outcome  goal no longer appropriate  -       User Key  (r) = Recorded By, (t) = Taken By, (c) = Cosigned By    Initials Name Provider Type    DM Rocío Montero, PT Physical Therapist     Iadlia Remy, PT Physical Therapist     Evonne Lau,  PT Physical Therapist    KRISTIAN Torres, PT Student PT Student          Physical Therapy Education     Title: PT OT SLP Therapies (Done)     Topic: Physical Therapy (Done)     Point: Mobility training (Done)    Learning Progress Summary    Learner Readiness Method Response Comment Documented by Status   Patient Acceptance E,TB DU,VU   04/15/17 0939 Done    Eager E,TB,D NR,VU  DM 04/14/17 1618 Done    Acceptance E,D NR  PC 04/13/17 1217 Active    Acceptance E VU,NR  MODE 04/07/17 1636 Done    Acceptance E VU  MB 04/06/17 1745 Done    Acceptance E NR   04/04/17 1338 Active   Family Acceptance E VU  MB 04/06/17 1745 Done               Point: Home exercise program (Done)    Learning Progress Summary    Learner Readiness Method Response Comment Documented by Status   Patient Acceptance E,TB DU,VU   04/15/17 0939 Done    Eager E,TB,D NR,VU  DM 04/14/17 1618 Done    Acceptance E,D NR  PC 04/13/17 1217 Active               Point: Body mechanics (Done)    Learning Progress Summary    Learner Readiness Method Response Comment Documented by Status   Patient Acceptance E,TB DU,VU   04/15/17 0939 Done    Eager E,TB,D NR,VU   04/14/17 1618 Done    Acceptance E,D NR  PC 04/13/17 1217 Active    Acceptance E VU,NR  MODE 04/07/17 1636 Done               Point: Precautions (Done)    Learning Progress Summary    Learner Readiness Method Response Comment Documented by Status   Patient Acceptance E,TB DU,VU   04/15/17 0939 Done    Eager E,TB,D NR,VU   04/14/17 1618 Done    Acceptance E,D NR   04/13/17 1217 Active    Acceptance E VU,NR  MODE 04/07/17 1636 Done                      User Key     Initials Effective Dates Name Provider Type Discipline    DM 10/06/15 -  Rocío Montero, PT Physical Therapist PT    PC 12/01/15 -  Evonne Lau, PT Physical Therapist PT    MODE 04/24/15 -  Miguelito Neri, PTA Physical Therapy Assistant PT     12/13/16 -  Giuseppe Naqvi Physical Therapy Assistant PT     01/31/17 -  Celso Torres,  PT Student PT Student PT    MB 03/06/17 -  Alexia Carias RN Registered Nurse Nurse                    PT Recommendation and Plan  Anticipated Discharge Disposition: inpatient rehabilitation facility, skilled nursing facility  Planned Therapy Interventions: balance training, bed mobility training, gait training, home exercise program, strengthening, transfer training  PT Frequency: daily  Plan of Care Review  Plan Of Care Reviewed With: patient  Progress: progress toward functional goals as expected  Outcome Summary/Follow up Plan: Pt increasing with transfer safety and I to RWX and amb increased distance and safety          Outcome Measures       04/15/17 0900 04/14/17 1600 04/13/17 1200    How much help from another person do you currently need...    Turning from your back to your side while in flat bed without using bedrails? 3  -CW 2  -DM 2  -PC    Moving from lying on back to sitting on the side of a flat bed without bedrails? 3  -CW 2  -DM 2  -PC    Moving to and from a bed to a chair (including a wheelchair)? 3  -CW 3  -DM 2  -PC    Standing up from a chair using your arms (e.g., wheelchair, bedside chair)? 3  -CW 3  -DM 2  -PC    Climbing 3-5 steps with a railing? 1  -CW 1  -DM 1  -PC    To walk in hospital room? 3  -CW 3  -DM 2  -PC    AM-PAC 6 Clicks Score 16  -CW 14  -DM 11  -PC    Functional Assessment    Outcome Measure Options AM-PAC 6 Clicks Basic Mobility (PT)  -CW AM-PAC 6 Clicks Basic Mobility (PT)  -DM       User Key  (r) = Recorded By, (t) = Taken By, (c) = Cosigned By    Initials Name Provider Type    DM Rocío Montero, PT Physical Therapist    PC Evonne Lau, PT Physical Therapist    CW Giuseppe Naqvi Physical Therapy Assistant           Time Calculation:         PT Charges       04/15/17 0940          Time Calculation    Start Time 0908  -CW      Stop Time 0922  -CW      Time Calculation (min) 14 min  -CW      PT Received On 04/15/17  -CW      PT - Next Appointment 04/16/17  -CW         User Key  (r) = Recorded By, (t) = Taken By, (c) = Cosigned By    Initials Name Provider Type    CW Giuseppe Naqvi Physical Therapy Assistant          Therapy Charges for Today     Code Description Service Date Service Provider Modifiers Qty    [id number] HC PT THER PROC EA 15 MIN 4/15/2017 Giuseppe Naqvi GP 1          PT G-Codes  Outcome Measure Options: AM-PAC 6 Clicks Basic Mobility (PT)    Giuseppe Naqvi  4/15/2017

## 2017-04-15 NOTE — PROGRESS NOTES
[address]    4/15/2017    Patient ID:  Name:  Lupe Becker  MRN:  [id number]  1950  66 y.o.  female            CC/Reason for visit: Follow-up for acute on chronic respiratory failure    INTERVAL:  Doing well no acute events.  Still requiring oxygen.  Upright in bed coloring.  Feels generally weaker than prior to hospital stay.    Vitals:  Vitals:    04/14/17 1937 04/14/17 2313 04/15/17 0500 04/15/17 0800   BP: 137/61 141/66  152/77   BP Location: Left arm Left arm  Left arm   Patient Position: Lying Sitting  Sitting   Pulse: 70 77     Resp: 18 18  18   Temp: 98.1 °F (36.7 °C) 97.7 °F (36.5 °C)  98.2 °F (36.8 °C)   TempSrc: Oral Oral  Oral   SpO2: 96% 92%     Weight:   205 lb (93 kg)    Height:         FiO2 (%): 50 %     Body mass index is 41.4 kg/(m^2).    Intake/Output Summary (Last 24 hours) at 04/15/17 0918  Last data filed at 04/15/17 0630   Gross per 24 hour   Intake             2050 ml   Output                2 ml   Net             2048 ml       Exam:  GEN:  No distress, awake, alert, very pleasant.  Has pectus excavatum and very webbed and short neck  LUNGS: Distant and coarse breath sounds, but no wheezing, nonlabored breathing  CV:  Normal S1S2, without murmur, no edema  ABD:  Non tender, no enlarged liver or masses  EXT:  No cyanosis or clubbing    Scheduled meds:      amLODIPine 10 mg Oral Q24H   bisoprolol-hydrochlorothiazide 2 tablet Oral Q24H   enoxaparin 40 mg Subcutaneous Nightly   hydrALAZINE 25 mg Oral Q12H     IV meds:                           Data Review:   I reviewed the patient's medications and new clinical results.  Lab Results   Component Value Date    CALCIUM 8.7 04/15/2017    PHOS 3.8 04/11/2017       Results from last 7 days  Lab Units 04/15/17  0602 04/14/17  1740 04/14/17  0314 04/13/17  0435   AST (SGOT) U/L 21  --  30 33*   MAGNESIUM mg/dL 1.9  --   --   --    SODIUM mmol/L 144  --  142 140   POTASSIUM mmol/L 4.1 3.4* 2.6* 3.0*   CHLORIDE mmol/L  99  --  94* 95*   TOTAL CO2 mmol/L 37.6*  --  38.7* 33.5*   BUN mg/dL 10  --  10 14   CREATININE mg/dL 0.56*  --  0.52* 0.55*   GLUCOSE mg/dL 102*  --  101* 124*   CALCIUM mg/dL 8.7  --  8.6 8.7   WBC 10*3/mm3 8.86  --  10.34 16.21*   HEMOGLOBIN g/dL 10.7*  --  10.7* 11.6*   PLATELETS 10*3/mm3 229  --  242 311   ALT (SGPT) U/L 40*  --  46* 45*           Results from last 7 days  Lab Units 04/12/17  0931   PH, ARTERIAL pH units 7.441   PO2 ART mm Hg 92.4   PCO2, ARTERIAL mm Hg 48.3*   HCO3 ART mmol/L 32.9*       ASSESSMENT:   Acute on chronic hypoxic and hypercapnic respiratory failure  Hypernatremia  Community acquired pneumonia  Left adrenal mass  Elevated liver enzymes  Sepsis  Paroxysmal SVT (supraventricular tachycardia)  Scoliosis  Hypertension    PLAN:  Continue advancing patient's mobility with physical therapy.    Her hypertension is also responding well to her new medications    Swallow study today so we can advance oral diet.    Improving significantly.  PT/OT speech then will work on placement.    Charles Garcia MD  4/15/2017

## 2017-04-15 NOTE — PLAN OF CARE
Problem: Patient Care Overview (Adult)  Goal: Plan of Care Review  Outcome: Ongoing (interventions implemented as appropriate)    04/15/17 0940   Coping/Psychosocial Response Interventions   Plan Of Care Reviewed With patient   Patient Care Overview   Progress progress toward functional goals as expected   Outcome Evaluation   Outcome Summary/Follow up Plan Pt increasing with transfer safety and I to RWX and amb increased distance and safety

## 2017-04-16 LAB
ALBUMIN SERPL-MCNC: 3.4 G/DL (ref 3.5–5.2)
ALBUMIN/GLOB SERPL: 1 G/DL
ALP SERPL-CCNC: 81 U/L (ref 39–117)
ALT SERPL W P-5'-P-CCNC: 45 U/L (ref 1–33)
ANION GAP SERPL CALCULATED.3IONS-SCNC: 8.5 MMOL/L
AST SERPL-CCNC: 31 U/L (ref 1–32)
BASOPHILS # BLD AUTO: 0.01 10*3/MM3 (ref 0–0.2)
BASOPHILS NFR BLD AUTO: 0.1 % (ref 0–1.5)
BILIRUB SERPL-MCNC: 0.5 MG/DL (ref 0.1–1.2)
BUN BLD-MCNC: 8 MG/DL (ref 8–23)
BUN/CREAT SERPL: 14.5 (ref 7–25)
CALCIUM SPEC-SCNC: 8.8 MG/DL (ref 8.6–10.5)
CHLORIDE SERPL-SCNC: 96 MMOL/L (ref 98–107)
CO2 SERPL-SCNC: 34.5 MMOL/L (ref 22–29)
CREAT BLD-MCNC: 0.55 MG/DL (ref 0.57–1)
DEPRECATED RDW RBC AUTO: 48.2 FL (ref 37–54)
EOSINOPHIL # BLD AUTO: 0.25 10*3/MM3 (ref 0–0.7)
EOSINOPHIL NFR BLD AUTO: 3.2 % (ref 0.3–6.2)
ERYTHROCYTE [DISTWIDTH] IN BLOOD BY AUTOMATED COUNT: 15.3 % (ref 11.7–13)
GFR SERPL CREATININE-BSD FRML MDRD: 111 ML/MIN/1.73
GLOBULIN UR ELPH-MCNC: 3.3 GM/DL
GLUCOSE BLD-MCNC: 137 MG/DL (ref 65–99)
HCT VFR BLD AUTO: 35.6 % (ref 35.6–45.5)
HGB BLD-MCNC: 10.7 G/DL (ref 11.9–15.5)
IMM GRANULOCYTES # BLD: 0.03 10*3/MM3 (ref 0–0.03)
IMM GRANULOCYTES NFR BLD: 0.4 % (ref 0–0.5)
LYMPHOCYTES # BLD AUTO: 1.21 10*3/MM3 (ref 0.9–4.8)
LYMPHOCYTES NFR BLD AUTO: 15.6 % (ref 19.6–45.3)
MCH RBC QN AUTO: 26.4 PG (ref 26.9–32)
MCHC RBC AUTO-ENTMCNC: 30.1 G/DL (ref 32.4–36.3)
MCV RBC AUTO: 87.7 FL (ref 80.5–98.2)
MONOCYTES # BLD AUTO: 0.83 10*3/MM3 (ref 0.2–1.2)
MONOCYTES NFR BLD AUTO: 10.7 % (ref 5–12)
NEUTROPHILS # BLD AUTO: 5.45 10*3/MM3 (ref 1.9–8.1)
NEUTROPHILS NFR BLD AUTO: 70 % (ref 42.7–76)
PLATELET # BLD AUTO: 243 10*3/MM3 (ref 140–500)
PMV BLD AUTO: 11.3 FL (ref 6–12)
POTASSIUM BLD-SCNC: 3.4 MMOL/L (ref 3.5–5.2)
PROT SERPL-MCNC: 6.7 G/DL (ref 6–8.5)
RBC # BLD AUTO: 4.06 10*6/MM3 (ref 3.9–5.2)
SODIUM BLD-SCNC: 139 MMOL/L (ref 136–145)
WBC NRBC COR # BLD: 7.78 10*3/MM3 (ref 4.5–10.7)

## 2017-04-16 PROCEDURE — 85025 COMPLETE CBC W/AUTO DIFF WBC: CPT | Performed by: INTERNAL MEDICINE

## 2017-04-16 PROCEDURE — 97110 THERAPEUTIC EXERCISES: CPT

## 2017-04-16 PROCEDURE — 80053 COMPREHEN METABOLIC PANEL: CPT | Performed by: INTERNAL MEDICINE

## 2017-04-16 PROCEDURE — 84132 ASSAY OF SERUM POTASSIUM: CPT | Performed by: INTERNAL MEDICINE

## 2017-04-16 PROCEDURE — 25010000002 ENOXAPARIN PER 10 MG: Performed by: INTERNAL MEDICINE

## 2017-04-16 RX ADMIN — POTASSIUM CHLORIDE 40 MEQ: 1.5 POWDER, FOR SOLUTION ORAL at 19:38

## 2017-04-16 RX ADMIN — BISOPROLOL FUMARATE AND HYDROCHLOROTHIAZIDE 2 TABLET: 6.25; 2.5 TABLET ORAL at 10:00

## 2017-04-16 RX ADMIN — POTASSIUM CHLORIDE 40 MEQ: 1.5 POWDER, FOR SOLUTION ORAL at 14:40

## 2017-04-16 RX ADMIN — ENOXAPARIN SODIUM 40 MG: 40 INJECTION SUBCUTANEOUS at 21:04

## 2017-04-16 RX ADMIN — HYDRALAZINE HYDROCHLORIDE 25 MG: 25 TABLET ORAL at 21:04

## 2017-04-16 RX ADMIN — HYDRALAZINE HYDROCHLORIDE 25 MG: 25 TABLET ORAL at 10:00

## 2017-04-16 RX ADMIN — AMLODIPINE BESYLATE 10 MG: 10 TABLET ORAL at 10:00

## 2017-04-16 NOTE — PLAN OF CARE
Problem: Patient Care Overview (Adult)  Goal: Plan of Care Review  Outcome: Ongoing (interventions implemented as appropriate)    04/16/17 3660   Coping/Psychosocial Response Interventions   Plan Of Care Reviewed With patient   Patient Care Overview   Progress improving   Outcome Evaluation   Outcome Summary/Follow up Plan pt on strict falls precautions. potassium replaced. pt denies pain. pt wrked with physical therapy today. pt currently resting in bed       Goal: Adult Individualization and Mutuality  Outcome: Ongoing (interventions implemented as appropriate)  Goal: Discharge Needs Assessment  Outcome: Ongoing (interventions implemented as appropriate)    Problem: Respiratory Insufficiency (Adult)  Goal: Acid/Base Balance  Outcome: Ongoing (interventions implemented as appropriate)  Goal: Effective Ventilation  Outcome: Ongoing (interventions implemented as appropriate)    Problem: Fall Risk (Adult)  Goal: Absence of Falls  Outcome: Ongoing (interventions implemented as appropriate)    Problem: Skin Integrity Impairment, Risk/Actual (Adult)  Goal: Skin Integrity/Wound Healing  Outcome: Ongoing (interventions implemented as appropriate)

## 2017-04-16 NOTE — PROGRESS NOTES
Acute Care - Physical Therapy Treatment Note  [facility]     Patient Name: Lupe Becker  : 1950  MRN: [id number]  Today's Date: 2017  Onset of Illness/Injury or Date of Surgery Date: 17  Date of Referral to PT: 17  Referring Physician: Anne-Marie    Admit Date: 3/31/2017    Visit Dx:    ICD-10-CM ICD-9-CM   1. Sepsis, due to unspecified organism A41.9 038.9     995.91   2. Acute respiratory failure with hypoxia and hypercapnia J96.01 518.81    J96.02    3. Pneumonia of right lower lobe due to infectious organism J18.9 483.8   4. Leukocytosis, unspecified type D72.829 288.60   5. Generalized weakness R53.1 780.79     Patient Active Problem List   Diagnosis   • Accelerated essential hypertension   • Hypoxia   • Cervical fusion syndrome   • Abnormal presence of protein in urine   • Fast heart beat   • Ventricular pre-excitation with arrhythmia   • Adult onset hypothyroidism   • Anxiety about health   • Sepsis   • Paroxysmal SVT (supraventricular tachycardia)               Adult Rehabilitation Note       17 0900 04/15/17 0900 17 1603    Rehab Assessment/Intervention    Discipline physical therapy assistant  -CW physical therapy assistant  -CW physical therapist  -DM    Document Type therapy note (daily note)  -CW therapy note (daily note)  -CW therapy note (daily note)  -DM    Subjective Information agree to therapy;complains of;fatigue  -CW agree to therapy;complains of;weakness;fatigue  -CW no complaints;agree to therapy  -DM    Patient Effort, Rehab Treatment good  -CW good  -CW good  -DM    Symptoms Noted During/After Treatment   none  -DM    Precautions/Limitations fall precautions;oxygen therapy device and L/min  -CW fall precautions;oxygen therapy device and L/min  -CW fall precautions  -DM    Recorded by [CW] Giuseppe Naqvi [CW] Giuseppe Naqvi [DM] Rocío Montero PT    Vital Signs    Pre SpO2 (%)   93  -DM    O2 Delivery Pre Treatment supplemental O2  -CW supplemental  O2  -CW supplemental O2  -DM    Pre Patient Position   Sitting  -DM    Recorded by [CW] Giuseppe Naqvi [CW] Giuseppe Naqvi [DM] Rocío Montero, PT    Pain Assessment    Pain Assessment No/denies pain  -CW No/denies pain  -CW No/denies pain  -DM    Recorded by [CW] Giuseppe Naqvi [CW] Giuseppe Naqvi [DM] Rocío Montero, PT    Vision Assessment/Intervention    Visual Impairment   WFL  -DM    Recorded by   [DM] Rocío Montero, PT    Cognitive Assessment/Intervention    Current Cognitive/Communication Assessment functional  -CW functional  -CW functional  -DM    Orientation Status oriented x 4  -CW oriented x 4  -CW oriented x 4  -DM    Follows Commands/Answers Questions 100% of the time  -% of the time  -% of the time  -DM    Personal Safety WNL/WFL  -CW WNL/WFL  -CW     Personal Safety Interventions fall prevention program maintained;gait belt;nonskid shoes/slippers when out of bed  -CW fall prevention program maintained;gait belt;nonskid shoes/slippers when out of bed  -CW fall prevention program maintained  -DM    Recorded by [CW] Giuseppe Naqvi [CW] Giuseppe Naqvi [DM] Rocío Montero, PT    Bed Mobility, Assessment/Treatment    Bed Mob, Supine to Sit, [place]   not tested  -DM    Bed Mob, Sit to Supine, [place] not tested  -CW not tested  -CW not tested  -DM    Bed Mobility, Comment sitting EOB  -CW sitting EOB  -CW sitting at EOB  -DM    Recorded by [CW] Giuseppe Naqvi [CW] Giuseppe Naqvi [DM] Rocío Montero, PT    Transfer Assessment/Treatment    Transfers, Sit-Stand [place] contact guard assist  -CW contact guard assist  -CW minimum assist (75% patient effort);1 person + 1 person to manage equipment;verbal cues required  -DM    Transfers, Stand-Sit [place] contact guard assist  -CW contact guard assist  -CW minimum assist (75% patient effort);verbal cues required;1 person + 1 person to manage equipment  -DM    Transfers, Sit-Stand-Sit, Assist Device  rolling walker  -CW rolling walker  -CW standard walker  -DM    Transfer, Safety Issues   balance decreased during turns;step length decreased  -DM    Transfer, Impairments   strength decreased;impaired balance  -DM    Transfer, Comment   cues for hand placement  -DM    Recorded by [CW] Giuseppe Naqvi [CW] Giuseppe Naqvi [DM] Rocío Montero, PT    Gait Assessment/Treatment    Gait, [place] Level contact guard assist  -CW contact guard assist  -CW contact guard assist;verbal cues required;1 person + 1 person to manage equipment  -DM    Gait, Assistive Device rolling walker  -CW rolling walker  -CW standard walker  -DM    Gait, Distance (Feet) 120  -CW 60  -CW 20  -DM    Gait, Gait Pattern Analysis  3-point gait  -CW 3-point gait  -DM    Gait, Gait Deviations [place] decreased;step length decreased;stride length decreased  -CW [place] decreased;step length decreased;stride length decreased  -CW [place] decreased;decreased heel strike;double stance time increased;step length decreased;toe-to-floor clearance decreased  -DM    Gait, Safety Issues   balance decreased during turns;step length decreased;supplemental O2  -DM    Gait, Impairments   strength decreased;impaired balance  -DM    Gait, Comment   try RW next visit  -DM    Recorded by [CW] Giuseppe Naqvi [CW] Giuseppe Naqvi [DM] Rocío Montero, PT    Motor Skills/Interventions    Additional Documentation   Balance Skills Training (Group)  -DM    Recorded by   [DM] Rocío Montero PT    Balance Skills Training    Sitting-Level of Assistance   Distant supervision  -DM    Standing-Level of Assistance   Contact guard  -DM    Static Standing Balance Support   assistive device  -DM    Gait Balance-Level of Assistance   Contact guard  -DM    Gait Balance Support   assistive device  -DM    Recorded by   [DM] Rocío Montero, PT    Therapy Exercises    Bilateral Lower Extremities AROM:;10 reps;sitting;ankle pumps/circles;LAQ  -CW AROM:;10 reps;sitting;ankle  pumps/circles;LAQ  -CW AROM:;10 reps;sitting;ankle pumps/circles;LAQ  -DM    Recorded by [CW] Giuseppe Naqvi [CW] Giuseppe Naqvi [DM] Rocío Montero, PT    Positioning and Restraints    Pre-Treatment Position in bed  -CW in bed  -CW in bed  -DM    Post Treatment Position bed  -CW bed  -CW bed  -DM    In Bed notified nsg;sitting EOB;call light within reach;encouraged to call for assist;exit alarm on  -CW notified nsg;sitting EOB;call light within reach;encouraged to call for assist  -CW notified nsg;sitting EOB;call light within reach;encouraged to call for assist;exit alarm on;with family/caregiver  -DM    Recorded by [CW] Giuseppe Naqvi [CW] Giuseppe Naqvi [DM] Rocío Montero, PT      User Key  (r) = Recorded By, (t) = Taken By, (c) = Cosigned By    Initials Name Effective Dates    DM Rocío Montero, PT 10/06/15 -     CW Giuseppe Naqvi 12/13/16 -                 IP PT Goals       04/13/17 1218 04/08/17 1138 04/04/17 1339    Bed Mobility PT LTG    Bed Mobility PT LTG, Date Established 04/13/17  -PC  04/04/17  -DM (r) CH (t) DM (c)    Bed Mobility PT LTG, Time to Achieve 1 wk  -PC  1 wk  -DM (r) CH (t) DM (c)    Bed Mobility PT LTG, Activity Type supine to sit/sit to supine  -PC  all bed mobility  -DM (r) CH (t) DM (c)    Bed Mobility PT LTG, [place] Level minimum assist (75% patient effort)  -PC  moderate assist (50% patient effort);2 person assist required  -DM (r) CH (t) DM (c)    Bed Mobility PT LTG, Date Goal Reviewed  04/08/17  -LH     Bed Mobility PT LTG, Outcome  goal no longer appropriate  -LH     Transfer Training PT LTG    Transfer Training PT LTG, Date Established 04/13/17  -PC      Transfer Training PT LTG, Time to Achieve 1 wk  -PC      Transfer Training PT LTG, Activity Type sit to stand/stand to sit  -PC      Transfer Training PT LTG, [place] Level minimum assist (75% patient effort)  -PC      Gait Training PT LTG    Gait Training Goal PT LTG, Date Established 04/13/17  -PC       Gait Training Goal PT LTG, Time to Achieve 1 wk  -PC      Gait Training Goal PT LTG, [place] Level minimum assist (75% patient effort);2 person assist required  -PC      Gait Training Goal PT LTG, Assist Device walker, rolling  -PC      Gait Training Goal PT LTG, Distance to Achieve 15 ft  -PC      Static Sitting Balance PT LTG    Static Sitting Balance PT LTG, Date Established   04/04/17  -DM (r) CH (t) DM (c)    Static Sitting Balance PT LTG, Time to Achieve   1 wk  -DM (r) CH (t) DM (c)    Static Sitting Balance PT LTG, [place] Level   supervision required  -DM (r) CH (t) DM (c)    Static Sitting Balance PT LTG, Date Goal Reviewed  04/08/17  -LH     Static Sitting Balance PT LTG, Outcome  goal no longer appropriate  -       User Key  (r) = Recorded By, (t) = Taken By, (c) = Cosigned By    Initials Name Provider Type    DM Rocío Montero, PT Physical Therapist    LH Idalia Remy, PT Physical Therapist    PC Evonne Lau, PT Physical Therapist     Celso Torres, PT Student PT Student          Physical Therapy Education     Title: PT OT SLP Therapies (Done)     Topic: Physical Therapy (Done)     Point: Mobility training (Done)    Learning Progress Summary    Learner Readiness Method Response Comment Documented by Status   Patient Acceptance E,TB DU,VU  CW 04/16/17 0928 Done    Acceptance E,TB DU,VU  CW 04/15/17 0939 Done    Eager E,TB,D NR,VU  DM 04/14/17 1618 Done    Acceptance E,D NR  PC 04/13/17 1217 Active    Acceptance E VU,NR  MODE 04/07/17 1636 Done    Acceptance E VU  MB 04/06/17 1745 Done    Acceptance E NR  CH 04/04/17 1338 Active   Family Acceptance E VU  MB 04/06/17 1745 Done               Point: Home exercise program (Done)    Learning Progress Summary    Learner Readiness Method Response Comment Documented by Status   Patient Acceptance E,TB DU,VU  CW 04/16/17 0928 Done    Acceptance E,TB DU,VU  CW 04/15/17 0939 Done    Eager E,TB,D NR,VU  DM 04/14/17 1618 Done    Acceptance E,D NR   PC 04/13/17 1217 Active               Point: Body mechanics (Done)    Learning Progress Summary    Learner Readiness Method Response Comment Documented by Status   Patient Acceptance E,TB DU,VU  CW 04/16/17 0928 Done    Acceptance E,TB DU,VU  CW 04/15/17 0939 Done    Eager E,TB,D NR,VU  DM 04/14/17 1618 Done    Acceptance E,D NR  PC 04/13/17 1217 Active    Acceptance E VU,NR  MODE 04/07/17 1636 Done               Point: Precautions (Done)    Learning Progress Summary    Learner Readiness Method Response Comment Documented by Status   Patient Acceptance E,TB DU,VU  CW 04/16/17 0928 Done    Acceptance E,TB DU,VU  CW 04/15/17 0939 Done    Eager E,TB,D NR,VU  DM 04/14/17 1618 Done    Acceptance E,D NR  PC 04/13/17 1217 Active    Acceptance E VU,NR  MODE 04/07/17 1636 Done                      User Key     Initials Effective Dates Name Provider Type Discipline     10/06/15 -  Rocío Montero, PT Physical Therapist PT     12/01/15 -  Evonne Lau, PT Physical Therapist PT    MODE 04/24/15 -  Miguelito Neir, PTA Physical Therapy Assistant PT     12/13/16 -  Giuseppe Naqvi Physical Therapy Assistant PT     01/31/17 -  Celso Torres, PT Student PT Student PT    MB 03/06/17 -  Alexia Carias, RN Registered Nurse Nurse                    PT Recommendation and Plan  Anticipated Discharge Disposition: inpatient rehabilitation facility, skilled nursing facility  Planned Therapy Interventions: balance training, bed mobility training, gait training, home exercise program, strengthening, transfer training  PT Frequency: daily  Plan of Care Review  Plan Of Care Reviewed With: patient  Progress: improving  Outcome Summary/Follow up Plan: Pt improving with transfers and amb distance with RWX          Outcome Measures       04/16/17 0900 04/15/17 0900 04/14/17 1600    How much help from another person do you currently need...    Turning from your back to your side while in flat bed without using bedrails? 3  -CW 3  -CW 2  -DM     Moving from lying on back to sitting on the side of a flat bed without bedrails? 3  -CW 3  -CW 2  -DM    Moving to and from a bed to a chair (including a wheelchair)? 3  -CW 3  -CW 3  -DM    Standing up from a chair using your arms (e.g., wheelchair, bedside chair)? 3  -CW 3  -CW 3  -DM    Climbing 3-5 steps with a railing? 1  -CW 1  -CW 1  -DM    To walk in hospital room? 3  -CW 3  -CW 3  -DM    AM-PAC 6 Clicks Score 16  -CW 16  -CW 14  -DM    Functional Assessment    Outcome Measure Options AM-PAC 6 Clicks Basic Mobility (PT)  -CW AM-PAC 6 Clicks Basic Mobility (PT)  -CW AM-PAC 6 Clicks Basic Mobility (PT)  -DM      04/13/17 1200          How much help from another person do you currently need...    Turning from your back to your side while in flat bed without using bedrails? 2  -PC      Moving from lying on back to sitting on the side of a flat bed without bedrails? 2  -PC      Moving to and from a bed to a chair (including a wheelchair)? 2  -PC      Standing up from a chair using your arms (e.g., wheelchair, bedside chair)? 2  -PC      Climbing 3-5 steps with a railing? 1  -PC      To walk in hospital room? 2  -PC      AM-PAC 6 Clicks Score 11  -PC        User Key  (r) = Recorded By, (t) = Taken By, (c) = Cosigned By    Initials Name Provider Type    DELPHINE Montero, PT Physical Therapist    PC Evonne Lau, PT Physical Therapist    CW Giuseppe Naqvi Physical Therapy Assistant           Time Calculation:         PT Charges       04/16/17 0929          Time Calculation    Start Time 0913  -CW      Stop Time 0929  -CW      Time Calculation (min) 16 min  -CW      PT Received On 04/16/17  -CW      PT - Next Appointment 04/17/17  -CW        User Key  (r) = Recorded By, (t) = Taken By, (c) = Cosigned By    Initials Name Provider Type    CW Giuseppe Naqvi Physical Therapy Assistant          Therapy Charges for Today     Code Description Service Date Service Provider Modifiers Qty    17080343393  PT THER  PROC EA 15 MIN 4/15/2017 Giuseppe Naqvi GP 1    33834149176 HC PT THER PROC EA 15 MIN 4/16/2017 Giuseppe Naqvi GP 1          PT G-Codes  Outcome Measure Options: AM-PAC 6 Clicks Basic Mobility (PT)    Giuseppe Naqvi  4/16/2017

## 2017-04-16 NOTE — PROGRESS NOTES
[address]    4/16/2017    Patient ID:  Name:  Lupe Becker  MRN:  [id number]  1950  66 y.o.  female            CC/Reason for visit: Follow-up for acute on chronic respiratory failure    INTERVAL:  Working with pt.  Passed speech eval    Vitals:  Vitals:    04/16/17 0004 04/16/17 0500 04/16/17 0815 04/16/17 1200   BP: 140/60  151/79 125/79   BP Location: Left arm  Left arm Left arm   Patient Position: Lying  Sitting Sitting   Pulse: 69      Resp: 16 18 18   Temp: 97.8 °F (36.6 °C)  97.5 °F (36.4 °C) 98.4 °F (36.9 °C)   TempSrc: Oral  Oral Oral   SpO2: 95%   100%   Weight:  164 lb 3.2 oz (74.5 kg)     Height:         FiO2 (%): 50 %     Body mass index is 33.16 kg/(m^2).    Intake/Output Summary (Last 24 hours) at 04/16/17 1611  Last data filed at 04/16/17 0622   Gross per 24 hour   Intake             1560 ml   Output              900 ml   Net              660 ml       Exam:  GEN:  No distress, awake, alert, very pleasant.  Has pectus excavatum and very webbed and short neck  LUNGS: Distant and coarse breath sounds, but no wheezing, nonlabored breathing  CV:  Normal S1S2, without murmur, no edema  ABD:  Non tender, no enlarged liver or masses  EXT:  No cyanosis or clubbing    Scheduled meds:      amLODIPine 10 mg Oral Q24H   bisoprolol-hydrochlorothiazide 2 tablet Oral Q24H   enoxaparin 40 mg Subcutaneous Nightly   hydrALAZINE 25 mg Oral Q12H     IV meds:                           Data Review:   I reviewed the patient's medications and new clinical results.  Lab Results   Component Value Date    CALCIUM 8.8 04/16/2017    PHOS 3.8 04/11/2017       Results from last 7 days  Lab Units 04/16/17  0653 04/15/17  0602 04/14/17  1740 04/14/17  0314   AST (SGOT) U/L 31 21  --  30   MAGNESIUM mg/dL  --  1.9  --   --    SODIUM mmol/L 139 144  --  142   POTASSIUM mmol/L 3.4* 4.1 3.4* 2.6*   CHLORIDE mmol/L 96* 99  --  94*   TOTAL CO2 mmol/L 34.5* 37.6*  --  38.7*   BUN mg/dL 8 10  --  10    CREATININE mg/dL 0.55* 0.56*  --  0.52*   GLUCOSE mg/dL 137* 102*  --  101*   CALCIUM mg/dL 8.8 8.7  --  8.6   WBC 10*3/mm3 7.78 8.86  --  10.34   HEMOGLOBIN g/dL 10.7* 10.7*  --  10.7*   PLATELETS 10*3/mm3 243 229  --  242   ALT (SGPT) U/L 45* 40*  --  46*           Results from last 7 days  Lab Units 04/12/17  0931   PH, ARTERIAL pH units 7.441   PO2 ART mm Hg 92.4   PCO2, ARTERIAL mm Hg 48.3*   HCO3 ART mmol/L 32.9*       ASSESSMENT:   Acute on chronic hypoxic and hypercapnic respiratory failure  Hypernatremia  Community acquired pneumonia  Left adrenal mass  Elevated liver enzymes  Sepsis  Paroxysmal SVT (supraventricular tachycardia)  Scoliosis  Hypertension    PLAN:  Continue advancing patient's mobility with physical therapy.      Improving significantly - great to see.    PT/OT speech appreciated.    Discharge to rehab Monday or Tuesday    Charles Garcia MD  4/16/2017

## 2017-04-16 NOTE — PLAN OF CARE
Problem: Patient Care Overview (Adult)  Goal: Plan of Care Review  Outcome: Ongoing (interventions implemented as appropriate)    04/16/17 0154   Coping/Psychosocial Response Interventions   Plan Of Care Reviewed With patient   Patient Care Overview   Progress improving   Outcome Evaluation   Outcome Summary/Follow up Plan Bed alarm on. A&O X4, but forgetful at times. Denies pain. Sats good on O2 3L.        Goal: Adult Individualization and Mutuality  Outcome: Ongoing (interventions implemented as appropriate)  Goal: Discharge Needs Assessment  Outcome: Ongoing (interventions implemented as appropriate)    Problem: Respiratory Insufficiency (Adult)  Goal: Acid/Base Balance  Outcome: Ongoing (interventions implemented as appropriate)  Goal: Effective Ventilation  Outcome: Ongoing (interventions implemented as appropriate)    Problem: Fall Risk (Adult)  Goal: Absence of Falls  Outcome: Ongoing (interventions implemented as appropriate)

## 2017-04-16 NOTE — NURSING NOTE
Per today's therapy documentation txs are with [facility];  Ambulated 120 ft with R wx with [facility]. Too high level for acute rehab.  Would like to discuss subacute rehab with [facility].  Message left on [facility] weekend pager.  Would also like to work with therapy on stairs--has multiple flights entering and inside of home and would like to see if she can manage steps.  Will sign off regarding acute rehab.  Thank you,  Elida Stevenson  Rehab Admission Nurse

## 2017-04-17 LAB
ALBUMIN SERPL-MCNC: 3.3 G/DL (ref 3.5–5.2)
ALBUMIN/GLOB SERPL: 1 G/DL
ALP SERPL-CCNC: 79 U/L (ref 39–117)
ALT SERPL W P-5'-P-CCNC: 46 U/L (ref 1–33)
ANION GAP SERPL CALCULATED.3IONS-SCNC: 5.4 MMOL/L
AST SERPL-CCNC: 26 U/L (ref 1–32)
BASOPHILS # BLD AUTO: 0.01 10*3/MM3 (ref 0–0.2)
BASOPHILS NFR BLD AUTO: 0.2 % (ref 0–1.5)
BILIRUB SERPL-MCNC: 0.4 MG/DL (ref 0.1–1.2)
BUN BLD-MCNC: 9 MG/DL (ref 8–23)
BUN/CREAT SERPL: 16.1 (ref 7–25)
CALCIUM SPEC-SCNC: 8.7 MG/DL (ref 8.6–10.5)
CHLORIDE SERPL-SCNC: 100 MMOL/L (ref 98–107)
CO2 SERPL-SCNC: 39.6 MMOL/L (ref 22–29)
CREAT BLD-MCNC: 0.56 MG/DL (ref 0.57–1)
DEPRECATED RDW RBC AUTO: 50.4 FL (ref 37–54)
EOSINOPHIL # BLD AUTO: 0.27 10*3/MM3 (ref 0–0.7)
EOSINOPHIL NFR BLD AUTO: 4.1 % (ref 0.3–6.2)
ERYTHROCYTE [DISTWIDTH] IN BLOOD BY AUTOMATED COUNT: 15.6 % (ref 11.7–13)
GFR SERPL CREATININE-BSD FRML MDRD: 109 ML/MIN/1.73
GLOBULIN UR ELPH-MCNC: 3.2 GM/DL
GLUCOSE BLD-MCNC: 107 MG/DL (ref 65–99)
HCT VFR BLD AUTO: 36.1 % (ref 35.6–45.5)
HGB BLD-MCNC: 10.5 G/DL (ref 11.9–15.5)
IMM GRANULOCYTES # BLD: 0.03 10*3/MM3 (ref 0–0.03)
IMM GRANULOCYTES NFR BLD: 0.5 % (ref 0–0.5)
LYMPHOCYTES # BLD AUTO: 0.94 10*3/MM3 (ref 0.9–4.8)
LYMPHOCYTES NFR BLD AUTO: 14.3 % (ref 19.6–45.3)
MCH RBC QN AUTO: 26.3 PG (ref 26.9–32)
MCHC RBC AUTO-ENTMCNC: 29.1 G/DL (ref 32.4–36.3)
MCV RBC AUTO: 90.3 FL (ref 80.5–98.2)
MONOCYTES # BLD AUTO: 1.16 10*3/MM3 (ref 0.2–1.2)
MONOCYTES NFR BLD AUTO: 17.6 % (ref 5–12)
NEUTROPHILS # BLD AUTO: 4.18 10*3/MM3 (ref 1.9–8.1)
NEUTROPHILS NFR BLD AUTO: 63.3 % (ref 42.7–76)
PLATELET # BLD AUTO: 232 10*3/MM3 (ref 140–500)
PMV BLD AUTO: 10.8 FL (ref 6–12)
POTASSIUM BLD-SCNC: 4.4 MMOL/L (ref 3.5–5.2)
POTASSIUM BLD-SCNC: 4.5 MMOL/L (ref 3.5–5.2)
PROT SERPL-MCNC: 6.5 G/DL (ref 6–8.5)
RBC # BLD AUTO: 4 10*6/MM3 (ref 3.9–5.2)
SODIUM BLD-SCNC: 145 MMOL/L (ref 136–145)
WBC NRBC COR # BLD: 6.59 10*3/MM3 (ref 4.5–10.7)

## 2017-04-17 PROCEDURE — 25010000002 ENOXAPARIN PER 10 MG: Performed by: INTERNAL MEDICINE

## 2017-04-17 PROCEDURE — 97110 THERAPEUTIC EXERCISES: CPT

## 2017-04-17 PROCEDURE — 85025 COMPLETE CBC W/AUTO DIFF WBC: CPT | Performed by: INTERNAL MEDICINE

## 2017-04-17 PROCEDURE — 80053 COMPREHEN METABOLIC PANEL: CPT | Performed by: INTERNAL MEDICINE

## 2017-04-17 RX ADMIN — BISOPROLOL FUMARATE AND HYDROCHLOROTHIAZIDE 2 TABLET: 6.25; 2.5 TABLET ORAL at 09:43

## 2017-04-17 RX ADMIN — AMLODIPINE BESYLATE 10 MG: 10 TABLET ORAL at 09:43

## 2017-04-17 RX ADMIN — HYDRALAZINE HYDROCHLORIDE 25 MG: 25 TABLET ORAL at 22:14

## 2017-04-17 RX ADMIN — ENOXAPARIN SODIUM 40 MG: 40 INJECTION SUBCUTANEOUS at 22:14

## 2017-04-17 RX ADMIN — HYDRALAZINE HYDROCHLORIDE 25 MG: 25 TABLET ORAL at 09:43

## 2017-04-17 NOTE — PLAN OF CARE
Problem: Patient Care Overview (Adult)  Goal: Plan of Care Review  Outcome: Ongoing (interventions implemented as appropriate)    04/17/17 0914   Coping/Psychosocial Response Interventions   Plan Of Care Reviewed With patient   Patient Care Overview   Progress progress toward functional goals as expected   Outcome Evaluation   Outcome Summary/Follow up Plan Pt is increasing with strength and balance when amb with RWX

## 2017-04-17 NOTE — PROGRESS NOTES
Discharge Planning Assessment  [facility]     Patient Name: Lupe Becker  MRN: [id number]  Today's Date: 4/17/2017    Admit Date: 3/31/2017          Discharge Needs Assessment     None            Discharge Plan       04/17/17 1429    Case Management/Social Work Plan    Plan Home with help from her dtr and friend, and [facility]      Additional Comments Incoming call recieved from [facility], pt is too high level functioning for [facility]. Updated pt at bedside. R2R and list of HH and [facility]'s given to pt. She would like to go home with [facility], referral given to Holly. Pt stated her dtr should be able to stay with her for a couple days to help her. She also stated she has a friend that would be able to help her out at home as well. [facility] to follow.          Discharge Placement     Facility/Agency Request Status Selected? Address Phone Number Fax Number    [facility] Pending - No Request Sent     [address] [address] [phone]                Demographic Summary     None            Functional Status     None            Psychosocial     None            Abuse/Neglect     None            Legal     None            Substance Abuse     None            Patient Forms     None          Chantelle Ang, RN

## 2017-04-17 NOTE — PAYOR COMM NOTE
"Zurdo Lane (66 y.o. Female)     PLEASE SEE ATTACHED CLINICAL REVIEW.  PT. REMAINS INPT AUTH [facility] .    REF#[id number]    PLEASE CALL   OR  [phone] WITH INPT CONTINUED STAY AUTHORIZATION.    THANK YOU    MARSHALL ADRIAN LPN, CCP      Date of Birth Social Security Number Address Home Phone MRN    [id number]  [id number] [address] [phone] [id number]    Catholic Marital Status          None Single       Admission Date Admission Type Admitting Provider Attending Provider Department, Room/Bed    3/31/17 Emergency Rohit Rausch MD Anaya, Gerard ESCALERA MD [address], 641/1    Discharge Date Discharge Disposition Discharge Destination                      Attending Provider: Gerard Xie MD     Allergies:  Epinephrine    Isolation:  None   Infection:  None   Code Status:  FULL    Ht:  59\" (149.9 cm)   Wt:  162 lb 4.8 oz (73.6 kg)    Admission Cmt:  None   Principal Problem:  None                Active Insurance as of 3/31/2017     Primary Coverage     Payor Plan Insurance Group Employer/Plan Group    ANTHEM BLUE CROSS ANTHEM BLUE CROSS BLUE SHIELD PPO [id number]     Payor Plan Address Payor Plan Phone Number Effective From Effective To    [phone] 9/1/2016     [place] [address]       Subscriber Name Subscriber Birth Date Member ID       ZURDO LANE 1950 [id number]                 Emergency Contacts      (Rel.) Home Phone Work Phone Mobile Phone    Kandace Vale (Daughter) [phone] -- --            Vital Signs (last 72 hrs)       04/14 0700  -  04/15 0659 04/15 0700  -  04/16 0659 04/16 0700  -  04/17 0659 04/17 0700  -  04/17 0807   Most Recent    Temp (°F) 97.7 -  98.4    97.4 -  98.6    97.2 -  98.4      98.2     98.2 (36.8)    Heart Rate 70 -  78    69 -  75    61 -  72       67    Resp 18 -  20    16 -  18      18      19     19    /77 -  168/84    121/69 -  152/77    125/79 -  151/79 "      146/68     146/68    SpO2 (%) 92 -  99    94 -  97    96 -  100       96          Intake & Output (last 3 days)       04/14 0701 - 04/15 0700 04/15 0701 - 04/16 0700 04/16 0701 - 04/17 0700 04/17 0701 - 04/18 0700    P.O. 2050 1560      I.V. (mL/kg)        Other        Total Intake(mL/kg) 2050 (22) 1560 (20.9)      Urine (mL/kg/hr) 0 (0) 900 (0.5)      Stool 2 (0) 100 (0.1)      Total Output 2 1000        Net +2048 +560                Unmeasured Urine Occurrence 5 x  2 x     Unmeasured Stool Occurrence 2 x 1 x 1 x         Lines, Drains & Airways    Active LDAs     Name:   Placement date:   Placement time:   Site:   Days:    Peripheral IV Line - Single Lumen 04/14/17 1030 median cubital vein (antecubital fossa), left 20 gauge  04/14/17    1030      2                Hospital Medications (all)       Dose Frequency Start End    acetaminophen (TYLENOL) tablet 1,000 mg 1,000 mg Once 3/31/2017 3/31/2017    Sig - Route: Take 2 tablets by mouth 1 (One) Time. - Oral    albuterol (PROVENTIL) nebulizer solution 0.083% 2.5 mg/3mL 2.5 mg Every 15 Minutes 3/31/2017 3/31/2017    Sig - Route: Take 2.5 mg by nebulization Every 15 (Fifteen) Minutes. - Nebulization    amLODIPine (NORVASC) tablet 10 mg 10 mg Every 24 Hours Scheduled 4/10/2017     Sig - Route: Take 1 tablet by mouth Daily. - Oral    azithromycin (ZITHROMAX) 500 mg 0.9% NaCl (Add-vantage) 250 mL 500 mg Once 3/31/2017 3/31/2017    Sig - Route: Infuse 250 mL into a venous catheter 1 (One) Time. - Intravenous    bisoprolol-hydrochlorothiazide (ZIAC) 2.5-6.25 MG per tablet 2 tablet 2 tablet Every 24 Hours Scheduled 4/12/2017     Sig - Route: Take 2 tablets by mouth Daily. - Oral    bumetanide (BUMEX) injection 1 mg 1 mg Once 4/7/2017 4/7/2017    Sig - Route: Infuse 4 mL into a venous catheter 1 (One) Time. - Intravenous    bumetanide (BUMEX) injection 2 mg 2 mg Once 4/5/2017 4/5/2017    Sig - Route: Infuse 8 mL into a venous catheter 1 (One) Time. - Intravenous     bumetanide (BUMEX) injection 2 mg 2 mg Once 4/5/2017 4/5/2017    Sig - Route: Infuse 8 mL into a venous catheter 1 (One) Time. - Intravenous    bumetanide (BUMEX) injection 2 mg 2 mg Once 4/6/2017 4/6/2017    Sig - Route: Infuse 8 mL into a venous catheter 1 (One) Time. - Intravenous    cefTRIAXone (ROCEPHIN) IVPB 1 g 1 g Once 3/31/2017 3/31/2017    Sig - Route: Infuse 50 mL into a venous catheter 1 (One) Time. - Intravenous    dextrose (D5W) 5 % infusion 100 mL/hr Continuous 4/10/2017 4/10/2017    Sig - Route: Infuse 100 mL/hr into a venous catheter Continuous. - Intravenous    diltiaZEM (CARDIZEM) 25 MG/5ML injection  - ADS Override Pull   4/5/2017 4/5/2017    Notes to Pharmacy: Created by cabinet override    enoxaparin (LOVENOX) syringe 40 mg 40 mg Nightly 4/8/2017     Sig - Route: Inject 0.4 mL under the skin Every Night. - Subcutaneous    FentaNYL Citrate (PF) (SUBLIMAZE) injection 100 mcg 100 mcg Every 1 Hour PRN 4/9/2017 4/11/2017    Sig - Route: Infuse 2 mL into a venous catheter Every 1 (One) Hour As Needed for Severe Pain (7-10). - Intravenous    FentaNYL Citrate (PF) (SUBLIMAZE) injection 200 mcg 200 mcg Once 4/9/2017 4/9/2017    Sig - Route: Infuse 4 mL into a venous catheter 1 (One) Time. - Intravenous    hydrALAZINE (APRESOLINE) tablet 25 mg 25 mg Every 12 Hours Scheduled 4/13/2017     Sig - Route: Take 1 tablet by mouth Every 12 (Twelve) Hours. - Oral    hydrocortisone-bacitracin-zinc oxide-nystatin (MAGIC BARRIER) cream 1 application 1 application As Needed 4/6/2017     Sig - Route: Apply 1 application topically As Needed for skin irritation. - Topical    ipratropium-albuterol (DUO-NEB) nebulizer solution 3 mL 3 mL Once 3/31/2017 3/31/2017    Sig - Route: Take 3 mL by nebulization 1 (One) Time. - Nebulization    lidocaine (XYLOCAINE) 1 % injection 20 mL 20 mL Once 4/11/2017 4/11/2017    Sig - Route: Inject 20 mL under the skin 1 (One) Time. - Subcutaneous    Cosign for Ordering: Accepted by Gerard ESCALERA  "MD Anne-Marie on 4/12/2017 10:08 AM    methylPREDNISolone sodium succinate (SOLU-Medrol) injection 125 mg 125 mg Once 4/9/2017 4/9/2017    Sig - Route: Infuse 2 mL into a venous catheter 1 (One) Time. - Intravenous    methylPREDNISolone sodium succinate (SOLU-Medrol) injection 80 mg 80 mg Once 4/6/2017 4/6/2017    Sig - Route: Infuse 1.28 mL into a venous catheter 1 (One) Time. - Intravenous    Morphine sulfate (PF) injection 3 mg 3 mg Every 2 Hours PRN 4/14/2017 4/21/2017    Sig - Route: Infuse 1.5 mL into a venous catheter Every 2 (Two) Hours As Needed for Moderate Pain (4-6) or Severe Pain (7-10) (shortness of breath or anxiety). - Intravenous    naloxone (NARCAN) injection 0.8 mg 0.8 mg Once 4/5/2017 4/5/2017    Sig - Route: Infuse 2 mL into a venous catheter 1 (One) Time. - Intravenous    ondansetron (ZOFRAN) injection 4 mg 4 mg Once 3/31/2017 4/2/2017    Sig - Route: Infuse 2 mL into a venous catheter 1 (One) Time. - Intravenous    ondansetron (ZOFRAN) injection 4 mg 4 mg Every 4 Hours PRN 4/14/2017     Sig - Route: Infuse 2 mL into a venous catheter Every 4 (Four) Hours As Needed for Nausea or Vomiting. - Intravenous    phenylephrine (JAYANT-SYNEPHRINE) injection 5,000 mcg 5 mg Once 4/9/2017 4/9/2017    Sig - Route: Infuse 0.5 mL into a venous catheter 1 (One) Time. - Intravenous    potassium chloride (KLOR-CON) packet 40 mEq 40 mEq As Needed 4/14/2017     Sig - Route: Take 40 mEq by mouth As Needed (potassium replacement, see admin instructions). - Oral    Linked Group 1:  \"Or\" Linked Group Details        potassium chloride (MICRO-K) CR capsule 40 mEq 40 mEq As Needed 4/14/2017     Sig - Route: Take 4 capsules by mouth As Needed (potassium replacement.  see admin instructions). - Oral    Linked Group 1:  \"Or\" Linked Group Details        potassium chloride 10 mEq in 100 mL IVPB 10 mEq Every 1 Hour PRN 4/14/2017     Sig - Route: Infuse 100 mL into a venous catheter Every 1 (One) Hour As Needed (potassium protocol " "PERIPHERAL - see admin instructions). - Intravenous    Linked Group 1:  \"Or\" Linked Group Details        sodium chloride 0.9 % bolus 1,000 mL 1,000 mL Once 3/31/2017 3/31/2017    Sig - Route: Infuse 1,000 mL into a venous catheter 1 (One) Time. - Intravenous    sodium chloride 0.9 % bolus 2,244 mL 30 mL/kg × 74.8 kg Once 3/31/2017 3/31/2017    Sig - Route: Infuse 2,244 mL into a venous catheter 1 (One) Time. - Intravenous    terbutaline (BRETHINE) injection 0.25 mg 0.25 mg Once 4/9/2017 4/9/2017    Sig - Route: Inject 0.25 mL under the skin 1 (One) Time. - Subcutaneous    vancomycin 1500 mg/500 mL 0.9% NS IVPB ([facility]) 20 mg/kg × 74.8 kg Once 3/31/2017 3/31/2017    Sig - Route: Infuse 500 mL into a venous catheter 1 (One) Time. - Intravenous    acetylcysteine (MUCOMYST) 20 % nebulizer solution 4 mL (Discontinued) 4 mL Every 12 Hours - RT 4/5/2017 4/5/2017    Sig - Route: Take 4 mL by nebulization Every 12 (Twelve) Hours. - Nebulization    acetylcysteine (MUCOMYST) 20 % nebulizer solution 4 mL (Discontinued) 4 mL Every 12 Hours - RT 4/5/2017 4/6/2017    Sig - Route: Take 4 mL by nebulization Every 12 (Twelve) Hours. - Nebulization    acetylcysteine (MUCOMYST) 20 % nebulizer solution 4 mL (Discontinued) 4 mL Every 12 Hours - RT 4/10/2017 4/13/2017    Sig - Route: Take 4 mL by nebulization Every 12 (Twelve) Hours. - Nebulization    acetylcysteine (MUCOMYST) 20 % solution 600 mg (Discontinued) 600 mg Every 12 Hours Scheduled 4/4/2017 4/5/2017    Sig - Route: Administer 3 mL into the airway Every 12 (Twelve) Hours. - Endotracheal    albuterol (PROVENTIL HFA;VENTOLIN HFA) inhaler 6 puff (Discontinued) 6 puff 4 Times Daily - RT 4/4/2017 4/6/2017    Sig - Route: Inhale 6 puffs 4 (Four) Times a Day. - Inhalation    albuterol (PROVENTIL HFA;VENTOLIN HFA) inhaler 6 puff (Discontinued) 6 puff Every 6 Hours - RT 4/9/2017 4/12/2017    Sig - Route: 6 puffs by Vent nebulization route Every 6 (Six) Hours. - Vent nebulization    " albuterol (PROVENTIL) nebulizer solution 0.083% 2.5 mg/3mL (Discontinued) 2.5 mg Every 4 Hours PRN 4/6/2017 4/8/2017    Sig - Route: Take 2.5 mg by nebulization Every 4 (Four) Hours As Needed for Wheezing. - Nebulization    albuterol (PROVENTIL) nebulizer solution 0.083% 2.5 mg/3mL (Discontinued) 2.5 mg Every 6 Hours - RT 4/7/2017 4/8/2017    Sig - Route: Take 2.5 mg by nebulization Every 6 (Six) Hours. - Nebulization    albuterol (PROVENTIL) nebulizer solution 0.083% 2.5 mg/3mL (Discontinued) 2.5 mg Every 6 Hours - RT 4/8/2017 4/9/2017    Sig - Route: Take 2.5 mg by nebulization Every 6 (Six) Hours. - Nebulization    albuterol (PROVENTIL) nebulizer solution 0.5% 2.5 mg/0.5mL (Discontinued) 2.5 mg 4 Times Daily - RT 4/6/2017 4/6/2017    Sig - Route: Take 0.5 mL by nebulization 4 (Four) Times a Day. - Nebulization    albuterol (PROVENTIL) nebulizer solution 0.5% 2.5 mg/0.5mL (Discontinued) 2.5 mg Every 6 Hours While Awake - RT 4/6/2017 4/7/2017    Sig - Route: Take 0.5 mL by nebulization Every 6 (Six) Hours While Awake. - Nebulization    albuterol (PROVENTIL) nebulizer solution 0.5% 2.5 mg/0.5mL (Discontinued) 2.5 mg 4 Times Daily - RT 4/12/2017 4/13/2017    Sig - Route: Take 0.5 mL by nebulization 4 (Four) Times a Day. - Nebulization    aluminum sulfate-calcium acetate (DOMEBORO) packet 1 packet (Discontinued) 1 packet Every 8 Hours Scheduled 4/6/2017 4/8/2017    Sig - Route: Apply 1 packet topically Every 8 (Eight) Hours. - Topical    amLODIPine (NORVASC) tablet 10 mg (Discontinued) 10 mg Every 24 Hours 4/6/2017 4/8/2017    Sig - Route: Take 1 tablet by mouth Daily. - Oral    artificial tears (LUBRIFRESH P.M.) ophthalmic ointment (Discontinued)  4 Times Daily PRN 4/1/2017 4/8/2017    Sig - Route: Administer  to both eyes 4 (Four) Times a Day As Needed (1-2 drops each eye). - Both Eyes    azithromycin (ZITHROMAX) 500 mg 0.9% NaCl (Add-vantage) 250 mL (Discontinued) 500 mg Every 24 Hours 3/31/2017 3/31/2017    Sig -  "Route: Infuse 250 mL into a venous catheter Daily. - Intravenous    Reason for Discontinue: Error    Linked Group 2:  \"And\" Linked Group Details        azithromycin (ZITHROMAX) 500 mg 0.9% NaCl (Add-vantage) 250 mL (Discontinued) 500 mg Every 24 Hours 4/1/2017 4/5/2017    Sig - Route: Infuse 250 mL into a venous catheter Daily. - Intravenous    Reason for Discontinue: Therapy completed    chlorhexidine (PERIDEX) 0.12 % solution 15 mL (Discontinued) 15 mL Every 12 Hours Scheduled 3/31/2017 4/8/2017    Sig - Route: Apply 15 mL to the mouth or throat Every 12 (Twelve) Hours. - Mouth/Throat    cisatracurium (NIMBEX) injection 10 mg (Discontinued) 10 mg Once 4/9/2017 4/10/2017    Sig - Route: Infuse 5 mL into a venous catheter 1 (One) Time. - Intravenous    citalopram (CeleXA) tablet 10 mg (Discontinued) 10 mg Daily 4/5/2017 4/6/2017    Sig - Route: Take 1 tablet by mouth Daily. - Oral    dexmedetomidine (PRECEDEX) 400 MCG/100ML infusion (Discontinued) 0.2-1.5 mcg/kg/hr × 80.5 kg Titrated 4/9/2017 4/13/2017    Sig - Route: Infuse 16.1-120.75 mcg/hr into a venous catheter Dose Adjusted By Provider As Needed. - Intravenous    dextrose (D5W) 5 % infusion (Discontinued) 50 mL/hr Continuous 4/11/2017 4/14/2017    Sig - Route: Infuse 50 mL/hr into a venous catheter Continuous. - Intravenous    diltiaZEM (CARDIZEM) 100 mg/100 mL 0.9% NS (Discontinued) 5-15 mg/hr Titrated 4/5/2017 4/5/2017    Sig - Route: Infuse 5-15 mg/hr into a venous catheter Dose Adjusted By Provider As Needed. - Intravenous    diltiazem (CARDIZEM) bolus from bag 1 mg/mL 10 mg (Discontinued) 10 mg Once 4/5/2017 4/5/2017    Sig - Route: Infuse 10 mg into a venous catheter 1 (One) Time. - Intravenous    enoxaparin (LOVENOX) syringe 40 mg (Discontinued) 40 mg Daily 3/31/2017 4/1/2017    Sig - Route: Inject 0.4 mL under the skin Daily. - Subcutaneous    enoxaparin (LOVENOX) syringe 40 mg (Discontinued) 40 mg Nightly 4/1/2017 4/8/2017    Sig - Route: Inject 0.4 " mL under the skin Every Night. - Subcutaneous    famotidine (PEPCID) injection 20 mg (Discontinued) 20 mg 2 Times Daily 3/31/2017 4/2/2017    Sig - Route: Infuse 2 mL into a venous catheter 2 (Two) Times a Day. - Intravenous    FentaNYL Citrate (PF) (SUBLIMAZE) injection 100 mcg (Discontinued) 100 mcg Every 1 Hour PRN 4/9/2017 4/12/2017    Sig - Route: Infuse 2 mL into a venous catheter Every 1 (One) Hour As Needed for Severe Pain (7-10). - Intravenous    FentaNYL Citrate (PF) (SUBLIMAZE) injection 200 mcg (Discontinued) 200 mcg Once 4/8/2017 4/8/2017    Sig - Route: Infuse 4 mL into a venous catheter 1 (One) Time. - Intravenous    FentaNYL Citrate (PF) (SUBLIMAZE) injection 25 mcg (Discontinued) 25 mcg Every 30 Minutes PRN 3/31/2017 4/8/2017    Sig - Route: Infuse 0.5 mL into a venous catheter Every 30 (Thirty) Minutes As Needed for Severe Pain (7-10) (NRS greater than or equal to 6 or CPOT greater than or equal to 3). - Intravenous    FentaNYL Citrate (PF) (SUBLIMAZE) injection 50 mcg (Discontinued) 50 mcg Every 30 Minutes PRN 3/31/2017 3/31/2017    Sig - Route: Infuse 1 mL into a venous catheter Every 30 (Thirty) Minutes As Needed for Severe Pain (7-10). - Intravenous    FentaNYL Citrate (PF) (SUBLIMAZE) injection 50 mcg (Discontinued) 50 mcg Every 30 Minutes PRN 3/31/2017 4/8/2017    Sig - Route: Infuse 1 mL into a venous catheter Every 30 (Thirty) Minutes As Needed (Moderate to Severe pain). - Intravenous    FentaNYL Citrate (PF) (SUBLIMAZE) injection 50 mcg (Discontinued) 50 mcg Every 1 Hour PRN 4/12/2017 4/13/2017    Sig - Route: Infuse 1 mL into a venous catheter Every 1 (One) Hour As Needed for Severe Pain (7-10). - Intravenous    FentaNYL Citrate (PF) (SUBLIMAZE) injection 75 mcg (Discontinued) 75 mcg Every 30 Minutes PRN 3/31/2017 4/7/2017    Sig - Route: Infuse 1.5 mL into a venous catheter Every 30 (Thirty) Minutes As Needed for Severe Pain (7-10). - Intravenous    hydrALAZINE (APRESOLINE) injection 20  mg (Discontinued) 20 mg Every 6 Hours PRN 4/5/2017 4/8/2017    Sig - Route: Infuse 1 mL into a venous catheter Every 6 (Six) Hours As Needed for High Blood Pressure. - Intravenous    hydrALAZINE (APRESOLINE) injection 20 mg (Discontinued) 20 mg Every 6 Hours PRN 4/8/2017 4/9/2017    Sig - Route: Infuse 1 mL into a venous catheter Every 6 (Six) Hours As Needed for High Blood Pressure. - Intravenous    HYDROcodone-acetaminophen (NORCO) 7.5-325 MG per tablet 2 tablet (Discontinued) 2 tablet Every 6 Hours PRN 4/5/2017 4/7/2017    Sig - Route: Take 2 tablets by mouth Every 6 (Six) Hours As Needed for Moderate Pain (4-6). - Oral    HYDROmorphone (DILAUDID) injection 1 mg (Discontinued) 1 mg Once 4/5/2017 4/7/2017    Sig - Route: Infuse 1 mg into a venous catheter 1 (One) Time. - Intravenous    labetalol (NORMODYNE,TRANDATE) injection 20 mg (Discontinued) 20 mg Every 8 Hours PRN 4/6/2017 4/8/2017    Sig - Route: Infuse 4 mL into a venous catheter Every 8 (Eight) Hours As Needed for High Blood Pressure. - Intravenous    labetalol (NORMODYNE,TRANDATE) injection 20 mg (Discontinued) 20 mg Every 8 Hours PRN 4/8/2017 4/10/2017    Sig - Route: Infuse 4 mL into a venous catheter Every 8 (Eight) Hours As Needed for High Blood Pressure. - Intravenous    lactated ringers infusion (Discontinued) 125 mL/hr Continuous 3/31/2017 4/2/2017    Sig - Route: Infuse 125 mL/hr into a venous catheter Continuous. - Intravenous    lansoprazole (FIRST) oral suspension 30 mg (Discontinued) 30 mg Every Morning 4/13/2017 4/13/2017    Sig - Route: 10 mL by Nasogastric route Every Morning. - Nasogastric    Reason for Discontinue: Allergic response    LORazepam (ATIVAN) injection 0.5 mg (Discontinued) 0.5 mg Every 6 Hours PRN 4/9/2017 4/14/2017    Sig - Route: Infuse 0.25 mL into a venous catheter Every 6 (Six) Hours As Needed for Anxiety. - Intravenous    LORazepam (ATIVAN) injection 1 mg (Discontinued) 1 mg Every 20 Minutes PRN 4/8/2017 4/9/2017     "Sig - Route: Infuse 0.5 mL into a venous catheter Every 20 (Twenty) Minutes As Needed for Anxiety. - Intravenous    Magnesium Sulfate 2 gram Bolus, followed by 8 gram infusion (total Mg dose 10 grams)- Mg less than or equal to 1mg/dL (Discontinued) 2 g As Needed 3/31/2017 4/8/2017    Sig - Route: Infuse 50 mL into a venous catheter As Needed (Mg less than or equal to 1mg/dL). - Intravenous    Linked Group 3:  \"Or\" Linked Group Details        magnesium sulfate 4 gram infusion- Mg 1.6-1.9 mg/dL (Discontinued) 4 g As Needed 3/31/2017 4/8/2017    Sig - Route: Infuse 100 mL into a venous catheter As Needed (Mg 1.6-1.9 mg/dL). - Intravenous    Linked Group 3:  \"Or\" Linked Group Details        Magnesium Sulfate 6 gram Infusion (2 gm x 3) -Mg 1.1 -1.5 mg/dL (Discontinued) 2 g As Needed 3/31/2017 4/8/2017    Sig - Route: Infuse 50 mL into a venous catheter As Needed (Mg 1.1 -1.5 mg/dL). - Intravenous    Linked Group 3:  \"Or\" Linked Group Details        methylPREDNISolone sodium succinate (SOLU-Medrol) injection 20 mg (Discontinued) 20 mg Every 12 Hours 4/9/2017 4/12/2017    Sig - Route: Infuse 0.5 mL into a venous catheter Every 12 (Twelve) Hours. - Intravenous    metoprolol (LOPRESSOR) injection 2.5 mg (Discontinued) 2.5 mg Every 6 Hours 4/6/2017 4/7/2017    Sig - Route: Infuse 2.5 mL into a venous catheter Every 6 (Six) Hours. - Intravenous    metoprolol (LOPRESSOR) injection 5 mg (Discontinued) 5 mg Every 5 Minutes PRN 4/5/2017 4/6/2017    Sig - Route: Infuse 5 mL into a venous catheter Every 5 (Five) Minutes As Needed for Heart Rate (PSVT). - Intravenous    metoprolol tartrate (LOPRESSOR) tablet 12.5 mg (Discontinued) 12.5 mg Every 12 Hours Scheduled 4/13/2017 4/13/2017    Sig - Route: Take 0.5 tablets by mouth Every 12 (Twelve) Hours. - Oral    metoprolol tartrate (LOPRESSOR) tablet 50 mg (Discontinued) 50 mg Every 12 Hours Scheduled 4/7/2017 4/8/2017    Sig - Route: 1 tablet by Nasogastric route Every 12 (Twelve) " Hours. - Nasogastric    morphine injection 4 mg (Discontinued) 4 mg Every 20 Minutes PRN 4/8/2017 4/10/2017    Sig - Route: Infuse 1 mL into a venous catheter Every 20 (Twenty) Minutes As Needed for Moderate Pain (4-6) or Severe Pain (7-10) (Shortness of breath and anxiety). - Intravenous    Morphine injection 6 mg (Discontinued) 6 mg Every 2 Hours PRN 4/10/2017 4/11/2017    Sig - Route: Infuse 0.6 mL into a venous catheter Every 2 (Two) Hours As Needed for Moderate Pain (4-6) or Severe Pain (7-10) (Shortness of breath and anxiety). - Intravenous    Reason for Discontinue: Reorder    Morphine sulfate (PF) injection 4 mg (Discontinued) 4 mg Every 2 Hours PRN 4/13/2017 4/14/2017    Sig - Route: Infuse 2 mL into a venous catheter Every 2 (Two) Hours As Needed for Moderate Pain (4-6) or Severe Pain (7-10) (shortness of breath or anxiety). - Intravenous    Morphine sulfate (PF) injection 6 mg (Discontinued) 6 mg Every 2 Hours PRN 4/11/2017 4/13/2017    Sig - Route: Infuse 3 mL into a venous catheter Every 2 (Two) Hours As Needed for Moderate Pain (4-6) or Severe Pain (7-10) (shortness of breath or anxiety). - Intravenous    niCARdipine (CARDENE) 25 mg/250 mL 0.9% NS VTB (mbp) (Discontinued) 5-15 mg/hr Titrated 4/7/2017 4/8/2017    Sig - Route: Infuse 5-15 mg/hr into a venous catheter Dose Adjusted By Provider As Needed. - Intravenous    niCARdipine (CARDENE) 25 mg/250 mL 0.9% NS VTB (mbp) (Discontinued) 5-15 mg/hr Titrated 4/8/2017 4/13/2017    Sig - Route: Infuse 5-15 mg/hr into a venous catheter Dose Adjusted By Provider As Needed. - Intravenous    norepinephrine (LEVOPHED) 32 mcg/mL (8 mg/250 mL) in sodium chloride 0.9% infusion (premix) (Discontinued) 0.02-0.3 mcg/kg/min × 74.8 kg Titrated 3/31/2017 4/6/2017    Sig - Route: Infuse 1.496-22.44 mcg/min into a venous catheter Dose Adjusted By Provider As Needed. - Intravenous    ondansetron (ZOFRAN) injection 4 mg (Discontinued) 4 mg Every 4 Hours PRN 4/5/2017 4/8/2017     Sig - Route: Infuse 2 mL into a venous catheter Every 4 (Four) Hours As Needed for Nausea or Vomiting. - Intravenous    pantoprazole (PROTONIX) injection 40 mg (Discontinued) 40 mg Every Early Morning 4/2/2017 4/6/2017    Sig - Route: Infuse 10 mL into a venous catheter Every Morning. - Intravenous    Reason for Discontinue: Therapy completed    pantoprazole (PROTONIX) injection 40 mg (Discontinued) 40 mg Every Early Morning 4/10/2017 4/12/2017    Sig - Route: Infuse 10 mL into a venous catheter Every Morning. - Intravenous    Pharmacy Consult (Discontinued)  Continuous PRN 4/5/2017 4/5/2017    Sig - Route: Continuous As Needed for Consult (For narcotic ileus). - Does not apply    Reason for Discontinue: Error    Pharmacy to dose vancomycin (Discontinued)  Continuous PRN 3/31/2017 4/5/2017    Sig - Route: Continuous As Needed for Consult. - Does not apply    phenylephrine (JAYANT-SYNEPHRINE) 0.2 mg/mL in sodium chloride 0.9 % 250 mL infusion (Discontinued) 0.5-3 mcg/kg/min × 74.8 kg Titrated 3/31/2017 4/6/2017    Sig - Route: Infuse 37.4-224.4 mcg/min into a venous catheter Dose Adjusted By Provider As Needed. - Intravenous    phenylephrine (JAYANT-SYNEPHRINE) 100 MCG/ML injection for priapism 100 mcg (Discontinued) 1 mL Once 4/9/2017 4/9/2017    Sig - Route: 1 mL by Intracavernosal route 1 (One) Time. - Intracavernosal    phenylephrine (JAYANT-SYNEPHRINE) 50 mg in sodium chloride 0.9 % 250 mL infusion (Discontinued) 0.5-3 mcg/kg/min × 74.8 kg Titrated 3/31/2017 3/31/2017    Sig - Route: Infuse 37.4-224.4 mcg/min into a venous catheter Dose Adjusted By Provider As Needed. - Intravenous    piperacillin-tazobactam (ZOSYN) 3.375 g in iso-osmotic dextrose 50 ml (premix) (Discontinued) 3.375 g Every 8 Hours 4/4/2017 4/5/2017    Sig - Route: Infuse 50 mL into a venous catheter Every 8 (Eight) Hours. - Intravenous    piperacillin-tazobactam (ZOSYN) 4.5 g in iso-osmotic dextrose 100 mL IVPB (premix) (Discontinued) 4.5 g Every 6  "Hours 3/31/2017 3/31/2017    Sig - Route: Infuse 100 mL into a venous catheter Every 6 (Six) Hours. - Intravenous    Reason for Discontinue: Error    Linked Group 2:  \"And\" Linked Group Details        piperacillin-tazobactam (ZOSYN) 4.5 g in iso-osmotic dextrose 100 mL IVPB (premix) (Discontinued) 4.5 g Every 8 Hours 3/31/2017 4/4/2017    Sig - Route: Infuse 100 mL into a venous catheter Every 8 (Eight) Hours. - Intravenous    potassium chloride (KLOR-CON) packet 40 mEq (Discontinued) 40 mEq As Needed 3/31/2017 4/8/2017    Sig - Route: Take 40 mEq by mouth As Needed (potassium replacement, see admin instructions). - Oral    Linked Group 4:  \"Or\" Linked Group Details        potassium chloride (KLOR-CON) packet 40 mEq (Discontinued) 40 mEq As Needed 4/3/2017 4/3/2017    Sig - Route: Take 40 mEq by mouth As Needed (potassium replacement, see admin instructions). - Oral    Cosign for Ordering: Accepted by Zaida Montilla MD on 4/5/2017  5:24 AM    Linked Group 5:  \"Or\" Linked Group Details        potassium chloride (KLOR-CON) packet 40 mEq (Discontinued) 40 mEq As Needed 4/8/2017 4/8/2017    Sig - Route: Take 40 mEq by mouth As Needed (potassium replacement, see admin instructions). - Oral    Cosign for Ordering: Accepted by Gerard Xie MD on 4/8/2017  8:53 AM    Linked Group 6:  \"Or\" Linked Group Details        potassium chloride (KLOR-CON) packet 40 mEq (Discontinued) 40 mEq As Needed 4/14/2017 4/14/2017    Sig - Route: Take 40 mEq by mouth As Needed (potassium replacement, see admin instructions). - Oral    Reason for Discontinue: Formulary change    Cosign for Ordering: Accepted by Gerard Xie MD on 4/14/2017  4:40 PM    Linked Group 7:  \"Or\" Linked Group Details        potassium chloride (MICRO-K) CR capsule 40 mEq (Discontinued) 40 mEq As Needed 3/31/2017 4/8/2017    Sig - Route: Take 4 capsules by mouth As Needed (potassium replacement.  see admin instructions). - Oral    Linked Group 4:  \"Or\" Linked Group " "Details        potassium chloride (MICRO-K) CR capsule 40 mEq (Discontinued) 40 mEq As Needed 4/3/2017 4/3/2017    Sig - Route: Take 4 capsules by mouth As Needed (potassium replacement.  see admin instructions). - Oral    Cosign for Ordering: Accepted by Zaida Montilla MD on 4/5/2017  5:24 AM    Linked Group 5:  \"Or\" Linked Group Details        potassium chloride (MICRO-K) CR capsule 40 mEq (Discontinued) 40 mEq As Needed 4/8/2017 4/8/2017    Sig - Route: Take 4 capsules by mouth As Needed (potassium replacement.  see admin instructions). - Oral    Cosign for Ordering: Accepted by Gerard Xie MD on 4/8/2017  8:53 AM    Linked Group 6:  \"Or\" Linked Group Details        potassium chloride (MICRO-K) CR capsule 40 mEq (Discontinued) 40 mEq As Needed 4/14/2017 4/14/2017    Sig - Route: Take 4 capsules by mouth As Needed (potassium replacement.  see admin instructions). - Oral    Reason for Discontinue: Reorder    Cosign for Ordering: Accepted by Gerard Xie MD on 4/14/2017  4:40 PM    Linked Group 7:  \"Or\" Linked Group Details        potassium chloride 10 mEq in 100 mL IVPB (Discontinued) 10 mEq Every 1 Hour PRN 3/31/2017 4/8/2017    Sig - Route: Infuse 100 mL into a venous catheter Every 1 (One) Hour As Needed (potassium protocol PERIPHERAL - see admin instructions). - Intravenous    Linked Group 4:  \"Or\" Linked Group Details        potassium chloride 10 mEq in 100 mL IVPB (Discontinued) 10 mEq Every 1 Hour PRN 4/3/2017 4/3/2017    Sig - Route: Infuse 100 mL into a venous catheter Every 1 (One) Hour As Needed (potassium protocol PERIPHERAL - see admin instructions). - Intravenous    Cosign for Ordering: Accepted by Zaida Montilla MD on 4/5/2017  5:24 AM    Linked Group 5:  \"Or\" Linked Group Details        potassium chloride 10 mEq in 100 mL IVPB (Discontinued) 10 mEq Every 1 Hour PRN 4/14/2017 4/14/2017    Sig - Route: Infuse 100 mL into a venous catheter Every 1 (One) Hour As Needed (potassium protocol " "PERIPHERAL - see admin instructions). - Intravenous    Reason for Discontinue: Reorder    Cosign for Ordering: Accepted by Gerard Xie MD on 4/14/2017  4:40 PM    Linked Group 7:  \"Or\" Linked Group Details        potassium chloride 20 mEq in 50 mL IVPB (Discontinued) 20 mEq Every 1 Hour PRN 4/8/2017 4/8/2017    Sig - Route: Infuse 50 mL into a venous catheter Every 1 (One) Hour As Needed (potassium protocol CENTRAL IV - see admin instructions). - Intravenous    Cosign for Ordering: Accepted by Gerard Xie MD on 4/8/2017  8:53 AM    Linked Group 6:  \"Or\" Linked Group Details        potassium chloride 20 mEq in 50 mL IVPB (Discontinued) 20 mEq Every 1 Hour PRN 4/8/2017 4/14/2017    Sig - Route: Infuse 50 mL into a venous catheter Every 1 (One) Hour As Needed (See admin Instructions.). - Intravenous    potassium phosphate 15 mmol in sodium chloride 0.9 % 100 mL infusion (Discontinued) 15 mmol As Needed 3/31/2017 4/8/2017    Sig - Route: Infuse 15 mmol into a venous catheter As Needed (Phosphorus replacement - see admin instructions). - Intravenous    Linked Group 8:  \"Or\" Linked Group Details        potassium phosphate 30 mmol in sodium chloride 0.9 % 100 mL infusion (Discontinued) 30 mmol As Needed 3/31/2017 4/8/2017    Sig - Route: Infuse 30 mmol into a venous catheter As Needed (Phosphorus replacement - see admin instructions). - Intravenous    Linked Group 8:  \"Or\" Linked Group Details        potassium phosphate 45 mmol in sodium chloride 0.9 % 250 mL infusion (Discontinued) 45 mmol Once As Needed 3/31/2017 4/8/2017    Sig - Route: Infuse 45 mmol into a venous catheter 1 (One) Time As Needed (per protocol in admin instructions). - Intravenous    Linked Group 8:  \"Or\" Linked Group Details        predniSONE (DELTASONE) tablet 10 mg (Discontinued) 10 mg Daily 4/14/2017 4/14/2017    Sig - Route: Take 1 tablet by mouth Daily. - Oral    predniSONE (DELTASONE) tablet 20 mg (Discontinued) 20 mg Daily 4/12/2017 " 4/13/2017    Sig - Route: Take 1 tablet by mouth Daily. - Oral    propofol (DIPRIVAN) infusion 10 mg/mL 100 mL (Discontinued) 5-50 mcg/kg/min × 74.8 kg Titrated 3/31/2017 4/5/2017    Sig - Route: Infuse 374-3,740 mcg/min into a venous catheter Dose Adjusted By Provider As Needed. - Intravenous    propofol (DIPRIVAN) infusion 10 mg/mL 100 mL (Discontinued) 5-50 mcg/kg/min × 80.5 kg Titrated 4/8/2017 4/8/2017    Sig - Route: Infuse 402.5-4,025 mcg/min into a venous catheter Dose Adjusted By Provider As Needed. - Intravenous    propofol (DIPRIVAN) infusion 10 mg/mL 100 mL (Discontinued) 5-50 mcg/kg/min × 80.5 kg Titrated 4/9/2017 4/10/2017    Sig - Route: Infuse 402.5-4,025 mcg/min into a venous catheter Dose Adjusted By Provider As Needed. - Intravenous    Reason for Discontinue: Duplicate order    propofol (DIPRIVAN) infusion 10 mg/mL 100 mL (Discontinued) 5-50 mcg/kg/min × 80.5 kg Titrated 4/9/2017 4/10/2017    Sig - Route: Infuse 402.5-4,025 mcg/min into a venous catheter Dose Adjusted By Provider As Needed. - Intravenous    sodium chloride 0.9 % bolus 30 mL/kg (Discontinued) 30 mL/kg Once 3/31/2017 3/31/2017    Sig - Route: Infuse 30 mL/kg into a venous catheter 1 (One) Time. - Intravenous    Reason for Discontinue: Duplicate order    sodium chloride 0.9 % flush 1-10 mL (Discontinued) 1-10 mL As Needed 3/31/2017 4/8/2017    Sig - Route: Infuse 1-10 mL into a venous catheter As Needed for Line Care. - Intravenous    sodium chloride 0.9 % flush 10 mL (Discontinued) 10 mL As Needed 3/31/2017 4/8/2017    Sig - Route: Infuse 10 mL into a venous catheter As Needed for Line Care. - Intravenous    Cosign for Ordering: Accepted by Renny Polanco MD on 3/31/2017 12:08 PM    sodium chloride 0.9 % infusion (Discontinued) 9 mL/hr Continuous 4/6/2017 4/14/2017    Sig - Route: Infuse 9 mL/hr into a venous catheter Continuous. - Intravenous    Cosign for Ordering: Accepted by Gerard Xie MD on 4/6/2017  5:35 PM    sodium  "phosphate 15 mmol in sodium chloride 0.9 % 100 mL IVPB (Discontinued) 15 mmol As Needed 3/31/2017 4/6/2017    Sig - Route: Infuse 15 mmol into a venous catheter As Needed (Phosphorus replacement - see admin instructions). - Intravenous    Reason for Discontinue: Duplicate order    Linked Group 8:  \"Or\" Linked Group Details        sodium phosphate 30 mmol in sodium chloride 0.9 % 100 mL IVPB (Discontinued) 30 mmol As Needed 3/31/2017 4/6/2017    Sig - Route: Infuse 30 mmol into a venous catheter As Needed (Phosphorus replacement - see admin instructions). - Intravenous    Reason for Discontinue: Duplicate order    Linked Group 8:  \"Or\" Linked Group Details        sodium phosphate 45 mmol in sodium chloride 0.9 % 250 mL IVPB (Discontinued) 45 mmol As Needed 3/31/2017 4/6/2017    Sig - Route: Infuse 45 mmol into a venous catheter As Needed (Phosphorus replacement - see admin instructions). - Intravenous    Reason for Discontinue: Duplicate order    Linked Group 8:  \"Or\" Linked Group Details        vancomycin (VANCOCIN) in iso-osmotic dextrose IVPB 1 g (premix) 200 mL (Discontinued) 1,000 mg Every 12 Hours 4/2/2017 4/4/2017    Sig - Route: Infuse 200 mL into a venous catheter Every 12 (Twelve) Hours. - Intravenous    vancomycin 1250 mg/250 mL 0.9% NS IVPB ([facility]) (Discontinued) 1,250 mg Every 24 Hours 4/1/2017 4/2/2017    Sig - Route: Infuse 250 mL into a venous catheter Daily. - Intravenous    Reason for Discontinue: Dose adjustment    vancomycin 1250 mg/250 mL 0.9% NS IVPB ([facility]) (Discontinued) 1,250 mg Every 12 Hours 4/4/2017 4/5/2017    Sig - Route: Infuse 250 mL into a venous catheter Every 12 (Twelve) Hours. - Intravenous    vasopressin (PITRESSIN) 20 Units in sodium chloride 0.9 % 100 mL infusion (Discontinued) 0.03 Units/min Titrated 3/31/2017 4/5/2017    Sig - Route: Infuse 0.03 Units/min into a venous catheter Dose Adjusted By Provider As Needed. - Intravenous          Blood Administration Record     None    "     Lab Results (last 72 hours)     Procedure Component Value Units Date/Time    Potassium [[id number]]  (Abnormal) Collected:  04/14/17 1740    Specimen:  Blood Updated:  04/14/17 1822     Potassium 3.4 (L) mmol/L     CBC & Differential [[id number]] Collected:  04/15/17 0602    Specimen:  Blood Updated:  04/15/17 0627    Narrative:       The following orders were created for panel order CBC & Differential.  Procedure                               Abnormality         Status                     ---------                               -----------         ------                     CBC Auto Differential[[id number]]         Abnormal            Final result                 Please view results for these tests on the individual orders.    CBC Auto Differential [[id number]]  (Abnormal) Collected:  04/15/17 0602    Specimen:  Blood Updated:  04/15/17 0627     WBC 8.86 10*3/mm3      RBC 4.06 10*6/mm3      Hemoglobin 10.7 (L) g/dL      Hematocrit 36.0 %      MCV 88.7 fL      MCH 26.4 (L) pg      MCHC 29.7 (L) g/dL      RDW 14.8 (H) %      RDW-SD 47.2 fl      MPV 11.5 fL      Platelets 229 10*3/mm3      Neutrophil % 74.5 %      Lymphocyte % 17.4 (L) %      Monocyte % 5.3 %      Eosinophil % 2.5 %      Basophil % 0.0 %      Immature Grans % 0.3 %      Neutrophils, Absolute 6.60 10*3/mm3      Lymphocytes, Absolute 1.54 10*3/mm3      Monocytes, Absolute 0.47 10*3/mm3      Eosinophils, Absolute 0.22 10*3/mm3      Basophils, Absolute 0.00 10*3/mm3      Immature Grans, Absolute 0.03 10*3/mm3     Comprehensive Metabolic Panel [[id number]]  (Abnormal) Collected:  04/15/17 0602    Specimen:  Blood Updated:  04/15/17 0649     Glucose 102 (H) mg/dL      BUN 10 mg/dL      Creatinine 0.56 (L) mg/dL      Sodium 144 mmol/L      Potassium 4.1 mmol/L      Chloride 99 mmol/L      CO2 37.6 (H) mmol/L      Calcium 8.7 mg/dL      Total Protein 6.4 g/dL      Albumin 3.10 (L) g/dL      ALT (SGPT) 40 (H) U/L      AST (SGOT) 21 U/L      Alkaline Phosphatase 80  U/L      Total Bilirubin 0.4 mg/dL      eGFR Non African Amer 109 mL/min/1.73      Globulin 3.3 gm/dL      A/G Ratio 0.9 g/dL      BUN/Creatinine Ratio 17.9     Anion Gap 7.4 mmol/L     Magnesium [[id number]]  (Normal) Collected:  04/15/17 0602    Specimen:  Blood Updated:  04/15/17 0649     Magnesium 1.9 mg/dL     CBC & Differential [[id number]] Collected:  04/16/17 0653    Specimen:  Blood Updated:  04/16/17 0740    Narrative:       The following orders were created for panel order CBC & Differential.  Procedure                               Abnormality         Status                     ---------                               -----------         ------                     CBC Auto Differential[[id number]]         Abnormal            Final result                 Please view results for these tests on the individual orders.    CBC Auto Differential [[id number]]  (Abnormal) Collected:  04/16/17 0653    Specimen:  Blood Updated:  04/16/17 0740     WBC 7.78 10*3/mm3      RBC 4.06 10*6/mm3      Hemoglobin 10.7 (L) g/dL      Hematocrit 35.6 %      MCV 87.7 fL      MCH 26.4 (L) pg      MCHC 30.1 (L) g/dL      RDW 15.3 (H) %      RDW-SD 48.2 fl      MPV 11.3 fL      Platelets 243 10*3/mm3      Neutrophil % 70.0 %      Lymphocyte % 15.6 (L) %      Monocyte % 10.7 %      Eosinophil % 3.2 %      Basophil % 0.1 %      Immature Grans % 0.4 %      Neutrophils, Absolute 5.45 10*3/mm3      Lymphocytes, Absolute 1.21 10*3/mm3      Monocytes, Absolute 0.83 10*3/mm3      Eosinophils, Absolute 0.25 10*3/mm3      Basophils, Absolute 0.01 10*3/mm3      Immature Grans, Absolute 0.03 10*3/mm3     Comprehensive Metabolic Panel [[id number]]  (Abnormal) Collected:  04/16/17 0653    Specimen:  Blood Updated:  04/16/17 0758     Glucose 137 (H) mg/dL      BUN 8 mg/dL      Creatinine 0.55 (L) mg/dL      Sodium 139 mmol/L      Potassium 3.4 (L) mmol/L      Chloride 96 (L) mmol/L      CO2 34.5 (H) mmol/L      Calcium 8.8 mg/dL      Total Protein 6.7 g/dL       Albumin 3.40 (L) g/dL      ALT (SGPT) 45 (H) U/L      AST (SGOT) 31 U/L      Alkaline Phosphatase 81 U/L      Total Bilirubin 0.5 mg/dL      eGFR Non African Amer 111 mL/min/1.73      Globulin 3.3 gm/dL      A/G Ratio 1.0 g/dL      BUN/Creatinine Ratio 14.5     Anion Gap 8.5 mmol/L     Potassium [[id number]]  (Normal) Collected:  04/16/17 2339    Specimen:  Blood Updated:  04/17/17 0030     Potassium 4.5 mmol/L     CBC & Differential [[id number]] Collected:  04/17/17 0750    Specimen:  Blood Updated:  04/17/17 0800    Narrative:       The following orders were created for panel order CBC & Differential.  Procedure                               Abnormality         Status                     ---------                               -----------         ------                     CBC Auto Differential[[id number]]                             In process                   Please view results for these tests on the individual orders.    CBC Auto Differential [[id number]] Collected:  04/17/17 0750    Specimen:  Blood Updated:  04/17/17 0800    Comprehensive Metabolic Panel [[id number]] Collected:  04/17/17 0750    Specimen:  Blood Updated:  04/17/17 0801          Imaging Results (last 72 hours)     Procedure Component Value Units Date/Time    XR Chest PA & Lateral [[id number]] Collected:  04/14/17 1223     Updated:  04/14/17 1227    Narrative:       XR CHEST PA AND LATERAL-     HISTORY: Female who is 66 years-old,  pneumonia     TECHNIQUE: Frontal and lateral views of the chest     COMPARISON: 04/11/2017     FINDINGS: Interval removal of previous endotracheal tube, right IJ  catheter, NG tube. The heart is enlarged. Aorta is tortuous. The  pulmonary vasculature is congested. Bilateral basilar  atelectasis/infiltrate, pleural effusions are seen, partially improved  on the right. No pneumothorax. Otherwise stable.       Impression:       Partial interval improvement, continued follow-up suggested.     This report was finalized on  4/14/2017 12:24 PM by Dr. Eric Beatty MD.       XR Abdomen KUB [[id number]] Collected:  04/14/17 1225     Updated:  04/14/17 1229    Narrative:       XR ABDOMEN KUB-     INDICATIONS: Possible ileus     TECHNIQUE: SUPINE VIEWS OF THE ABDOMEN     COMPARISON: 04/05/2017.     FINDINGS:     Previous NG tube is no longer present. Previous gaseous distention of  the stomach has conspicuously diminished.      The bowel gas pattern is nonobstructive. Free intraperitoneal gas  cannot be excluded on a supine radiograph. No definite urolithiasis. If  there is further clinical concern, CT can be obtained for further  examination.     The heart is enlarged. Pulmonary vasculature is congested. Bilateral  basilar pulmonary atelectasis/infiltrate/effusion is apparent.       Impression:          As described.     This report was finalized on 4/14/2017 12:26 PM by Dr. Eric Beatty MD.       FL Video Swallow [[id number]] Collected:  04/14/17 1620     Updated:  04/14/17 1624    Narrative:       BARIUM SWALLOW WITH VIDEO     CLINICAL HISTORY:   Female who is 66 years-old, with dysphagia.     TECHNIQUE: The swallowing mechanism was evaluated with real-time  fluoroscopic imaging, captured on digital disk and performed in  conjunction with speech pathologist (please see report).     FINDINGS: Swallowing mechanism is within functional limits       Impression:       No increased risk of aspiration.     FLUOROSCOPY TIME: 1 minute 42 seconds, single image      This report was finalized on 4/14/2017 4:21 PM by Dr. Francisco J Hayes MD.             ECG/EMG Results (last 72 hours)     ** No results found for the last 72 hours. **        Orders (last 72 hrs)     Start     Ordered    04/17/17 0600  CBC Auto Differential  PROCEDURE ONCE      04/17/17 0000    04/16/17 2340  Potassium  Timed      04/16/17 1940    04/16/17 1529  CMS Certification  Once      04/16/17 1529    04/16/17 0600  CBC Auto Differential  PROCEDURE ONCE      04/16/17 0000     04/15/17 0600  CBC Auto Differential  PROCEDURE ONCE      04/15/17 0000    04/15/17 0600  Potassium  Timed,   Status:  Canceled      04/15/17 0327    04/15/17 0600  Magnesium  Timed      04/15/17 0327    04/14/17 2227  potassium chloride (MICRO-K) CR capsule 40 mEq  As Needed      04/14/17 2228    04/14/17 2227  potassium chloride (KLOR-CON) packet 40 mEq  As Needed      04/14/17 2228    04/14/17 2227  potassium chloride 10 mEq in 100 mL IVPB  Every 1 Hour PRN      04/14/17 2228    04/14/17 1515  Potassium  Once      04/14/17 1114    04/14/17 1452  Diet Regular; Thin; Cardiac  Diet Effective Now      04/14/17 1451    04/14/17 1400  FL Video Swallow  1 Time Imaging      04/14/17 0701    04/14/17 1128  XR Abdomen KUB  1 Time Imaging      04/14/17 1128    04/14/17 1114  potassium chloride (MICRO-K) CR capsule 40 mEq  As Needed,   Status:  Discontinued      04/14/17 1114    04/14/17 1114  potassium chloride (KLOR-CON) packet 40 mEq  As Needed,   Status:  Discontinued      04/14/17 1114    04/14/17 1114  potassium chloride 10 mEq in 100 mL IVPB  Every 1 Hour PRN,   Status:  Discontinued      04/14/17 1114    04/14/17 1009  D / C Central Line  Once     Comments:  After potassium is infused    04/14/17 1011    04/14/17 0945  XR Chest PA & Lateral  1 Time Imaging      04/14/17 0944    04/14/17 0943  Morphine sulfate (PF) injection 3 mg  Every 2 Hours PRN      04/14/17 0943    04/14/17 0900  predniSONE (DELTASONE) tablet 10 mg  Daily,   Status:  Discontinued      04/13/17 0930    04/14/17 0845  Inpatient Consult to Rehab Admission  Once     Provider:  (Not yet assigned)    04/14/17 0845    04/14/17 0300  ondansetron (ZOFRAN) injection 4 mg  Every 4 Hours PRN      04/14/17 0300    04/13/17 1012  Morphine sulfate (PF) injection 4 mg  Every 2 Hours PRN,   Status:  Discontinued      04/13/17 1014    04/13/17 0900  hydrALAZINE (APRESOLINE) tablet 25 mg  Every 12 Hours Scheduled      04/13/17 0725    04/13/17 0800  Weaning  Trial per protocol  Every Morning,   Status:  Canceled      04/12/17 1051    04/12/17 1045  bisoprolol-hydrochlorothiazide (ZIAC) 2.5-6.25 MG per tablet 2 tablet  Every 24 Hours Scheduled      04/12/17 1009    04/11/17 1130  dextrose (D5W) 5 % infusion  Continuous,   Status:  Discontinued      04/11/17 1052    04/10/17 1530  amLODIPine (NORVASC) tablet 10 mg  Every 24 Hours Scheduled      04/10/17 1451    04/09/17 1126  LORazepam (ATIVAN) injection 0.5 mg  Every 6 Hours PRN,   Status:  Discontinued      04/09/17 1126    04/08/17 2100  enoxaparin (LOVENOX) syringe 40 mg  Nightly      04/08/17 1844    04/08/17 1843  potassium chloride 20 mEq in 50 mL IVPB  Every 1 Hour PRN,   Status:  Discontinued      04/08/17 1844    04/06/17 1505  hydrocortisone-bacitracin-zinc oxide-nystatin (MAGIC BARRIER) cream 1 application  As Needed      04/06/17 1506    04/06/17 0800  Weaning Trial per protocol  Every Morning,   Status:  Canceled      04/05/17 0912    04/06/17 0511  sodium chloride 0.9 % infusion  Continuous,   Status:  Discontinued      04/06/17 0511    04/02/17 0726  CBC & Differential  Daily      04/02/17 0725    04/02/17 0726  Comprehensive Metabolic Panel  Daily      04/02/17 0725    03/31/17 1754  Suction Deep Oral  As Needed,   Status:  Canceled     Comments:  Subglottic suctioning must be provided q 6 hours to meet minimum requirements.    03/31/17 1825    Unscheduled  Oxygen Therapy- Nasal Cannula; 2 LPM; Titrate for spO2: equal to or greater than, 92%  As Needed      03/31/17 1205    Unscheduled  Potassium  As Needed     Comments:  For sudden ventricular dysrhythmias. Notify physician.    03/31/17 1825    Unscheduled  Magnesium  As Needed     Comments:  For ICU/CCU Standing Orders    03/31/17 1825    Unscheduled  Change Dressing to IV Site As Needed When Damp, Loose or Soiled  As Needed      04/01/17 1136    Unscheduled  Bladder Scan if Patient Unable to Void 4-6 Hours After Catheter Removal  As Needed       04/13/17 1014    Unscheduled  If Bladder Scan Volume is Less Than 350-500mL & Patient is Without Symptoms of Bladder Discomfort / Distention Monitor Every 1-2 Hours for Spontaneous Void  As Needed      04/13/17 1014    Unscheduled  Straight Cath Every 4-6 Hours As Needed If Patient is Unable to Void After 4-6 Hours, Bladder Scan Volume is Greater Than 350-500mL & Patient Has Symptoms of Bladder Discomfort / Distention  As Needed      04/13/17 1014    Unscheduled  Schedule / Prompt Voiding For Patients With Urinary Incontinence  As Needed      04/13/17 1014          Operative/Procedure Notes (last 72 hours) (Notes from 4/14/2017  8:07 AM through 4/17/2017  8:07 AM)     No notes of this type exist for this encounter.           Physician Progress Notes (last 72 hours) (Notes from 4/14/2017  8:07 AM through 4/17/2017  8:07 AM)      Gerard Xie MD at 4/14/2017  9:11 AM  Version 2 of 2           Dr. ADA Xie    [facility]    4/14/2017    Patient ID:  Name:  Lupe Becker  MRN:  [id number]  1950  66 y.o.  female            CC/Reason for visit: Follow-up for acute on chronic respiratory failure    HPI: Doing very well.  She has been off the ventilator for several days now.    Vitals:  Vitals:    04/14/17 0505 04/14/17 0600 04/14/17 0700 04/14/17 0727   BP:  140/63 155/66    BP Location:       Patient Position:       Pulse:  61 72    Resp:       Temp: 98.6 °F (37 °C)   98.3 °F (36.8 °C)   TempSrc: Oral   Oral   SpO2:  96% 94%    Weight:       Height:         FiO2 (%): 50 %     Body mass index is 33.84 kg/(m^2).    Intake/Output Summary (Last 24 hours) at 04/14/17 0941  Last data filed at 04/14/17 0529   Gross per 24 hour   Intake          1378.95 ml   Output             3200 ml   Net         -1821.05 ml       Exam:  GEN:  No distress, awake, alert, very pleasant.  Has pectus excavatum and very webbed and short neck  LUNGS: Distant and coarse breath sounds, but no wheezing, nonlabored  breathing  CV:  Normal S1S2, without murmur, no edema  ABD:  Non tender, no enlarged liver or masses  EXT:  No cyanosis or clubbing    Scheduled meds:    amLODIPine 10 mg Oral Q24H   bisoprolol-hydrochlorothiazide 2 tablet Oral Q24H   enoxaparin 40 mg Subcutaneous Nightly   hydrALAZINE 25 mg Oral Q12H   predniSONE 10 mg Oral Daily     IV meds:                        dextrose 50 mL/hr Last Rate: 50 mL/hr (04/11/17 1139)   sodium chloride 9 mL/hr Last Rate: 9 mL/hr (04/10/17 1140)       Data Review:   I reviewed the patient's medications and new clinical results.  Lab Results   Component Value Date    CALCIUM 8.6 04/14/2017    PHOS 3.8 04/11/2017     Results from last 7 days  Lab Units 04/14/17  0314 04/13/17  0435 04/12/17  0524   AST (SGOT) U/L 30 33* 23   SODIUM mmol/L 142 140 137   POTASSIUM mmol/L 2.6* 3.0* 3.7   CHLORIDE mmol/L 94* 95* 95*   TOTAL CO2 mmol/L 38.7* 33.5* 30.5*   BUN mg/dL 10 14 23   CREATININE mg/dL 0.52* 0.55* 0.52*   GLUCOSE mg/dL 101* 124* 163*   CALCIUM mg/dL 8.6 8.7 8.6   WBC 10*3/mm3 10.34 16.21* 16.72*   HEMOGLOBIN g/dL 10.7* 11.6* 10.9*   PLATELETS 10*3/mm3 242 311 280   ALT (SGPT) U/L 46* 45* 26           Results from last 7 days  Lab Units 04/12/17  0931   PH, ARTERIAL pH units 7.441   PO2 ART mm Hg 92.4   PCO2, ARTERIAL mm Hg 48.3*   HCO3 ART mmol/L 32.9*       ASSESSMENT:   Acute on chronic hypoxic and hypercapnic respiratory failure  Hypernatremia  Community acquired pneumonia  Left adrenal mass  Elevated liver enzymes  Sepsis  Paroxysmal SVT (supraventricular tachycardia)  Scoliosis  Hypertension    PLAN:  Continue advancing patient's mobility with physical therapy.    Her hypertension is also responding well to her new medications    Perform swallow study today so we can advance oral diet.    She has done very well.    Gerard Xie MD  4/14/2017     Electronically signed by Gerard Xie MD at 4/14/2017  9:43 AM      Gerard Xie MD at 4/14/2017  9:11 AM  Version 1 of 2            Dr. ADA Xie    [facility] CORONARY CARE    4/14/2017    Patient ID:  Name:  Lupe Becker  MRN:  [id number]  1950  66 y.o.  female            CC/Reason for visit: Follow-up for acute on chronic respiratory failure    HPI: Doing very well.  She has been off the ventilator for several days now.    Vitals:  Vitals:    04/14/17 0505 04/14/17 0600 04/14/17 0700 04/14/17 0727   BP:  140/63 155/66    BP Location:       Patient Position:       Pulse:  61 72    Resp:       Temp: 98.6 °F (37 °C)   98.3 °F (36.8 °C)   TempSrc: Oral   Oral   SpO2:  96% 94%    Weight:       Height:         FiO2 (%): 50 %     Body mass index is 33.84 kg/(m^2).    Intake/Output Summary (Last 24 hours) at 04/14/17 0941  Last data filed at 04/14/17 0529   Gross per 24 hour   Intake          1378.95 ml   Output             3200 ml   Net         -1821.05 ml       Exam:  GEN:  No distress, awake, alert, very pleasant.  Has pectus excavatum and very webbed and short neck  LUNGS: Distant and coarse breath sounds, but no wheezing, nonlabored breathing  CV:  Normal S1S2, without murmur, no edema  ABD:  Non tender, no enlarged liver or masses  EXT:  No cyanosis or clubbing    Scheduled meds:    amLODIPine 10 mg Oral Q24H   bisoprolol-hydrochlorothiazide 2 tablet Oral Q24H   enoxaparin 40 mg Subcutaneous Nightly   hydrALAZINE 25 mg Oral Q12H   predniSONE 10 mg Oral Daily     IV meds:                        dextrose 50 mL/hr Last Rate: 50 mL/hr (04/11/17 1139)   sodium chloride 9 mL/hr Last Rate: 9 mL/hr (04/10/17 0581)       Data Review:   I reviewed the patient's medications and new clinical results.  Lab Results   Component Value Date    CALCIUM 8.6 04/14/2017    PHOS 3.8 04/11/2017     Results from last 7 days  Lab Units 04/14/17  0314 04/13/17  0435 04/12/17  0524   AST (SGOT) U/L 30 33* 23   SODIUM mmol/L 142 140 137   POTASSIUM mmol/L 2.6* 3.0* 3.7   CHLORIDE mmol/L 94* 95* 95*   TOTAL CO2 mmol/L 38.7* 33.5* 30.5*   BUN  mg/dL 10 14 23   CREATININE mg/dL 0.52* 0.55* 0.52*   GLUCOSE mg/dL 101* 124* 163*   CALCIUM mg/dL 8.6 8.7 8.6   WBC 10*3/mm3 10.34 16.21* 16.72*   HEMOGLOBIN g/dL 10.7* 11.6* 10.9*   PLATELETS 10*3/mm3 242 311 280   ALT (SGPT) U/L 46* 45* 26           Results from last 7 days  Lab Units 04/12/17  0931   PH, ARTERIAL pH units 7.441   PO2 ART mm Hg 92.4   PCO2, ARTERIAL mm Hg 48.3*   HCO3 ART mmol/L 32.9*       ASSESSMENT:   Acute on chronic hypoxic and hypercapnic respiratory failure  Hypernatremia  Community acquired pneumonia  Left adrenal mass  Elevated liver enzymes  Sepsis  Paroxysmal SVT (supraventricular tachycardia)  Scoliosis    PLAN:  Continue advancing patient's mobility with physical therapy.    Perform swallow study today so we can advance oral diet.    She has done very well.    Gerard Xie MD  4/14/2017     Electronically signed by Gerard Xie MD at 4/14/2017  9:42 AM      Charles Garcia MD at 4/15/2017  9:17 AM  Version 1 of 1               [address]    4/15/2017    Patient ID:  Name:  Lupe Becker  MRN:  [id number]  1950  66 y.o.  female            CC/Reason for visit: Follow-up for acute on chronic respiratory failure    INTERVAL:  Doing well no acute events.  Still requiring oxygen.  Upright in bed coloring.  Feels generally weaker than prior to hospital stay.    Vitals:  Vitals:    04/14/17 1937 04/14/17 2313 04/15/17 0500 04/15/17 0800   BP: 137/61 141/66  152/77   BP Location: Left arm Left arm  Left arm   Patient Position: Lying Sitting  Sitting   Pulse: 70 77     Resp: 18 18  18   Temp: 98.1 °F (36.7 °C) 97.7 °F (36.5 °C)  98.2 °F (36.8 °C)   TempSrc: Oral Oral  Oral   SpO2: 96% 92%     Weight:   205 lb (93 kg)    Height:         FiO2 (%): 50 %     Body mass index is 41.4 kg/(m^2).    Intake/Output Summary (Last 24 hours) at 04/15/17 0918  Last data filed at 04/15/17 0630   Gross per 24 hour   Intake             2050 ml   Output                2 ml    Net             2048 ml       Exam:  GEN:  No distress, awake, alert, very pleasant.  Has pectus excavatum and very webbed and short neck  LUNGS: Distant and coarse breath sounds, but no wheezing, nonlabored breathing  CV:  Normal S1S2, without murmur, no edema  ABD:  Non tender, no enlarged liver or masses  EXT:  No cyanosis or clubbing    Scheduled meds:      amLODIPine 10 mg Oral Q24H   bisoprolol-hydrochlorothiazide 2 tablet Oral Q24H   enoxaparin 40 mg Subcutaneous Nightly   hydrALAZINE 25 mg Oral Q12H     IV meds:                           Data Review:   I reviewed the patient's medications and new clinical results.  Lab Results   Component Value Date    CALCIUM 8.7 04/15/2017    PHOS 3.8 04/11/2017       Results from last 7 days  Lab Units 04/15/17  0602 04/14/17  1740 04/14/17  0314 04/13/17  0435   AST (SGOT) U/L 21  --  30 33*   MAGNESIUM mg/dL 1.9  --   --   --    SODIUM mmol/L 144  --  142 140   POTASSIUM mmol/L 4.1 3.4* 2.6* 3.0*   CHLORIDE mmol/L 99  --  94* 95*   TOTAL CO2 mmol/L 37.6*  --  38.7* 33.5*   BUN mg/dL 10  --  10 14   CREATININE mg/dL 0.56*  --  0.52* 0.55*   GLUCOSE mg/dL 102*  --  101* 124*   CALCIUM mg/dL 8.7  --  8.6 8.7   WBC 10*3/mm3 8.86  --  10.34 16.21*   HEMOGLOBIN g/dL 10.7*  --  10.7* 11.6*   PLATELETS 10*3/mm3 229  --  242 311   ALT (SGPT) U/L 40*  --  46* 45*           Results from last 7 days  Lab Units 04/12/17  0931   PH, ARTERIAL pH units 7.441   PO2 ART mm Hg 92.4   PCO2, ARTERIAL mm Hg 48.3*   HCO3 ART mmol/L 32.9*       ASSESSMENT:   Acute on chronic hypoxic and hypercapnic respiratory failure  Hypernatremia  Community acquired pneumonia  Left adrenal mass  Elevated liver enzymes  Sepsis  Paroxysmal SVT (supraventricular tachycardia)  Scoliosis  Hypertension    PLAN:  Continue advancing patient's mobility with physical therapy.    Her hypertension is also responding well to her new medications    Swallow study today so we can advance oral diet.    Improving  significantly.  PT/OT speech then will work on placement.    Charles Garcia MD  4/15/2017     Electronically signed by Charles Garcia MD at 4/15/2017  1:56 PM      Charles Garcia MD at 4/16/2017  4:11 PM  Version 1 of 1               [address]    4/16/2017    Patient ID:  Name:  Lupe Becker  MRN:  [id number]  1950  66 y.o.  female            CC/Reason for visit: Follow-up for acute on chronic respiratory failure    INTERVAL:  Working with pt.  Passed speech eval    Vitals:  Vitals:    04/16/17 0004 04/16/17 0500 04/16/17 0815 04/16/17 1200   BP: 140/60  151/79 125/79   BP Location: Left arm  Left arm Left arm   Patient Position: Lying  Sitting Sitting   Pulse: 69      Resp: 16  18 18   Temp: 97.8 °F (36.6 °C)  97.5 °F (36.4 °C) 98.4 °F (36.9 °C)   TempSrc: Oral  Oral Oral   SpO2: 95%   100%   Weight:  164 lb 3.2 oz (74.5 kg)     Height:         FiO2 (%): 50 %     Body mass index is 33.16 kg/(m^2).    Intake/Output Summary (Last 24 hours) at 04/16/17 1611  Last data filed at 04/16/17 0622   Gross per 24 hour   Intake             1560 ml   Output              900 ml   Net              660 ml       Exam:  GEN:  No distress, awake, alert, very pleasant.  Has pectus excavatum and very webbed and short neck  LUNGS: Distant and coarse breath sounds, but no wheezing, nonlabored breathing  CV:  Normal S1S2, without murmur, no edema  ABD:  Non tender, no enlarged liver or masses  EXT:  No cyanosis or clubbing    Scheduled meds:      amLODIPine 10 mg Oral Q24H   bisoprolol-hydrochlorothiazide 2 tablet Oral Q24H   enoxaparin 40 mg Subcutaneous Nightly   hydrALAZINE 25 mg Oral Q12H     IV meds:                           Data Review:   I reviewed the patient's medications and new clinical results.  Lab Results   Component Value Date    CALCIUM 8.8 04/16/2017    PHOS 3.8 04/11/2017       Results from last 7 days  Lab Units 04/16/17  0653 04/15/17  0602 04/14/17  1740 04/14/17  0314    AST (SGOT) U/L 31 21  --  30   MAGNESIUM mg/dL  --  1.9  --   --    SODIUM mmol/L 139 144  --  142   POTASSIUM mmol/L 3.4* 4.1 3.4* 2.6*   CHLORIDE mmol/L 96* 99  --  94*   TOTAL CO2 mmol/L 34.5* 37.6*  --  38.7*   BUN mg/dL 8 10  --  10   CREATININE mg/dL 0.55* 0.56*  --  0.52*   GLUCOSE mg/dL 137* 102*  --  101*   CALCIUM mg/dL 8.8 8.7  --  8.6   WBC 10*3/mm3 7.78 8.86  --  10.34   HEMOGLOBIN g/dL 10.7* 10.7*  --  10.7*   PLATELETS 10*3/mm3 243 229  --  242   ALT (SGPT) U/L 45* 40*  --  46*           Results from last 7 days  Lab Units 04/12/17  0931   PH, ARTERIAL pH units 7.441   PO2 ART mm Hg 92.4   PCO2, ARTERIAL mm Hg 48.3*   HCO3 ART mmol/L 32.9*       ASSESSMENT:   Acute on chronic hypoxic and hypercapnic respiratory failure  Hypernatremia  Community acquired pneumonia  Left adrenal mass  Elevated liver enzymes  Sepsis  Paroxysmal SVT (supraventricular tachycardia)  Scoliosis  Hypertension    PLAN:  Continue advancing patient's mobility with physical therapy.      Improving significantly - great to see.    PT/OT speech appreciated.    Discharge to rehab Monday or Tuesday    Charles Garcia MD  4/16/2017     Electronically signed by Charles Garcia MD at 4/16/2017  4:13 PM

## 2017-04-17 NOTE — PLAN OF CARE
Problem: Patient Care Overview (Adult)  Goal: Plan of Care Review  Outcome: Ongoing (interventions implemented as appropriate)    04/17/17 1245   Coping/Psychosocial Response Interventions   Plan Of Care Reviewed With patient   Patient Care Overview   Progress improving   Outcome Evaluation   Outcome Summary/Follow up Plan pt sitting in chair throughout the day with legs elevated and royer hose on. pt denies pain. no signs or symptoms of distress. pt up with assist x1 and walker. currently resting in chair.        Goal: Adult Individualization and Mutuality  Outcome: Ongoing (interventions implemented as appropriate)  Goal: Discharge Needs Assessment  Outcome: Ongoing (interventions implemented as appropriate)    Problem: Respiratory Insufficiency (Adult)  Goal: Acid/Base Balance  Outcome: Ongoing (interventions implemented as appropriate)  Goal: Effective Ventilation  Outcome: Ongoing (interventions implemented as appropriate)    Problem: Fall Risk (Adult)  Goal: Absence of Falls  Outcome: Ongoing (interventions implemented as appropriate)    Problem: Skin Integrity Impairment, Risk/Actual (Adult)  Goal: Skin Integrity/Wound Healing  Outcome: Ongoing (interventions implemented as appropriate)

## 2017-04-17 NOTE — PROGRESS NOTES
[address]    4/17/2017    Patient ID:  Name:  Lupe Becker  MRN:  [id number]  1950  66 y.o.  female            CC/Reason for visit: Follow-up for acute on chronic respiratory failure    INTERVAL:  Doing well working with PT    Vitals:  Vitals:    04/17/17 0745 04/17/17 1057 04/17/17 1245 04/17/17 1617   BP: 146/68  118/59 120/63   BP Location: Left arm  Left arm Left arm   Patient Position: Lying  Sitting Sitting   Pulse:    80   Resp: 19 19 18   Temp: 98.2 °F (36.8 °C)  97.8 °F (36.6 °C) 97.7 °F (36.5 °C)   TempSrc: Oral  Oral Oral   SpO2:  98% 98% 95%   Weight:       Height:         FiO2 (%): 50 %     Body mass index is 32.78 kg/(m^2).  No intake or output data in the 24 hours ending 04/17/17 1746    Exam:  GEN:  No distress, awake, alert, very pleasant.  Has pectus excavatum and very webbed and short neck  LUNGS: Distant and coarse breath sounds, but no wheezing, nonlabored breathing  CV:  Normal S1S2, without murmur, no edema  ABD:  Non tender, no enlarged liver or masses  EXT:  No cyanosis or clubbing    Scheduled meds:      amLODIPine 10 mg Oral Q24H   bisoprolol-hydrochlorothiazide 2 tablet Oral Q24H   enoxaparin 40 mg Subcutaneous Nightly   hydrALAZINE 25 mg Oral Q12H     IV meds:                           Data Review:   I reviewed the patient's medications and new clinical results.  Lab Results   Component Value Date    CALCIUM 8.7 04/17/2017    PHOS 3.8 04/11/2017       Results from last 7 days  Lab Units 04/17/17  0750 04/16/17  2339 04/16/17  0653 04/15/17  0602   AST (SGOT) U/L 26  --  31 21   MAGNESIUM mg/dL  --   --   --  1.9   SODIUM mmol/L 145  --  139 144   POTASSIUM mmol/L 4.4 4.5 3.4* 4.1   CHLORIDE mmol/L 100  --  96* 99   TOTAL CO2 mmol/L 39.6*  --  34.5* 37.6*   BUN mg/dL 9  --  8 10   CREATININE mg/dL 0.56*  --  0.55* 0.56*   GLUCOSE mg/dL 107*  --  137* 102*   CALCIUM mg/dL 8.7  --  8.8 8.7   WBC 10*3/mm3 6.59  --  7.78 8.86   HEMOGLOBIN g/dL 10.5*  --   10.7* 10.7*   PLATELETS 10*3/mm3 232  --  243 229   ALT (SGPT) U/L 46*  --  45* 40*           Results from last 7 days  Lab Units 04/12/17  0931   PH, ARTERIAL pH units 7.441   PO2 ART mm Hg 92.4   PCO2, ARTERIAL mm Hg 48.3*   HCO3 ART mmol/L 32.9*       ASSESSMENT:   Acute on chronic hypoxic and hypercapnic respiratory failure  Community acquired pneumonia  Left adrenal mass  Elevated liver enzymes  Sepsis resolved  Paroxysmal SVT (supraventricular tachycardia) resolved  Scoliosis  Hypertension    PLAN:  Plan on discharge tomorrow am, will need amb oximetry and oxygen assessment.    Discharge to rehab Monday or Tuesday    Charles Garcia MD  4/17/2017

## 2017-04-17 NOTE — PLAN OF CARE
Problem: Patient Care Overview (Adult)  Goal: Plan of Care Review  Outcome: Ongoing (interventions implemented as appropriate)    Problem: Respiratory Insufficiency (Adult)  Goal: Acid/Base Balance  Outcome: Ongoing (interventions implemented as appropriate)  Goal: Effective Ventilation  Outcome: Ongoing (interventions implemented as appropriate)    Problem: Fall Risk (Adult)  Goal: Absence of Falls  Outcome: Ongoing (interventions implemented as appropriate)

## 2017-04-17 NOTE — PROGRESS NOTES
Acute Care - Physical Therapy Treatment Note  [facility]     Patient Name: Lupe Becker  : 1950  MRN: [id number]  Today's Date: 2017  Onset of Illness/Injury or Date of Surgery Date: 17  Date of Referral to PT: 17  Referring Physician: Anne-Marie    Admit Date: 3/31/2017    Visit Dx:    ICD-10-CM ICD-9-CM   1. Sepsis, due to unspecified organism A41.9 038.9     995.91   2. Acute respiratory failure with hypoxia and hypercapnia J96.01 518.81    J96.02    3. Pneumonia of right lower lobe due to infectious organism J18.9 483.8   4. Leukocytosis, unspecified type D72.829 288.60   5. Generalized weakness R53.1 780.79     Patient Active Problem List   Diagnosis   • Accelerated essential hypertension   • Hypoxia   • Cervical fusion syndrome   • Abnormal presence of protein in urine   • Fast heart beat   • Ventricular pre-excitation with arrhythmia   • Adult onset hypothyroidism   • Anxiety about health   • Sepsis   • Paroxysmal SVT (supraventricular tachycardia)               Adult Rehabilitation Note       17 0900 17 0900 04/15/17 0900    Rehab Assessment/Intervention    Discipline physical therapy assistant  -CW physical therapy assistant  -CW physical therapy assistant  -CW    Document Type therapy note (daily note)  -CW therapy note (daily note)  -CW therapy note (daily note)  -CW    Subjective Information agree to therapy;complains of;fatigue  -CW agree to therapy;complains of;fatigue  -CW agree to therapy;complains of;weakness;fatigue  -CW    Patient Effort, Rehab Treatment good  -CW good  -CW good  -CW    Precautions/Limitations fall precautions;oxygen therapy device and L/min  -CW fall precautions;oxygen therapy device and L/min  -CW fall precautions;oxygen therapy device and L/min  -CW    Recorded by [CW] Giuseppe Naqvi [CW] Giuseppe Naqvi [CW] Giuseppe Naqvi    Vital Signs    O2 Delivery Pre Treatment supplemental O2  -CW supplemental O2  -CW supplemental O2  -CW     Recorded by [CW] Giuseppe Naqvi [CW] Giuseppe Naqvi [CW] Giuseppe Naqvi    Pain Assessment    Pain Assessment No/denies pain  -CW No/denies pain  -CW No/denies pain  -CW    Recorded by [CW] Giuseppe Naqvi [CW] Giuseppe Naqvi [CW] Giuseppe Naqvi    Cognitive Assessment/Intervention    Current Cognitive/Communication Assessment functional  -CW functional  -CW functional  -CW    Orientation Status oriented x 4  -CW oriented x 4  -CW oriented x 4  -CW    Follows Commands/Answers Questions 100% of the time  -% of the time  -% of the time  -CW    Personal Safety WNL/WFL  -CW WNL/WFL  -CW WNL/WFL  -CW    Personal Safety Interventions fall prevention program maintained;gait belt;nonskid shoes/slippers when out of bed  -CW fall prevention program maintained;gait belt;nonskid shoes/slippers when out of bed  -CW fall prevention program maintained;gait belt;nonskid shoes/slippers when out of bed  -CW    Recorded by [CW] Giuseppe Naqvi [CW] Giuseppe Naqvi [CW] Giuseppe Naqvi    Bed Mobility, Assessment/Treatment    Bed Mob, Sit to Supine, [place] not tested  -CW not tested  -CW not tested  -CW    Bed Mobility, Comment Sitting EOB  and then went to chair  -CW sitting EOB  -CW sitting EOB  -CW    Recorded by [CW] Giuseppe Naqvi [CW] Giuseppe Naqvi [CW] Giuseppe Naqvi    Transfer Assessment/Treatment    Transfers, Sit-Stand [place] stand by assist  -CW contact guard assist  -CW contact guard assist  -CW    Transfers, Stand-Sit [place] stand by assist  -CW contact guard assist  -CW contact guard assist  -CW    Transfers, Sit-Stand-Sit, Assist Device rolling walker  -CW rolling walker  -CW rolling walker  -CW    Recorded by [CW] Giuseppe Naqvi [CW] Giuseppe Naqvi [CW] Giuseppe Naqvi    Gait Assessment/Treatment    Gait, [place] Level stand by assist  -CW contact guard assist  -CW contact guard assist  -CW    Gait, Assistive Device rolling  walker  -CW rolling walker  -CW rolling walker  -CW    Gait, Distance (Feet) 150  -  -CW 60  -CW    Gait, Gait Pattern Analysis   3-point gait  -CW    Gait, Gait Deviations [place] decreased;step length decreased;stride length decreased  -CW [place] decreased;step length decreased;stride length decreased  -CW [place] decreased;step length decreased;stride length decreased  -CW    Recorded by [CW] Giuseppe Naqvi [CW] Giuseppe Naqvi [CW] Giuseppe Naqvi    Therapy Exercises    Bilateral Lower Extremities AROM:;10 reps;sitting;ankle pumps/circles;LAQ  -CW AROM:;10 reps;sitting;ankle pumps/circles;LAQ  -CW AROM:;10 reps;sitting;ankle pumps/circles;LAQ  -CW    Recorded by [CW] Giuseppe Naqvi [CW] Giuseppe Naqvi [CW] Giuseppe Naqvi    Positioning and Restraints    Pre-Treatment Position sitting in chair/recliner  -CW in bed  -CW in bed  -CW    Post Treatment Position chair  -CW bed  -CW bed  -CW    In Bed  notified nsg;sitting EOB;call light within reach;encouraged to call for assist;exit alarm on  -CW notified nsg;sitting EOB;call light within reach;encouraged to call for assist  -CW    In Chair notified nsg;sitting;call light within reach;encouraged to call for assist;exit alarm on  -CW      Recorded by [CW] Giuseppe Naqvi [CW] Giuseppe Naqvi [CW] Giuseppe Naqvi      04/14/17 1604          Rehab Assessment/Intervention    Discipline physical therapist  -DM      Document Type therapy note (daily note)  -DM      Subjective Information no complaints;agree to therapy  -DM      Patient Effort, Rehab Treatment good  -DM      Symptoms Noted During/After Treatment none  -DM      Precautions/Limitations fall precautions  -DM      Recorded by [DM] Rocío Montero, PT      Vital Signs    Pre SpO2 (%) 93  -DM      O2 Delivery Pre Treatment supplemental O2  -DM      Pre Patient Position Sitting  -DM      Recorded by [DM] Rocío Montero, PT      Pain Assessment    Pain Assessment No/denies pain   -DM      Recorded by [DM] Rocío Montero, PT      Vision Assessment/Intervention    Visual Impairment WFL  -DM      Recorded by [DM] Rocío Montero PT      Cognitive Assessment/Intervention    Current Cognitive/Communication Assessment functional  -DM      Orientation Status oriented x 4  -DM      Follows Commands/Answers Questions 100% of the time  -DM      Personal Safety Interventions fall prevention program maintained  -DM      Recorded by [DM] Rocío Montero, PT      Bed Mobility, Assessment/Treatment    Bed Mob, Supine to Sit, [place] not tested  -DM      Bed Mob, Sit to Supine, [place] not tested  -DM      Bed Mobility, Comment sitting at EOB  -DM      Recorded by [DM] Rocío Montero, PT      Transfer Assessment/Treatment    Transfers, Sit-Stand [place] minimum assist (75% patient effort);1 person + 1 person to manage equipment;verbal cues required  -DM      Transfers, Stand-Sit [place] minimum assist (75% patient effort);verbal cues required;1 person + 1 person to manage equipment  -DM      Transfers, Sit-Stand-Sit, Assist Device standard walker  -DM      Transfer, Safety Issues balance decreased during turns;step length decreased  -DM      Transfer, Impairments strength decreased;impaired balance  -DM      Transfer, Comment cues for hand placement  -DM      Recorded by [DM] Rocío Montero, PT      Gait Assessment/Treatment    Gait, [place] Level contact guard assist;verbal cues required;1 person + 1 person to manage equipment  -DM      Gait, Assistive Device standard walker  -DM      Gait, Distance (Feet) 20  -DM      Gait, Gait Pattern Analysis 3-point gait  -DM      Gait, Gait Deviations [place] decreased;decreased heel strike;double stance time increased;step length decreased;toe-to-floor clearance decreased  -DM      Gait, Safety Issues balance decreased during turns;step length decreased;supplemental O2  -DM      Gait, Impairments strength decreased;impaired balance  -DM      Gait,  Comment try RW next visit  -DM      Recorded by [DM] Rocío Montero, PT      Motor Skills/Interventions    Additional Documentation Balance Skills Training (Group)  -DM      Recorded by [DELPHINE] Rocío Montero, PT      Balance Skills Training    Sitting-Level of Assistance Distant supervision  -DM      Standing-Level of Assistance Contact guard  -DM      Static Standing Balance Support assistive device  -DM      Gait Balance-Level of Assistance Contact guard  -DM      Gait Balance Support assistive device  -DM      Recorded by [DM] Rocío Montero, PT      Therapy Exercises    Bilateral Lower Extremities AROM:;10 reps;sitting;ankle pumps/circles;LAQ  -DM      Recorded by [DM] Rocío Montero, PT      Positioning and Restraints    Pre-Treatment Position in bed  -DM      Post Treatment Position bed  -DM      In Bed notified nsg;sitting EOB;call light within reach;encouraged to call for assist;exit alarm on;with family/caregiver  -DM      Recorded by [DM] Rocío Montero, PT        User Key  (r) = Recorded By, (t) = Taken By, (c) = Cosigned By    Initials Name Effective Dates    DELPHINE Montero, PT 10/06/15 -     CW Giuseppe Naqvi 12/13/16 -                 IP PT Goals       04/13/17 1218 04/08/17 1138 04/04/17 1339    Bed Mobility PT LTG    Bed Mobility PT LTG, Date Established 04/13/17  -PC  04/04/17  -DM (r) CH (t) DM (c)    Bed Mobility PT LTG, Time to Achieve 1 wk  -PC  1 wk  -DM (r) CH (t) DM (c)    Bed Mobility PT LTG, Activity Type supine to sit/sit to supine  -PC  all bed mobility  -DM (r) CH (t) DM (c)    Bed Mobility PT LTG, [place] Level minimum assist (75% patient effort)  -PC  moderate assist (50% patient effort);2 person assist required  -DM (r) CH (t) DM (c)    Bed Mobility PT LTG, Date Goal Reviewed  04/08/17  -     Bed Mobility PT LTG, Outcome  goal no longer appropriate  -LH     Transfer Training PT LTG    Transfer Training PT LTG, Date Established 04/13/17  -PC      Transfer Training PT LTG, Time to  Achieve 1 wk  -PC      Transfer Training PT LTG, Activity Type sit to stand/stand to sit  -PC      Transfer Training PT LTG, [place] Level minimum assist (75% patient effort)  -PC      Gait Training PT LTG    Gait Training Goal PT LTG, Date Established 04/13/17  -PC      Gait Training Goal PT LTG, Time to Achieve 1 wk  -PC      Gait Training Goal PT LTG, [place] Level minimum assist (75% patient effort);2 person assist required  -PC      Gait Training Goal PT LTG, Assist Device walker, rolling  -PC      Gait Training Goal PT LTG, Distance to Achieve 15 ft  -PC      Static Sitting Balance PT LTG    Static Sitting Balance PT LTG, Date Established   04/04/17  -DM (r) CH (t) DM (c)    Static Sitting Balance PT LTG, Time to Achieve   1 wk  -DM (r) CH (t) DM (c)    Static Sitting Balance PT LTG, [place] Level   supervision required  -DM (r) CH (t) DM (c)    Static Sitting Balance PT LTG, Date Goal Reviewed  04/08/17  -     Static Sitting Balance PT LTG, Outcome  goal no longer appropriate  -       User Key  (r) = Recorded By, (t) = Taken By, (c) = Cosigned By    Initials Name Provider Type    DM Rocío Montero, PT Physical Therapist    LH Idalia Remy, PT Physical Therapist    SMITHA Lau, PT Physical Therapist     Celso Torres, PT Student PT Student          Physical Therapy Education     Title: PT OT SLP Therapies (Done)     Topic: Physical Therapy (Done)     Point: Mobility training (Done)    Learning Progress Summary    Learner Readiness Method Response Comment Documented by Status   Patient Acceptance E,TB DU,VU  CW 04/17/17 0914 Done    Acceptance E,TB DU,VU  CW 04/16/17 0928 Done    Acceptance E,TB DU,VU  CW 04/15/17 0939 Done    Eager E,TB,D NR,VU  DM 04/14/17 1618 Done    Acceptance E,D NR  PC 04/13/17 1217 Active    Acceptance E VU,NR  MODE 04/07/17 1636 Done    Acceptance E VU  MB 04/06/17 1745 Done    Acceptance E NR  CH 04/04/17 1338 Active   Family Acceptance E VU  MB 04/06/17 1745  Done               Point: Home exercise program (Done)    Learning Progress Summary    Learner Readiness Method Response Comment Documented by Status   Patient Acceptance E,TB DU,VU  CW 04/17/17 0914 Done    Acceptance E,TB DU,VU  CW 04/16/17 0928 Done    Acceptance E,TB DU,VU  CW 04/15/17 0939 Done    Eager E,TB,D NR,VU  DM 04/14/17 1618 Done    Acceptance E,D NR  PC 04/13/17 1217 Active               Point: Body mechanics (Done)    Learning Progress Summary    Learner Readiness Method Response Comment Documented by Status   Patient Acceptance E,TB DU,VU  CW 04/17/17 0914 Done    Acceptance E,TB DU,VU  CW 04/16/17 0928 Done    Acceptance E,TB DU,VU  CW 04/15/17 0939 Done    Eager E,TB,D NR,VU  DM 04/14/17 1618 Done    Acceptance E,D NR  PC 04/13/17 1217 Active    Acceptance E VU,NR  MODE 04/07/17 1636 Done               Point: Precautions (Done)    Learning Progress Summary    Learner Readiness Method Response Comment Documented by Status   Patient Acceptance E,TB DU,VU  CW 04/17/17 0914 Done    Acceptance E,TB DU,VU  CW 04/16/17 0928 Done    Acceptance E,TB DU,VU  CW 04/15/17 0939 Done    Eager E,TB,D NR,VU  DM 04/14/17 1618 Done    Acceptance E,D NR  PC 04/13/17 1217 Active    Acceptance E VU,NR  MODE 04/07/17 1636 Done                      User Key     Initials Effective Dates Name Provider Type Discipline    DM 10/06/15 -  Rocío Montero, PT Physical Therapist PT    PC 12/01/15 -  Evonne Lau, PT Physical Therapist PT    MODE 04/24/15 -  Miguelito Neri PTA Physical Therapy Assistant PT     12/13/16 -  Giuseppe Naqvi Physical Therapy Assistant PT     01/31/17 -  Celso Torres, PT Student PT Student PT    MB 03/06/17 -  Alexia Cairas, RN Registered Nurse Nurse                    PT Recommendation and Plan  Anticipated Discharge Disposition: inpatient rehabilitation facility, skilled nursing facility  Planned Therapy Interventions: balance training, bed mobility training, gait training, home exercise program,  strengthening, transfer training  PT Frequency: daily  Plan of Care Review  Plan Of Care Reviewed With: patient  Progress: progress toward functional goals as expected  Outcome Summary/Follow up Plan: Pt is increasing with strength and balance when amb with RWX          Outcome Measures       04/17/17 0900 04/16/17 0900 04/15/17 0900    How much help from another person do you currently need...    Turning from your back to your side while in flat bed without using bedrails? 3  -CW 3  -CW 3  -CW    Moving from lying on back to sitting on the side of a flat bed without bedrails? 3  -CW 3  -CW 3  -CW    Moving to and from a bed to a chair (including a wheelchair)? 3  -CW 3  -CW 3  -CW    Standing up from a chair using your arms (e.g., wheelchair, bedside chair)? 3  -CW 3  -CW 3  -CW    Climbing 3-5 steps with a railing? 1  -CW 1  -CW 1  -CW    To walk in hospital room? 3  -CW 3  -CW 3  -CW    AM-PAC 6 Clicks Score 16  -CW 16  -CW 16  -CW    Functional Assessment    Outcome Measure Options AM-PAC 6 Clicks Basic Mobility (PT)  -CW AM-PAC 6 Clicks Basic Mobility (PT)  -CW AM-PAC 6 Clicks Basic Mobility (PT)  -CW      04/14/17 1600          How much help from another person do you currently need...    Turning from your back to your side while in flat bed without using bedrails? 2  -DM      Moving from lying on back to sitting on the side of a flat bed without bedrails? 2  -DM      Moving to and from a bed to a chair (including a wheelchair)? 3  -DM      Standing up from a chair using your arms (e.g., wheelchair, bedside chair)? 3  -DM      Climbing 3-5 steps with a railing? 1  -DM      To walk in hospital room? 3  -DM      AM-PAC 6 Clicks Score 14  -DM      Functional Assessment    Outcome Measure Options AM-PAC 6 Clicks Basic Mobility (PT)  -DM        User Key  (r) = Recorded By, (t) = Taken By, (c) = Cosigned By    Initials Name Provider Type    DELPHINE Montero, PT Physical Therapist    DAMIAN Naqvi Physical  Therapy Assistant           Time Calculation:         PT Charges       04/17/17 0915          Time Calculation    Start Time 0830  -CW      Stop Time 0845  -CW      Time Calculation (min) 15 min  -CW      PT Received On 04/17/17  -CW      PT - Next Appointment 04/18/17  -CW        User Key  (r) = Recorded By, (t) = Taken By, (c) = Cosigned By    Initials Name Provider Type    CW Giuseppe Naqvi Physical Therapy Assistant          Therapy Charges for Today     Code Description Service Date Service Provider Modifiers Qty    52455157005 HC PT THER PROC EA 15 MIN 4/16/2017 Giuseppe Naqvi GP 1    23040132694 HC PT THER PROC EA 15 MIN 4/17/2017 Giuseppe Nqavi GP 1    36866486085 HC PT THER SUPP EA 15 MIN 4/17/2017 Giuseppe Naqvi GP 1          PT G-Codes  Outcome Measure Options: AM-PAC 6 Clicks Basic Mobility (PT)    Giuseppe Naqvi  4/17/2017

## 2017-04-18 VITALS
TEMPERATURE: 98 F | HEIGHT: 59 IN | DIASTOLIC BLOOD PRESSURE: 59 MMHG | RESPIRATION RATE: 24 BRPM | WEIGHT: 162.3 LBS | OXYGEN SATURATION: 96 % | SYSTOLIC BLOOD PRESSURE: 129 MMHG | HEART RATE: 74 BPM | BODY MASS INDEX: 32.72 KG/M2

## 2017-04-18 LAB
ALBUMIN SERPL-MCNC: 3.5 G/DL (ref 3.5–5.2)
ALBUMIN/GLOB SERPL: 1.1 G/DL
ALP SERPL-CCNC: 87 U/L (ref 39–117)
ALT SERPL W P-5'-P-CCNC: 51 U/L (ref 1–33)
ANION GAP SERPL CALCULATED.3IONS-SCNC: 8.1 MMOL/L
AST SERPL-CCNC: 33 U/L (ref 1–32)
BASOPHILS # BLD AUTO: 0.01 10*3/MM3 (ref 0–0.2)
BASOPHILS NFR BLD AUTO: 0.2 % (ref 0–1.5)
BILIRUB SERPL-MCNC: 0.4 MG/DL (ref 0.1–1.2)
BUN BLD-MCNC: 7 MG/DL (ref 8–23)
BUN/CREAT SERPL: 12.1 (ref 7–25)
CALCIUM SPEC-SCNC: 8.9 MG/DL (ref 8.6–10.5)
CHLORIDE SERPL-SCNC: 94 MMOL/L (ref 98–107)
CO2 SERPL-SCNC: 36.9 MMOL/L (ref 22–29)
CREAT BLD-MCNC: 0.58 MG/DL (ref 0.57–1)
DEPRECATED RDW RBC AUTO: 51.8 FL (ref 37–54)
EOSINOPHIL # BLD AUTO: 0.25 10*3/MM3 (ref 0–0.7)
EOSINOPHIL NFR BLD AUTO: 3.9 % (ref 0.3–6.2)
ERYTHROCYTE [DISTWIDTH] IN BLOOD BY AUTOMATED COUNT: 16 % (ref 11.7–13)
GFR SERPL CREATININE-BSD FRML MDRD: 104 ML/MIN/1.73
GLOBULIN UR ELPH-MCNC: 3.2 GM/DL
GLUCOSE BLD-MCNC: 103 MG/DL (ref 65–99)
HCT VFR BLD AUTO: 36.2 % (ref 35.6–45.5)
HGB BLD-MCNC: 10.5 G/DL (ref 11.9–15.5)
IMM GRANULOCYTES # BLD: 0.03 10*3/MM3 (ref 0–0.03)
IMM GRANULOCYTES NFR BLD: 0.5 % (ref 0–0.5)
LYMPHOCYTES # BLD AUTO: 1.41 10*3/MM3 (ref 0.9–4.8)
LYMPHOCYTES NFR BLD AUTO: 21.9 % (ref 19.6–45.3)
MCH RBC QN AUTO: 26.1 PG (ref 26.9–32)
MCHC RBC AUTO-ENTMCNC: 29 G/DL (ref 32.4–36.3)
MCV RBC AUTO: 90 FL (ref 80.5–98.2)
MONOCYTES # BLD AUTO: 1.24 10*3/MM3 (ref 0.2–1.2)
MONOCYTES NFR BLD AUTO: 19.2 % (ref 5–12)
NEUTROPHILS # BLD AUTO: 3.51 10*3/MM3 (ref 1.9–8.1)
NEUTROPHILS NFR BLD AUTO: 54.3 % (ref 42.7–76)
NRBC BLD MANUAL-RTO: 0 /100 WBC (ref 0–0)
PLATELET # BLD AUTO: 272 10*3/MM3 (ref 140–500)
PMV BLD AUTO: 11.4 FL (ref 6–12)
POTASSIUM BLD-SCNC: 4 MMOL/L (ref 3.5–5.2)
PROT SERPL-MCNC: 6.7 G/DL (ref 6–8.5)
RBC # BLD AUTO: 4.02 10*6/MM3 (ref 3.9–5.2)
SODIUM BLD-SCNC: 139 MMOL/L (ref 136–145)
WBC NRBC COR # BLD: 6.45 10*3/MM3 (ref 4.5–10.7)

## 2017-04-18 PROCEDURE — 85025 COMPLETE CBC W/AUTO DIFF WBC: CPT | Performed by: INTERNAL MEDICINE

## 2017-04-18 PROCEDURE — 80053 COMPREHEN METABOLIC PANEL: CPT | Performed by: INTERNAL MEDICINE

## 2017-04-18 PROCEDURE — 94760 N-INVAS EAR/PLS OXIMETRY 1: CPT

## 2017-04-18 PROCEDURE — 97110 THERAPEUTIC EXERCISES: CPT

## 2017-04-18 RX ORDER — AMLODIPINE BESYLATE 5 MG/1
10 TABLET ORAL DAILY
Qty: 30 TABLET | Refills: 0 | Status: SHIPPED | OUTPATIENT
Start: 2017-04-18 | End: 2017-05-15 | Stop reason: SDUPTHER

## 2017-04-18 RX ADMIN — AMLODIPINE BESYLATE 10 MG: 10 TABLET ORAL at 09:47

## 2017-04-18 RX ADMIN — HYDRALAZINE HYDROCHLORIDE 25 MG: 25 TABLET ORAL at 09:47

## 2017-04-18 RX ADMIN — BISOPROLOL FUMARATE AND HYDROCHLOROTHIAZIDE 2 TABLET: 6.25; 2.5 TABLET ORAL at 09:47

## 2017-04-18 NOTE — PLAN OF CARE
Problem: Patient Care Overview (Adult)  Goal: Plan of Care Review  Outcome: Ongoing (interventions implemented as appropriate)    04/18/17 0834 04/18/17 1729   Coping/Psychosocial Response Interventions   Plan Of Care Reviewed With patient --    Patient Care Overview   Progress improving --    Outcome Evaluation   Outcome Summary/Follow up Plan --  PT awaiting rep from [facility] to bring oxygen, and then she will be ready for d/c       Goal: Adult Individualization and Mutuality  Outcome: Ongoing (interventions implemented as appropriate)  Goal: Discharge Needs Assessment  Outcome: Ongoing (interventions implemented as appropriate)

## 2017-04-18 NOTE — PROGRESS NOTES
Exercise Oximetry    Patient Name:Lupe Becker   MRN: [id number]   Date: 04/18/17             ROOM AIR BASELINE   SpO2% 96   Heart Rate 74   Blood Pressure      EXERCISE ON ROOM AIR SpO2% EXERCISE ON O2 @ 2 LPM SpO2%   1 MINUTE 96 1 MINUTE 93   2 MINUTES 92 2 MINUTES 94   3 MINUTES 89 3 MINUTES 94   4 MINUTES 87 4 MINUTES 94   5 MINUTES  - 5 MINUTES 94   6 MINUTES  - 6 MINUTES 94              Distance Walked   Distance Walked   Dyspnea ([place] Scale)   Dyspnea ([place] Scale)   Fatigue ([place] Scale)   Fatigue ([place] Scale)   SpO2% Post Exercise  94 SpO2% Post Exercise   HR Post Exercise  79 HR Post Exercise   Time to Recovery   Time to Recovery     Comments: patient tolerated walk on RA until approximately 4 minutes in when O2 saturation decreased to 87%. Placed patient on 2L nasal cannula and continued walk. Patient sats increased to 94% and maintained for duration of walk. Patient never became SOA even when O2 sats decreased.

## 2017-04-18 NOTE — PROGRESS NOTES
Acute Care - Physical Therapy Treatment Note  [facility]     Patient Name: Lupe Becker  : 1950  MRN: [id number]  Today's Date: 2017  Onset of Illness/Injury or Date of Surgery Date: 17  Date of Referral to PT: 17  Referring Physician: Anne-Marie    Admit Date: 3/31/2017    Visit Dx:    ICD-10-CM ICD-9-CM   1. Sepsis, due to unspecified organism A41.9 038.9     995.91   2. Acute respiratory failure with hypoxia and hypercapnia J96.01 518.81    J96.02    3. Pneumonia of right lower lobe due to infectious organism J18.9 483.8   4. Leukocytosis, unspecified type D72.829 288.60   5. Generalized weakness R53.1 780.79     Patient Active Problem List   Diagnosis   • Accelerated essential hypertension   • Hypoxia   • Cervical fusion syndrome   • Abnormal presence of protein in urine   • Fast heart beat   • Ventricular pre-excitation with arrhythmia   • Adult onset hypothyroidism   • Anxiety about health   • Sepsis   • Paroxysmal SVT (supraventricular tachycardia)               Adult Rehabilitation Note       17 0831 17 0900 17 0900    Rehab Assessment/Intervention    Discipline physical therapist  -MQ physical therapy assistant  -CW physical therapy assistant  -CW    Document Type therapy note (daily note)  -MQ therapy note (daily note)  -CW therapy note (daily note)  -CW    Subjective Information no complaints;agree to therapy  -MQ agree to therapy;complains of;fatigue  -CW agree to therapy;complains of;fatigue  -CW    Patient Effort, Rehab Treatment good  -MQ good  -CW good  -CW    Symptoms Noted During/After Treatment fatigue  -MQ      Precautions/Limitations fall precautions;oxygen therapy device and L/min   3L/min O2 per NC  -MQ fall precautions;oxygen therapy device and L/min  -CW fall precautions;oxygen therapy device and L/min  -CW    Precautions/Limitations, Vision WFL  -MQ      Precautions/Limitations, Hearing WFL  -MQ      Recorded by [MQ] Alexia Leigh, PT [CW] Giuseppe  LILLIE Naqvi [CW] Giuseppe Naqvi    Vital Signs    Pretreatment Heart Rate (beats/min) 85  -MQ      Intratreatment Heart Rate (beats/min) 111  -MQ      Posttreatment Heart Rate (beats/min) 82  -MQ      Pre SpO2 (%) 95  -MQ      O2 Delivery Pre Treatment supplemental O2  -MQ supplemental O2  -CW supplemental O2  -CW    Intra SpO2 (%) 92  -MQ      O2 Delivery Intra Treatment supplemental O2  -MQ      Post SpO2 (%) 94  -MQ      O2 Delivery Post Treatment supplemental O2  -MQ      Pre Patient Position Sitting  -MQ      Intra Patient Position Standing  -MQ      Post Patient Position Sitting  -MQ      Recorded by [MQ] Alexia Leigh, PT [CW] Giuseppe Naqvi [CW] Giuseppe Naqvi    Pain Assessment    Pain Assessment No/denies pain  -MQ No/denies pain  -CW No/denies pain  -CW    Recorded by [MQ] Alexia Leigh, PT [CW] Giuseppe Naqvi [CW] Giuseppe Naqvi    Cognitive Assessment/Intervention    Current Cognitive/Communication Assessment functional  -MQ functional  -CW functional  -CW    Orientation Status oriented x 4  -MQ oriented x 4  -CW oriented x 4  -CW    Follows Commands/Answers Questions able to follow single-step instructions;100% of the time  -% of the time  -% of the time  -CW    Personal Safety WNL/WFL  -MQ WNL/WFL  -CW WNL/WFL  -CW    Personal Safety Interventions fall prevention program maintained;gait belt;nonskid shoes/slippers when out of bed;supervised activity  -MQ fall prevention program maintained;gait belt;nonskid shoes/slippers when out of bed  -CW fall prevention program maintained;gait belt;nonskid shoes/slippers when out of bed  -CW    Recorded by [MQ] Alexia Leigh, PT [CW] Giuseppe Naqvi [CW] Giuseppe Naqvi    Bed Mobility, Assessment/Treatment    Bed Mob, Sit to Supine, [place]  not tested  -CW not tested  -CW    Bed Mobility, Comment deferred; pt sitting EOB  -MQ Sitting EOB  and then went to chair  -CW sitting EOB  -CW    Recorded by [MQ] Alexia  Lu, PT [CW] Giuseppe Naqvi [CW] Giuseppe Naqvi    Transfer Assessment/Treatment    Transfers, Sit-Stand [place] contact guard assist;verbal cues required  -MQ stand by assist  -CW contact guard assist  -CW    Transfers, Stand-Sit [place] contact guard assist;verbal cues required  -MQ stand by assist  -CW contact guard assist  -CW    Transfers, Sit-Stand-Sit, Assist Device rolling walker  -MQ rolling walker  -CW rolling walker  -CW    Recorded by [MQ] Alexia Leigh, PT [CW] Giuseppe Naqvi [CW] Giuseppe Naqvi    Gait Assessment/Treatment    Gait, [place] Level contact guard assist;stand by assist  -MQ stand by assist  -CW contact guard assist  -CW    Gait, Assistive Device rolling walker  -MQ rolling walker  -CW rolling walker  -CW    Gait, Distance (Feet) 300  -  -  -CW    Gait, Gait Deviations ang[place] decreased;bilateral:;step length decreased;decreased heel strike  -MQ ang[place] decreased;step length decreased;stride length decreased  -CW ang[place] decreased;step length decreased;stride length decreased  -CW    Recorded by [MQ] Alexia Leigh, PT [CW] Giuseppe Naqvi [CW] Giuseppe Naqvi    Therapy Exercises    Bilateral Lower Extremities  AROM:;10 reps;sitting;ankle pumps/circles;LAQ  -CW AROM:;10 reps;sitting;ankle pumps/circles;LAQ  -CW    Recorded by  [CW] Giuseppe Naqvi [CW] Giuseppe Naqvi    Positioning and Restraints    Pre-Treatment Position in bed  -MQ sitting in chair/recliner  -CW in bed  -CW    Post Treatment Position bed  - chair  -CW bed  -CW    In Bed sitting EOB;call light within reach;encouraged to call for assist  -MQ  notified nsg;sitting EOB;call light within reach;encouraged to call for assist;exit alarm on  -CW    In Chair  notified nsg;sitting;call light within reach;encouraged to call for assist;exit alarm on  -CW     Recorded by [MQ] Alexia Leigh, PT [CW] Giuseppe Naqvi [CW] Giuseppe Naqvi      04/15/17 0900          Rehab  Assessment/Intervention    Discipline physical therapy assistant  -CW      Document Type therapy note (daily note)  -CW      Subjective Information agree to therapy;complains of;weakness;fatigue  -CW      Patient Effort, Rehab Treatment good  -CW      Precautions/Limitations fall precautions;oxygen therapy device and L/min  -CW      Recorded by [CW] Giuseppe Naqvi      Vital Signs    O2 Delivery Pre Treatment supplemental O2  -CW      Recorded by [CW] Giuseppe Naqvi      Pain Assessment    Pain Assessment No/denies pain  -CW      Recorded by [CW] Giuseppe Naqvi      Cognitive Assessment/Intervention    Current Cognitive/Communication Assessment functional  -CW      Orientation Status oriented x 4  -CW      Follows Commands/Answers Questions 100% of the time  -CW      Personal Safety WNL/WFL  -CW      Personal Safety Interventions fall prevention program maintained;gait belt;nonskid shoes/slippers when out of bed  -CW      Recorded by [DAMIAN] Giuseppe Naqvi      Bed Mobility, Assessment/Treatment    Bed Mob, Sit to Supine, [place] not tested  -CW      Bed Mobility, Comment sitting EOB  -CW      Recorded by [CW] Giuseppe Naqvi      Transfer Assessment/Treatment    Transfers, Sit-Stand Violet contact guard assist  -CW      Transfers, Stand-Sit [place] contact guard assist  -CW      Transfers, Sit-Stand-Sit, Assist Device rolling walker  -CW      Recorded by [CW] Giuseppe Naqvi      Gait Assessment/Treatment    Gait, [place] Level contact guard assist  -CW      Gait, Assistive Device rolling walker  -CW      Gait, Distance (Feet) 60  -CW      Gait, Gait Pattern Analysis 3-point gait  -CW      Gait, Gait Deviations angeles decreased;step length decreased;stride length decreased  -CW      Recorded by [CW] Giuseppe Naqvi      Therapy Exercises    Bilateral Lower Extremities AROM:;10 reps;sitting;ankle pumps/circles;LAQ  -CW      Recorded by [CW] Giuseppe Naqvi       Positioning and Restraints    Pre-Treatment Position in bed  -CW      Post Treatment Position bed  -CW      In Bed notified nsg;sitting EOB;call light within reach;encouraged to call for assist  -CW      Recorded by [CW] Giuseppe Naqvi        User Key  (r) = Recorded By, (t) = Taken By, (c) = Cosigned By    Initials Name Effective Dates     Alexia Leigh, PT 12/11/15 -     CW Giuseppe Naqvi 12/13/16 -                 IP PT Goals       04/13/17 1218 04/08/17 1138       Bed Mobility PT LTG    Bed Mobility PT LTG, Date Established 04/13/17  -PC      Bed Mobility PT LTG, Time to Achieve 1 wk  -PC      Bed Mobility PT LTG, Activity Type supine to sit/sit to supine  -PC      Bed Mobility PT LTG, [place] Level minimum assist (75% patient effort)  -      Bed Mobility PT LTG, Date Goal Reviewed  04/08/17  -     Bed Mobility PT LTG, Outcome  goal no longer appropriate  -     Transfer Training PT LTG    Transfer Training PT LTG, Date Established 04/13/17  -      Transfer Training PT LTG, Time to Achieve 1 wk  -PC      Transfer Training PT LTG, Activity Type sit to stand/stand to sit  -PC      Transfer Training PT LTG, [place] Level minimum assist (75% patient effort)  -PC      Gait Training PT LTG    Gait Training Goal PT LTG, Date Established 04/13/17  -      Gait Training Goal PT LTG, Time to Achieve 1 wk  -PC      Gait Training Goal PT LTG, [place] Level minimum assist (75% patient effort);2 person assist required  -PC      Gait Training Goal PT LTG, Assist Device walker, rolling  -PC      Gait Training Goal PT LTG, Distance to Achieve 15 ft  -PC      Static Sitting Balance PT LTG    Static Sitting Balance PT LTG, Date Goal Reviewed  04/08/17  -     Static Sitting Balance PT LTG, Outcome  goal no longer appropriate  -       User Key  (r) = Recorded By, (t) = Taken By, (c) = Cosigned By    Initials Name Provider Type     Idalia Remy, PT Physical Therapist    PC Evonne Lau, PT  Physical Therapist          Physical Therapy Education     Title: PT OT SLP Therapies (Done)     Topic: Physical Therapy (Done)     Point: Mobility training (Done)    Learning Progress Summary    Learner Readiness Method Response Comment Documented by Status   Patient Acceptance E,TB,D VU  MQ 04/18/17 0834 Done    Acceptance E,TB DU,VU  CW 04/17/17 0914 Done    Acceptance E,TB DU,VU  CW 04/16/17 0928 Done    Acceptance E,TB DU,VU  CW 04/15/17 0939 Done    Eager E,TB,D NR,VU  DM 04/14/17 1618 Done    Acceptance E,D NR  PC 04/13/17 1217 Active    Acceptance E VU,NR  MODE 04/07/17 1636 Done    Acceptance E VU  MB 04/06/17 1745 Done    Acceptance E NR  CH 04/04/17 1338 Active   Family Acceptance E VU  MB 04/06/17 1745 Done               Point: Home exercise program (Done)    Learning Progress Summary    Learner Readiness Method Response Comment Documented by Status   Patient Acceptance E,TB,D VU  MQ 04/18/17 0834 Done    Acceptance E,TB DU,VU  CW 04/17/17 0914 Done    Acceptance E,TB DU,VU  CW 04/16/17 0928 Done    Acceptance E,TB DU,VU  CW 04/15/17 0939 Done    Eager E,TB,D NR,VU  DM 04/14/17 1618 Done    Acceptance E,D NR  PC 04/13/17 1217 Active               Point: Body mechanics (Done)    Learning Progress Summary    Learner Readiness Method Response Comment Documented by Status   Patient Acceptance E,TB,D VU  MQ 04/18/17 0834 Done    Acceptance E,TB DU,VU  CW 04/17/17 0914 Done    Acceptance E,TB DU,VU  CW 04/16/17 0928 Done    Acceptance E,TB DU,VU  CW 04/15/17 0939 Done    Eager E,TB,D NR,VU  DM 04/14/17 1618 Done    Acceptance E,D NR  PC 04/13/17 1217 Active    Acceptance E VU,NR  MODE 04/07/17 1636 Done               Point: Precautions (Done)    Learning Progress Summary    Learner Readiness Method Response Comment Documented by Status   Patient Acceptance E,TB,D VU  MQ 04/18/17 0834 Done    Acceptance E,TB DU,VU  CW 04/17/17 0914 Done    Acceptance E,TB DU,VU  CW 04/16/17 0928 Done    Acceptance E,TB DU,TAWANNA  CW  04/15/17 0939 Done    Eager E,TB,D NR,VU  DM 04/14/17 1618 Done    Acceptance E,D NR  PC 04/13/17 1217 Active    Acceptance E VU,NR  MODE 04/07/17 1636 Done                      User Key     Initials Effective Dates Name Provider Type Discipline    DM 10/06/15 -  Rocío Montero, PT Physical Therapist PT    PC 12/01/15 -  Evonne Lau, PT Physical Therapist PT     12/11/15 -  Alexia Leigh, PT Physical Therapist PT    MODE 04/24/15 -  Miguelito Neri, PTA Physical Therapy Assistant PT     12/13/16 -  Giuseppe Naqvi Physical Therapy Assistant PT     01/31/17 -  Celso Torres, PT Student PT Student PT    MB 03/06/17 -  Alexia Carias, RN Registered Nurse Nurse                    PT Recommendation and Plan  Anticipated Discharge Disposition: inpatient rehabilitation facility, skilled nursing facility  Planned Therapy Interventions: balance training, bed mobility training, gait training, home exercise program, strengthening, transfer training  PT Frequency: daily  Plan of Care Review  Plan Of Care Reviewed With: patient  Progress: improving  Outcome Summary/Follow up Plan: Pt able to increase ambulation distance today with stable vital signs.           Outcome Measures       04/18/17 0835 04/17/17 0900 04/16/17 0900    How much help from another person do you currently need...    Turning from your back to your side while in flat bed without using bedrails? 3  -MQ 3  -CW 3  -CW    Moving from lying on back to sitting on the side of a flat bed without bedrails? 3  -MQ 3  -CW 3  -CW    Moving to and from a bed to a chair (including a wheelchair)? 3  -MQ 3  -CW 3  -CW    Standing up from a chair using your arms (e.g., wheelchair, bedside chair)? 3  -MQ 3  -CW 3  -CW    Climbing 3-5 steps with a railing? 2  -MQ 1  -CW 1  -CW    To walk in hospital room? 3  -MQ 3  -CW 3  -CW    AM-PAC 6 Clicks Score 17  -MQ 16  -CW 16  -CW    Functional Assessment    Outcome Measure Options AM-PAC 6 Clicks Basic Mobility (PT)  -MQ AM-PAC  6 Clicks Basic Mobility (PT)  -CW AM-PAC 6 Clicks Basic Mobility (PT)  -CW      04/15/17 0900          How much help from another person do you currently need...    Turning from your back to your side while in flat bed without using bedrails? 3  -CW      Moving from lying on back to sitting on the side of a flat bed without bedrails? 3  -CW      Moving to and from a bed to a chair (including a wheelchair)? 3  -CW      Standing up from a chair using your arms (e.g., wheelchair, bedside chair)? 3  -CW      Climbing 3-5 steps with a railing? 1  -CW      To walk in hospital room? 3  -CW      AM-PAC 6 Clicks Score 16  -CW      Functional Assessment    Outcome Measure Options AM-PAC 6 Clicks Basic Mobility (PT)  -CW        User Key  (r) = Recorded By, (t) = Taken By, (c) = Cosigned By    Initials Name Provider Type     Alexia Leigh PT Physical Therapist    CW Giuseppe Naqvi Physical Therapy Assistant           Time Calculation:         PT Charges       04/18/17 0835          Time Calculation    Start Time 0819  -      Stop Time 0832  -      Time Calculation (min) 13 min  -      PT Received On 04/18/17  -      PT - Next Appointment 04/19/17  -      PT Goal Re-Cert Due Date 04/20/17  -        User Key  (r) = Recorded By, (t) = Taken By, (c) = Cosigned By    Initials Name Provider Type     Alexia Leigh PT Physical Therapist          Therapy Charges for Today     Code Description Service Date Service Provider Modifiers Qty    74080773405 HC PT THER PROC EA 15 MIN 4/18/2017 Alexia Leigh PT GP 1          PT G-Codes  Outcome Measure Options: AM-PAC 6 Clicks Basic Mobility (PT)    Alexia Leigh PT  4/18/2017

## 2017-04-18 NOTE — PLAN OF CARE
Problem: Patient Care Overview (Adult)  Goal: Plan of Care Review  Outcome: Ongoing (interventions implemented as appropriate)    04/18/17 0101   Coping/Psychosocial Response Interventions   Plan Of Care Reviewed With patient   Patient Care Overview   Progress improving   Outcome Evaluation   Outcome Summary/Follow up Plan pts legs kept elevated and in TEDs; pt breathing better when HOB raised while sleeping; pt sat in chair and ambulated to bathroom w/walker; no distress noted; will continue to monitor         Problem: Respiratory Insufficiency (Adult)  Goal: Acid/Base Balance  Outcome: Ongoing (interventions implemented as appropriate)  Goal: Effective Ventilation  Outcome: Ongoing (interventions implemented as appropriate)    Problem: Fall Risk (Adult)  Goal: Absence of Falls  Outcome: Ongoing (interventions implemented as appropriate)    Problem: Skin Integrity Impairment, Risk/Actual (Adult)  Goal: Skin Integrity/Wound Healing  Outcome: Ongoing (interventions implemented as appropriate)

## 2017-04-18 NOTE — DISCHARGE SUMMARY
PHYSICIAN DISCHARGE SUMMARY                                                                          [facility]    Patient Identification:  Name: Lupe Becker  Age: 66 y.o.  Sex: female  :  1950  MRN: [id number]  Primary Care Physician: Kiley Wei MD    Admit date: 3/31/2017  Discharge date and time:2017  Discharged Condition: good    Discharge Diagnoses:Active Problems:    Sepsis    Paroxysmal SVT (supraventricular tachycardia)       Hospital Course: Lupe Becker presented to [facility]  On 3/31/2017 an was treated for acute hypoxic resp failure with mechanical ventilation, abx for severe lobar pneumonia and after prolonged ventilation of almost 7-9 days.  She completed a course of abx.  She will be discharged home on 2L nc with exertion.  She wished to go home with home health.    She will follow up with me in 6 weeks, and will need to follow with her PCP for adrenal findings on CT copied below.    Consults:   IP CONSULT TO PULMONOLOGY  IP CONSULT TO CARDIOLOGY  IP CONSULT TO CASE MANAGEMENT   IP CONSULT TO CARDIOLOGY  IP CONSULT TO CASE MANAGEMENT   IP CONSULT TO REHAB ADMISSION    Significant Diagnostic Studies:   CBC:   Results from last 7 days  Lab Units 17  0616   WBC 10*3/mm3 6.45   RBC 10*6/mm3 4.02     CT CHEST WITHOUT CONTRAST      HISTORY: Possible pneumothorax. Sepsis.      TECHNIQUE: Chest CT includes axial imaging from the thoracic inlet to  the upper abdomen without IV contrast.      COMPARISON: AP chest 2017.      FINDINGS: Endotracheal tube tip is at the isabel and this could be  withdrawn 3 cm for better positioning. A right IJ central venous  catheter tip is in the SVC.      There is dense right lower lobe and middle lobe consolidation with near  complete consolidation. Air bronchograms are present. There is also  moderate to severe left lower lobe  consolidation. Consolidation is also  present in the posterior right upper lobe. No upper lobe pulmonary edema  is present. There is marked cardiomegaly. Scoliotic curvature is present  in the thoracolumbar spine. Chronic multilevel posterior rib deformities  are present. Small right pleural effusion is present.      There is a left adrenal mass or potential hemorrhage measuring 2.8 x 2.2  cm.      IMPRESSION:  1. No pneumothorax.  2. Multilobar consolidation greatest within the lower lobes with air  bronchograms consistent with infiltrate/aspiration.  3. Endotracheal tube tip is at the isabel and this could be withdrawn 3  cm for better positioning.  4. Left adrenal mass or potential hemorrhage measuring approximately 2.8  x 2.2 cm.    Discharge Exam:  Temp:  [97.7 °F (36.5 °C)-98 °F (36.7 °C)] 98 °F (36.7 °C)  Heart Rate:  [62-80] 74  Resp:  [18-24] 24  BP: (120-136)/(56-64) 129/59  GENERAL:  NAD, Aox3  HEENT:  EOMI, nonicteric sclera, short nec  PULMONARY:    Pectus excavatum CTAB, no wheeze, no crackles, no rhonchi, symmetric air entry  CARDIAC:  RRR no MRG, S1 S2  ABD: SNTND BS+  EXT: no c/c/e, pulses symmetric 2+ bilaterally  NEURO:  CNs II-XII intact, no focal deficits     Disposition:  Home    Patient Instructions:      Your medication list      CHANGE how you take these medications       Instructions Last Dose Given Next Dose Due    amLODIPine 5 MG tablet   Commonly known as:  NORVASC   What changed:    - how much to take  - when to take this        Take 2 tablets by mouth Daily.           CONTINUE taking these medications       Instructions Last Dose Given Next Dose Due    aspirin 81 MG tablet              citalopram 10 MG tablet   Commonly known as:  CELEXA        Take 1 tablet by mouth Daily.         levothyroxine 50 MCG tablet   Commonly known as:  SYNTHROID        Take 1 tablet by mouth Daily.         metoprolol succinate XL 50 MG 24 hr tablet   Commonly known as:  TOPROL-XL                   Where to  Get Your Medications      These medications were sent to [facility] - [place], [place] - [address] - [phone]  - [phone] FX  [address] [facility] [address]     Phone:  [phone]    • amLODIPine 5 MG tablet             Follow-up Information     [facility]    Specialty:  [facility] Health Services    Contact information:    [address]  [place] [address]               Medication Reconciliation: Please see electronically completed Med Rec.    Total time spent discharging patient including evaluation, medication reconciliation, arranging follow up, and post hospitalization instructions and education total time exceeds 30 minutes.    Signed:  Charles Garcia MD  4/18/2017  3:58 PM

## 2017-04-18 NOTE — PLAN OF CARE
Problem: Patient Care Overview (Adult)  Goal: Plan of Care Review  Outcome: Ongoing (interventions implemented as appropriate)    04/18/17 0834   Coping/Psychosocial Response Interventions   Plan Of Care Reviewed With patient   Patient Care Overview   Progress improving   Outcome Evaluation   Outcome Summary/Follow up Plan Pt able to increase ambulation distance today with stable vital signs.

## 2017-04-19 NOTE — PAYOR COMM NOTE
"Zurdo Lane (66 y.o. Female)     PLEASE SEE ATTACHED DISCHARGE SUMMARY    REF#[id number]    THANK YOU    MARSHALL ADRIAN,       Date of Birth Social Security Number Address Home Phone MRN    1950  [id number] [id number] [phone] [id number]    Advent Marital Status          None Single       Admission Date Admission Type Admitting Provider Attending Provider Department, Room/Bed    3/31/17 Emergency Rohit Rausch MD  [address], 641/1    Discharge Date Discharge Disposition Discharge Destination        2017 Home-Health Care Svc             Attending Provider: (none)    Allergies:  Epinephrine    Isolation:  None   Infection:  None   Code Status:  Prior    Ht:  59\" (149.9 cm)   Wt:  162 lb 4.8 oz (73.6 kg)    Admission Cmt:  None   Principal Problem:  None                Active Insurance as of 3/31/2017     Primary Coverage     Payor Plan Insurance Group Employer/Plan Group    ANTH BLUE CROSS ANTH BLUE CROSS BLUE SHIELD [facility] [id number]     Payor Plan Address Payor Plan Phone Number Effective From Effective To    [address] [phone] 2016     [place] [address]       Subscriber Name Subscriber Birth Date Member ID       ZURDO LANE 1950 [id number]                 Emergency Contacts      (Rel.) Home Phone Work Phone Mobile Phone    Kandace Vale (Daughter) [phone] -- --               Discharge Summary      Charles Garcia MD at 2017  3:58 PM                                                          PHYSICIAN DISCHARGE SUMMARY                                                                          [facility]    Patient Identification:  Name: Zurdo Lane  Age: 66 y.o.  Sex: female  :  1950  MRN: [id number]  Primary Care Physician: Kiley Wei MD    Admit date: 3/31/2017  Discharge date and time:2017  Discharged Condition: good    Discharge Diagnoses:Active " Problems:    Sepsis    Paroxysmal SVT (supraventricular tachycardia)       Hospital Course: Lupe Becker presented to [facility]  On 3/31/2017 an was treated for acute hypoxic resp failure with mechanical ventilation, abx for severe lobar pneumonia and after prolonged ventilation of almost 7-9 days.  She completed a course of abx.  She will be discharged home on 2L nc with exertion.  She wished to go home with home health.    She will follow up with me in 6 weeks, and will need to follow with her PCP for adrenal findings on CT copied below.    Consults:   IP CONSULT TO PULMONOLOGY  IP CONSULT TO CARDIOLOGY  IP CONSULT TO CASE MANAGEMENT   IP CONSULT TO CARDIOLOGY  IP CONSULT TO CASE MANAGEMENT   IP CONSULT TO REHAB ADMISSION    Significant Diagnostic Studies:   CBC:   Results from last 7 days  Lab Units 04/18/17  0616   WBC 10*3/mm3 6.45   RBC 10*6/mm3 4.02     CT CHEST WITHOUT CONTRAST      HISTORY: Possible pneumothorax. Sepsis.      TECHNIQUE: Chest CT includes axial imaging from the thoracic inlet to  the upper abdomen without IV contrast.      COMPARISON: AP chest 03/31/2017.      FINDINGS: Endotracheal tube tip is at the isabel and this could be  withdrawn 3 cm for better positioning. A right IJ central venous  catheter tip is in the SVC.      There is dense right lower lobe and middle lobe consolidation with near  complete consolidation. Air bronchograms are present. There is also  moderate to severe left lower lobe consolidation. Consolidation is also  present in the posterior right upper lobe. No upper lobe pulmonary edema  is present. There is marked cardiomegaly. Scoliotic curvature is present  in the thoracolumbar spine. Chronic multilevel posterior rib deformities  are present. Small right pleural effusion is present.      There is a left adrenal mass or potential hemorrhage measuring 2.8 x 2.2  cm.      IMPRESSION:  1. No pneumothorax.  2.  Multilobar consolidation greatest within the lower lobes with air  bronchograms consistent with infiltrate/aspiration.  3. Endotracheal tube tip is at the isabel and this could be withdrawn 3  cm for better positioning.  4. Left adrenal mass or potential hemorrhage measuring approximately 2.8  x 2.2 cm.    Discharge Exam:  Temp:  [97.7 °F (36.5 °C)-98 °F (36.7 °C)] 98 °F (36.7 °C)  Heart Rate:  [62-80] 74  Resp:  [18-24] 24  BP: (120-136)/(56-64) 129/59  GENERAL:  NAD, Aox3  HEENT:  EOMI, nonicteric sclera, short nec  PULMONARY:    Pectus excavatum CTAB, no wheeze, no crackles, no rhonchi, symmetric air entry  CARDIAC:  RRR no MRG, S1 S2  ABD: SNTND BS+  EXT: no c/c/e, pulses symmetric 2+ bilaterally  NEURO:  CNs II-XII intact, no focal deficits     Disposition:  Home    Patient Instructions:      Your medication list      CHANGE how you take these medications       Instructions Last Dose Given Next Dose Due    amLODIPine 5 MG tablet   Commonly known as:  NORVASC   What changed:    - how much to take  - when to take this        Take 2 tablets by mouth Daily.           CONTINUE taking these medications       Instructions Last Dose Given Next Dose Due    aspirin 81 MG tablet              citalopram 10 MG tablet   Commonly known as:  CELEXA        Take 1 tablet by mouth Daily.         levothyroxine 50 MCG tablet   Commonly known as:  SYNTHROID        Take 1 tablet by mouth Daily.         metoprolol succinate XL 50 MG 24 hr tablet   Commonly known as:  TOPROL-XL                   Where to Get Your Medications      These medications were sent to [address], [place] - [address] [place] [address] AT [facility] [address] & (KRISTY A - [phone]  - [phone] FX  [address] [place] [address], [address] [place] [address]     Phone:  [phone]    • amLODIPine 5 MG tablet             Follow-up Information     Follow up with [facility] [place] [facility] [place] .    Specialty:  Home Health  Services    Contact information:    [address] [address]               Medication Reconciliation: Please see electronically completed Med Rec.    Total time spent discharging patient including evaluation, medication reconciliation, arranging follow up, and post hospitalization instructions and education total time exceeds 30 minutes.    Signed:  Charles Garcia MD  4/18/2017  3:58 PM       Electronically signed by Charles Garcia MD at 4/18/2017  4:02 PM

## 2017-05-15 RX ORDER — AMLODIPINE BESYLATE 5 MG/1
10 TABLET ORAL DAILY
Qty: 30 TABLET | Refills: 2 | Status: SHIPPED | OUTPATIENT
Start: 2017-05-15 | End: 2017-07-06 | Stop reason: SDUPTHER

## 2017-05-30 ENCOUNTER — TRANSCRIBE ORDERS (OUTPATIENT)
Dept: ADMINISTRATIVE | Facility: HOSPITAL | Age: 67
End: 2017-05-30

## 2017-05-30 DIAGNOSIS — R91.8 PULMONARY INFILTRATE: Primary | ICD-10-CM

## 2017-06-01 DIAGNOSIS — R60.0 PEDAL EDEMA: Primary | ICD-10-CM

## 2017-06-01 RX ORDER — FUROSEMIDE 40 MG/1
40 TABLET ORAL DAILY
Qty: 90 TABLET | Refills: 1 | Status: SHIPPED | OUTPATIENT
Start: 2017-06-01 | End: 2018-01-31 | Stop reason: SDUPTHER

## 2017-06-09 ENCOUNTER — HOSPITAL ENCOUNTER (OUTPATIENT)
Dept: CT IMAGING | Facility: HOSPITAL | Age: 67
Discharge: HOME | End: 2017-06-09
Attending: INTERNAL MEDICINE | Admitting: INTERNAL MEDICINE

## 2017-06-09 DIAGNOSIS — R91.8 PULMONARY INFILTRATE: ICD-10-CM

## 2017-06-09 PROCEDURE — 71250 CT THORAX DX C-: CPT

## 2017-06-29 ENCOUNTER — TRANSCRIBE ORDERS (OUTPATIENT)
Dept: ADMINISTRATIVE | Facility: HOSPITAL | Age: 67
End: 2017-06-29

## 2017-06-29 DIAGNOSIS — R91.8 PULMONARY INFILTRATE: Primary | ICD-10-CM

## 2017-06-30 ENCOUNTER — OFFICE VISIT (OUTPATIENT)
Dept: FAMILY MEDICINE CLINIC | Facility: CLINIC | Age: 67
End: 2017-06-30

## 2017-06-30 VITALS
HEART RATE: 87 BPM | OXYGEN SATURATION: 98 % | SYSTOLIC BLOOD PRESSURE: 138 MMHG | HEIGHT: 59 IN | DIASTOLIC BLOOD PRESSURE: 80 MMHG | BODY MASS INDEX: 29.84 KG/M2 | WEIGHT: 148 LBS

## 2017-06-30 DIAGNOSIS — R10.11 RUQ PAIN: Primary | ICD-10-CM

## 2017-06-30 DIAGNOSIS — E03.8 ADULT ONSET HYPOTHYROIDISM: ICD-10-CM

## 2017-06-30 DIAGNOSIS — R09.02 HYPOXIA: ICD-10-CM

## 2017-06-30 DIAGNOSIS — Z00.00 HEALTHCARE MAINTENANCE: ICD-10-CM

## 2017-06-30 DIAGNOSIS — F41.8 ANXIETY ABOUT HEALTH: ICD-10-CM

## 2017-06-30 DIAGNOSIS — R00.0 FAST HEART BEAT: ICD-10-CM

## 2017-06-30 PROCEDURE — 99214 OFFICE O/P EST MOD 30 MIN: CPT | Performed by: FAMILY MEDICINE

## 2017-06-30 RX ORDER — LEVOTHYROXINE SODIUM 0.05 MG/1
50 TABLET ORAL DAILY
Qty: 90 TABLET | Refills: 0 | Status: SHIPPED | OUTPATIENT
Start: 2017-06-30 | End: 2017-09-30 | Stop reason: SDUPTHER

## 2017-07-03 PROBLEM — E27.9 ADRENAL NODULE: Status: ACTIVE | Noted: 2017-07-03

## 2017-07-03 PROBLEM — E27.8 ADRENAL NODULE: Status: ACTIVE | Noted: 2017-07-03

## 2017-07-03 PROBLEM — A41.9 SEPSIS (HCC): Status: RESOLVED | Noted: 2017-03-31 | Resolved: 2017-07-03

## 2017-07-03 NOTE — PATIENT INSTRUCTIONS
Will r/o GB disease with a GB US and try to get that with the next scan. Reassurance regarding stable adrenal nodule.

## 2017-07-06 RX ORDER — AMLODIPINE BESYLATE 5 MG/1
10 TABLET ORAL DAILY
Qty: 180 TABLET | Refills: 0 | Status: SHIPPED | OUTPATIENT
Start: 2017-07-06 | End: 2019-12-17 | Stop reason: SDUPTHER

## 2017-07-10 LAB — TSH SERPL DL<=0.005 MIU/L-ACNC: 6.03 MIU/ML (ref 0.27–4.2)

## 2017-07-14 DIAGNOSIS — E03.8 ADULT ONSET HYPOTHYROIDISM: Primary | ICD-10-CM

## 2017-07-18 ENCOUNTER — HOSPITAL ENCOUNTER (OUTPATIENT)
Dept: CT IMAGING | Facility: HOSPITAL | Age: 67
Discharge: HOME | End: 2017-07-18
Attending: INTERNAL MEDICINE

## 2017-07-18 ENCOUNTER — HOSPITAL ENCOUNTER (OUTPATIENT)
Dept: ULTRASOUND IMAGING | Facility: HOSPITAL | Age: 67
Discharge: HOME | End: 2017-07-18

## 2017-07-18 ENCOUNTER — HOSPITAL ENCOUNTER (OUTPATIENT)
Dept: ULTRASOUND IMAGING | Facility: HOSPITAL | Age: 67
Discharge: HOME | End: 2017-07-18
Attending: INTERNAL MEDICINE | Admitting: INTERNAL MEDICINE

## 2017-07-18 VITALS
RESPIRATION RATE: 18 BRPM | HEIGHT: 60 IN | BODY MASS INDEX: 27.88 KG/M2 | HEART RATE: 81 BPM | SYSTOLIC BLOOD PRESSURE: 152 MMHG | OXYGEN SATURATION: 98 % | TEMPERATURE: 97.9 F | DIASTOLIC BLOOD PRESSURE: 79 MMHG | WEIGHT: 142 LBS

## 2017-07-18 DIAGNOSIS — R91.8 PULMONARY INFILTRATE: ICD-10-CM

## 2017-07-18 DIAGNOSIS — R10.11 RUQ PAIN: ICD-10-CM

## 2017-07-18 PROCEDURE — 76604 US EXAM CHEST: CPT

## 2017-07-18 PROCEDURE — 71250 CT THORAX DX C-: CPT

## 2017-07-18 PROCEDURE — 76705 ECHO EXAM OF ABDOMEN: CPT

## 2017-07-18 RX ORDER — LIDOCAINE HYDROCHLORIDE 10 MG/ML
20 INJECTION, SOLUTION INFILTRATION; PERINEURAL ONCE
Status: DISCONTINUED | OUTPATIENT
Start: 2017-07-18 | End: 2017-07-20 | Stop reason: HOSPADM

## 2017-07-19 ENCOUNTER — TELEPHONE (OUTPATIENT)
Dept: INTERVENTIONAL RADIOLOGY/VASCULAR | Facility: HOSPITAL | Age: 67
End: 2017-07-19

## 2017-08-01 DIAGNOSIS — R10.11 RUQ PAIN: Primary | ICD-10-CM

## 2017-08-10 ENCOUNTER — HOSPITAL ENCOUNTER (OUTPATIENT)
Dept: NUCLEAR MEDICINE | Facility: HOSPITAL | Age: 67
Discharge: HOME | End: 2017-08-10

## 2017-08-10 DIAGNOSIS — R10.11 RUQ PAIN: ICD-10-CM

## 2017-08-10 PROCEDURE — 25010000002 SINCALIDE PER 5 MCG: Performed by: FAMILY MEDICINE

## 2017-08-10 PROCEDURE — A9537 TC99M MEBROFENIN: HCPCS | Performed by: FAMILY MEDICINE

## 2017-08-10 PROCEDURE — 0 TECHNETIUM TC 99M MEBROFENIN KIT: Performed by: FAMILY MEDICINE

## 2017-08-10 PROCEDURE — 78227 HEPATOBIL SYST IMAGE W/DRUG: CPT

## 2017-08-10 RX ORDER — KIT FOR THE PREPARATION OF TECHNETIUM TC 99M MEBROFENIN 45 MG/10ML
1 INJECTION, POWDER, LYOPHILIZED, FOR SOLUTION INTRAVENOUS
Status: COMPLETED | OUTPATIENT
Start: 2017-08-10 | End: 2017-08-10

## 2017-08-10 RX ADMIN — MEBROFENIN 1 DOSE: 45 INJECTION, POWDER, LYOPHILIZED, FOR SOLUTION INTRAVENOUS at 09:30

## 2017-08-10 RX ADMIN — SINCALIDE 1.3 MCG: 5 INJECTION, POWDER, LYOPHILIZED, FOR SOLUTION INTRAVENOUS at 10:30

## 2017-08-25 ENCOUNTER — RESULTS ENCOUNTER (OUTPATIENT)
Dept: FAMILY MEDICINE CLINIC | Facility: CLINIC | Age: 67
End: 2017-08-25

## 2017-08-25 DIAGNOSIS — E03.8 ADULT ONSET HYPOTHYROIDISM: ICD-10-CM

## 2017-09-08 ENCOUNTER — OFFICE VISIT (OUTPATIENT)
Dept: SURGERY | Facility: CLINIC | Age: 67
End: 2017-09-08

## 2017-09-08 VITALS
TEMPERATURE: 98.2 F | HEART RATE: 82 BPM | BODY MASS INDEX: 29.7 KG/M2 | HEIGHT: 60 IN | DIASTOLIC BLOOD PRESSURE: 72 MMHG | OXYGEN SATURATION: 97 % | SYSTOLIC BLOOD PRESSURE: 142 MMHG | WEIGHT: 151.3 LBS

## 2017-09-08 DIAGNOSIS — Z80.0 FAMILY HISTORY OF COLON CANCER: Primary | ICD-10-CM

## 2017-09-08 DIAGNOSIS — R19.4 CHANGE IN STOOL HABITS: ICD-10-CM

## 2017-09-08 PROCEDURE — 99204 OFFICE O/P NEW MOD 45 MIN: CPT | Performed by: COLON & RECTAL SURGERY

## 2017-09-08 RX ORDER — SODIUM CHLORIDE, SODIUM LACTATE, POTASSIUM CHLORIDE, CALCIUM CHLORIDE 600; 310; 30; 20 MG/100ML; MG/100ML; MG/100ML; MG/100ML
30 INJECTION, SOLUTION INTRAVENOUS CONTINUOUS
Status: CANCELLED | OUTPATIENT
Start: 2017-10-26

## 2017-09-08 RX ORDER — METOPROLOL SUCCINATE 50 MG/1
50 TABLET, EXTENDED RELEASE ORAL DAILY
Qty: 90 TABLET | Refills: 0 | Status: SHIPPED | OUTPATIENT
Start: 2017-09-08 | End: 2019-12-17 | Stop reason: SDUPTHER

## 2017-09-08 NOTE — PROGRESS NOTES
Lupe Becker is a 66 y.o. female who is seen as a consult for Diarrhea.      HPI:    Pt c/o mucus per rectum beginning in July  Had explosive diarrhea at the same time  Low abdominal pain while this was going on  Occurred for a month  No f/c  No n/v    Symptoms resolved now    Endorses blood per rectum if she has a large stool: about once every couple of months    Usually has 1-2 BMs per day    Stools usually formed  Does occasionally have a hard stool    No hx anorectal surgery    Uses hydrocortisone for irritation after large stool for a couple of days every couple of months  Uses internally  Helpful for irritation    Most recent cy  Dr. Aly: tics, no polyps per patient    FamHx: father colon cancer, esophageal cancer diag age 79      Past Medical History:   Diagnosis Date   • Abdominal pain    • Anxiety     about health   • Bicornate uterus 10/10/2016   • Breast lump 10/10/2016    Description: right   • Cellulitis of left lower extremity    • Constipation    • Disease of thyroid gland     adult onset hypothyroidism   • Diverticulosis    • Dysrhythmia     WPW syndrome   •  3, not currently pregnant      A2   • Hypertension    • Hypoxia    • Mitral valve regurgitation    • Palpitations    • Pleural effusion     Recent pneumonia   • Pneumonia 2017   • Rectal bleeding    • Scoliosis    • Tachycardia    • Lis-Parkinson-White (WPW) pattern        Past Surgical History:   Procedure Laterality Date   • ADENOIDECTOMY     • APPENDECTOMY     • CARDIAC ABLATION     • COLONOSCOPY N/A 2011    Severiano Aly MD; [facility]; WNL other than diverticula pocket   • COLONOSCOPY N/A 1994    performed at [facility]; Severiano Aly MD   • RHINOPLASTY     • TONSILLECTOMY         Social History:   reports that she has been smoking.  She has smoked for the past 6.00 years. She has never used smokeless tobacco. She reports that she drinks alcohol. She reports that she does not use illicit  drugs.      Marriage status: Single    Family History   Problem Relation Age of Onset   • Ovarian cancer Mother    • Cancer Mother       from ovarian cancer   • Cancer Father    • Colon cancer Father    • No Known Problems Brother    • No Known Problems Daughter    • Heart disease Maternal Grandmother          Current Outpatient Prescriptions:   •  amLODIPine (NORVASC) 5 MG tablet, Take 2 tablets by mouth Daily., Disp: 180 tablet, Rfl: 0  •  aspirin 81 MG tablet, Take 81 mg by mouth Daily., Disp: , Rfl:   •  furosemide (LASIX) 40 MG tablet, Take 1 tablet by mouth Daily., Disp: 90 tablet, Rfl: 1  •  levothyroxine (SYNTHROID) 50 MCG tablet, Take 1 tablet by mouth Daily., Disp: 90 tablet, Rfl: 0  •  metoprolol succinate XL (TOPROL-XL) 50 MG 24 hr tablet, Take 50 mg by mouth., Disp: , Rfl:     Allergy  Epinephrine    Review of Systems   Constitution: Negative for decreased appetite, weakness and weight gain.   HENT: Negative for congestion, headaches, hearing loss and hoarse voice.    Eyes: Positive for visual disturbance. Negative for blurred vision and discharge.   Cardiovascular: Negative for chest pain, cyanosis and leg swelling.   Respiratory: Positive for shortness of breath. Negative for cough, sleep disturbances due to breathing and snoring.    Endocrine: Negative for cold intolerance and heat intolerance.   Hematologic/Lymphatic: Bruises/bleeds easily.   Skin: Negative for itching, poor wound healing and skin cancer.   Musculoskeletal: Negative for arthritis, back pain, joint pain and joint swelling.   Gastrointestinal: Positive for abdominal pain and change in bowel habit. Negative for bowel incontinence and constipation.   Genitourinary: Negative for bladder incontinence, dysuria and hematuria.   Neurological: Negative for brief paralysis, excessive daytime sleepiness, dizziness, focal weakness and light-headedness.   Psychiatric/Behavioral: Negative for altered mental status and hallucinations. The  patient does not have insomnia.    Allergic/Immunologic: Negative for HIV exposure and persistent infections.   All other systems reviewed and are negative.      Vitals:    09/08/17 1410   BP: 142/72   Pulse: 82   Temp: 98.2 °F (36.8 °C)   SpO2: 97%     Body mass index is 29.55 kg/(m^2).    Physical Exam   Constitutional: She is oriented to person, place, and time. She appears well-developed and well-nourished. No distress.   kyphosis   HENT:   Head: Normocephalic and atraumatic.   Nose: Nose normal.   Mouth/Throat: Oropharynx is clear and moist.   Eyes: Conjunctivae and EOM are normal. Pupils are equal, round, and reactive to light.   Neck: Normal range of motion. No tracheal deviation present.   Pulmonary/Chest: Effort normal and breath sounds normal. No respiratory distress.   Abdominal: Soft. Bowel sounds are normal. She exhibits no distension.   Musculoskeletal: Normal range of motion. She exhibits no edema or deformity.   Neurological: She is alert and oriented to person, place, and time. No cranial nerve deficit. Coordination and gait normal.   Skin: Skin is warm and dry.   Psychiatric: She has a normal mood and affect. Her behavior is normal. Judgment normal.       Review of Medical Record:  I reviewed Dr. Ayl records    Assessment:  1. Family history of colon cancer    2. Change in stool habits        Plan:    Change in bowel habits: resolved    I recommend fiber therapy and detailed and gave written instructions on how to achieve a high fiber diet.    For the family history of colon cancer, I recommend doing a colonoscopy.  I described the patient risks benefits and alternatives and she wishes to proceed.       Scribed for Yamel Harrington MD by Kandace Astorga PA-C 9/8/2017  This patient was evaluated by me, recommendations made, documentation reviewed, edited, and revised by me, Yamel Harrington MD

## 2017-09-30 DIAGNOSIS — E03.8 ADULT ONSET HYPOTHYROIDISM: ICD-10-CM

## 2017-10-02 RX ORDER — LEVOTHYROXINE SODIUM 0.05 MG/1
TABLET ORAL
Qty: 90 TABLET | Refills: 0 | Status: SHIPPED | OUTPATIENT
Start: 2017-10-02 | End: 2018-06-04 | Stop reason: SDUPTHER

## 2017-10-26 ENCOUNTER — ANESTHESIA (OUTPATIENT)
Dept: GASTROENTEROLOGY | Facility: HOSPITAL | Age: 67
End: 2017-10-26

## 2017-10-26 ENCOUNTER — HOSPITAL ENCOUNTER (OUTPATIENT)
Facility: HOSPITAL | Age: 67
Setting detail: HOSPITAL OUTPATIENT SURGERY
Discharge: HOME | End: 2017-10-26
Attending: COLON & RECTAL SURGERY | Admitting: COLON & RECTAL SURGERY

## 2017-10-26 ENCOUNTER — ANESTHESIA EVENT (OUTPATIENT)
Dept: GASTROENTEROLOGY | Facility: HOSPITAL | Age: 67
End: 2017-10-26

## 2017-10-26 VITALS
OXYGEN SATURATION: 92 % | RESPIRATION RATE: 18 BRPM | HEIGHT: 59 IN | BODY MASS INDEX: 30.52 KG/M2 | HEART RATE: 78 BPM | WEIGHT: 151.38 LBS | SYSTOLIC BLOOD PRESSURE: 160 MMHG | TEMPERATURE: 98 F | DIASTOLIC BLOOD PRESSURE: 86 MMHG

## 2017-10-26 DIAGNOSIS — Z80.0 FAMILY HISTORY OF COLON CANCER: ICD-10-CM

## 2017-10-26 PROCEDURE — 25010000002 PROPOFOL 10 MG/ML EMULSION: Performed by: ANESTHESIOLOGY

## 2017-10-26 PROCEDURE — 88305 TISSUE EXAM BY PATHOLOGIST: CPT | Performed by: COLON & RECTAL SURGERY

## 2017-10-26 PROCEDURE — 45385 COLONOSCOPY W/LESION REMOVAL: CPT | Performed by: COLON & RECTAL SURGERY

## 2017-10-26 DEVICE — DEV CLIP ENDO RESOLUTION360 CONTRL ROT 235CM: Type: IMPLANTABLE DEVICE | Site: TRANSVERSE COLON | Status: FUNCTIONAL

## 2017-10-26 RX ORDER — PROPOFOL 10 MG/ML
VIAL (ML) INTRAVENOUS CONTINUOUS PRN
Status: DISCONTINUED | OUTPATIENT
Start: 2017-10-26 | End: 2017-10-26 | Stop reason: SURG

## 2017-10-26 RX ORDER — PROPOFOL 10 MG/ML
VIAL (ML) INTRAVENOUS AS NEEDED
Status: DISCONTINUED | OUTPATIENT
Start: 2017-10-26 | End: 2017-10-26 | Stop reason: SURG

## 2017-10-26 RX ORDER — SODIUM CHLORIDE, SODIUM LACTATE, POTASSIUM CHLORIDE, CALCIUM CHLORIDE 600; 310; 30; 20 MG/100ML; MG/100ML; MG/100ML; MG/100ML
30 INJECTION, SOLUTION INTRAVENOUS CONTINUOUS
Status: DISCONTINUED | OUTPATIENT
Start: 2017-10-26 | End: 2017-10-26 | Stop reason: HOSPADM

## 2017-10-26 RX ORDER — LIDOCAINE HYDROCHLORIDE 20 MG/ML
INJECTION, SOLUTION INFILTRATION; PERINEURAL AS NEEDED
Status: DISCONTINUED | OUTPATIENT
Start: 2017-10-26 | End: 2017-10-26 | Stop reason: SURG

## 2017-10-26 RX ADMIN — LIDOCAINE HYDROCHLORIDE 60 MG: 20 INJECTION, SOLUTION INFILTRATION; PERINEURAL at 14:25

## 2017-10-26 RX ADMIN — SODIUM CHLORIDE, POTASSIUM CHLORIDE, SODIUM LACTATE AND CALCIUM CHLORIDE 30 ML/HR: 600; 310; 30; 20 INJECTION, SOLUTION INTRAVENOUS at 14:09

## 2017-10-26 RX ADMIN — PROPOFOL 50 MG: 10 INJECTION, EMULSION INTRAVENOUS at 14:29

## 2017-10-26 RX ADMIN — PROPOFOL 100 MCG/KG/MIN: 10 INJECTION, EMULSION INTRAVENOUS at 14:25

## 2017-10-26 RX ADMIN — SODIUM CHLORIDE, POTASSIUM CHLORIDE, SODIUM LACTATE AND CALCIUM CHLORIDE: 600; 310; 30; 20 INJECTION, SOLUTION INTRAVENOUS at 14:25

## 2017-10-26 NOTE — DISCHARGE INSTRUCTIONS
For the next 24 hours patient needs to be with a responsible adult.    For 24 hours DO NOT drive, operate machinery, appliances, drink alcohol, make important decisions or sign legal documents.    Start with a light or bland diet and advance to regular diet as tolerated.    Follow recommendations on procedure report provided by your doctor.    Call Dr Harrington for problems [phone]    Problems may include but not limited to: large amounts of bleeding, trouble breathing, repeated vomiting, severe unrelieved pain, fever or chills.

## 2017-10-26 NOTE — H&P
Lupe Becker is a 67 y.o. female  who is referred by Yamel Harrington MD for a colonoscopy. She is an  has a history of family history of colon cancer.     She denies any change in bowel function, melena, or hematochezia.    Past Medical History:   Diagnosis Date   • Abdominal pain    • Anxiety     about health   • Bicornate uterus 10/10/2016   • Breast lump 10/10/2016    Description: right   • Cellulitis of left lower extremity    • Constipation    • Disease of thyroid gland     adult onset hypothyroidism   • Diverticulosis    • Dysrhythmia     WPW syndrome   •  3, not currently pregnant      A2   • Hypertension    • Hypoxia    • Mitral valve regurgitation    • Palpitations    • Pleural effusion     Recent pneumonia   • Pneumonia 2017   • Rectal bleeding    • Scoliosis    • Tachycardia    • Lis-Parkinson-White (WPW) pattern        Past Surgical History:   Procedure Laterality Date   • ADENOIDECTOMY     • APPENDECTOMY     • CARDIAC ABLATION     • COLONOSCOPY N/A 2011    S[facility] Aly MD; [facility]; WNL other than diverticula pocket   • COLONOSCOPY N/A 1994    performed at [facility]; Severiano Aly MD   • RHINOPLASTY     • TONSILLECTOMY         Prescriptions Prior to Admission   Medication Sig Dispense Refill Last Dose   • amLODIPine (NORVASC) 5 MG tablet Take 2 tablets by mouth Daily. 180 tablet 0 10/26/2017 at Unknown time   • metoprolol succinate XL (TOPROL-XL) 50 MG 24 hr tablet Take 1 tablet by mouth Daily. 90 tablet 0 10/26/2017 at Unknown time   • aspirin 81 MG tablet Take 81 mg by mouth Daily.   10/24/2017   • furosemide (LASIX) 40 MG tablet Take 1 tablet by mouth Daily. 90 tablet 1 10/24/2017   • levothyroxine (SYNTHROID, LEVOTHROID) 50 MCG tablet TAKE ONE TABLET BY MOUTH DAILY 90 tablet 0 10/24/2017       Allergies   Allergen Reactions   • Epinephrine        Family History   Problem Relation Age of Onset   • Ovarian cancer Mother    • Cancer Mother       from  ovarian cancer   • Cancer Father    • Colon cancer Father    • No Known Problems Brother    • No Known Problems Daughter    • Heart disease Maternal Grandmother        Social History     Social History   • Marital status: Single     Spouse name: N/A   • Number of children: N/A   • Years of education: N/A     Occupational History   • Not on file.     Social History Main Topics   • Smoking status: Former Smoker     Years: 6.00   • Smokeless tobacco: Never Used   • Alcohol use Yes      Comment: very rarely   • Drug use: No   • Sexual activity: Defer     Other Topics Concern   • Not on file     Social History Narrative       Review of Systems   Gastrointestinal: Negative for abdominal pain, nausea and vomiting.   All other systems reviewed and are negative.      Vitals:    10/26/17 1355   BP: 169/87   Pulse: 92   Resp: 19   Temp: 98.4 °F (36.9 °C)   SpO2: 95%         Physical Exam   Constitutional: She is oriented to person, place, and time. She appears well-developed and well-nourished.   HENT:   Head: Normocephalic and atraumatic.   Eyes: EOM are normal. Pupils are equal, round, and reactive to light.   Cardiovascular: Regular rhythm.    Pulmonary/Chest: Effort normal.   Abdominal: Soft. She exhibits no distension.   Musculoskeletal: Normal range of motion.   Neurological: She is alert and oriented to person, place, and time.   Skin: Skin is warm and dry.   Psychiatric: Judgment and thought content normal.         Assessment/Plan      family history of colon cancer.         I recommend colonoscopy.  I described risks, benefits of the procedure with the patient including but not limited to bleeding, infection, possibility of perforation and possible polypectomy. All of the patient's questions were answered and they would like to proceed with the above recommendations.

## 2017-10-26 NOTE — ANESTHESIA POSTPROCEDURE EVALUATION
"Patient: Lupe Becker    Procedure Summary     Date Anesthesia Start Anesthesia Stop Room / Location    10/26/17 1426 1454  [facility]       Procedure Diagnosis Surgeon Provider    COLONOSCOPY TO CECUM WITH HOT SNARE POLYPECTOMY AND RESOLUTION CLIP X 1 PLACEMENT TO TRANSVERSE COLON POLYP SITE (N/A ) Family history of colon cancer  (Family history of colon cancer [Z80.0]) MD Quinton Britt MD          Anesthesia Type: MAC  Last vitals  BP   160/86 (10/26/17 1514)   Temp   36.7 °C (98 °F) (10/26/17 1454)   Pulse   78 (10/26/17 1514)   Resp   18 (10/26/17 1514)     SpO2   92 % (10/26/17 1514)     Post Anesthesia Care and Evaluation    Patient location during evaluation: PACU  Patient participation: complete - patient participated  Level of consciousness: awake  Pain score: 0  Pain management: adequate  Airway patency: patent  Anesthetic complications: No anesthetic complications  PONV Status: none  Cardiovascular status: acceptable  Respiratory status: acceptable  Hydration status: acceptable    Comments: /86 (BP Location: Left arm, Patient Position: Sitting)  Pulse 78  Temp 36.7 °C (98 °F) (Oral)   Resp 18  Ht 59\" (149.9 cm)  Wt 151 lb 6 oz (68.7 kg)  SpO2 92%  BMI 30.57 kg/m2      "

## 2017-10-26 NOTE — ANESTHESIA PREPROCEDURE EVALUATION
Anesthesia Evaluation     Patient summary reviewed and Nursing notes reviewed          Airway   Mallampati: I  TM distance: <3 FB  Neck ROM: full  no difficulty expected  Dental - normal exam     Pulmonary - normal exam   (+) pneumonia ,   Cardiovascular - normal exam    (+) hypertension, valvular problems/murmurs, dysrhythmias Tachycardia, PVC,       Neuro/Psych  (+) psychiatric history,    GI/Hepatic/Renal/Endo    (+)  hypothyroidism,     Musculoskeletal (-) negative ROS    Abdominal  - normal exam    Bowel sounds: normal.   Substance History - negative use     OB/GYN negative ob/gyn ROS   (-)  Pregnant        Other                                        Anesthesia Plan    ASA 3     MAC     Anesthetic plan and risks discussed with patient.

## 2017-10-26 NOTE — PLAN OF CARE
Problem: Patient Care Overview (Adult)  Goal: Plan of Care Review  Outcome: Ongoing (interventions implemented as appropriate)    10/26/17 1348   Coping/Psychosocial Response Interventions   Plan Of Care Reviewed With patient   Patient Care Overview   Progress improving       Goal: Adult Individualization and Mutuality  Outcome: Ongoing (interventions implemented as appropriate)  Goal: Discharge Needs Assessment  Outcome: Ongoing (interventions implemented as appropriate)    10/26/17 1348   Discharge Needs Assessment   Concerns To Be Addressed no discharge needs identified   Discharge Disposition home or self-care   Living Environment   Transportation Available car;family or friend will provide         Problem: GI Endoscopy (Adult)  Goal: Signs and Symptoms of Listed Potential Problems Will be Absent or Manageable (GI Endoscopy)  Outcome: Ongoing (interventions implemented as appropriate)    10/26/17 1348   GI Endoscopy   Problems Present (GI Endoscopy) none

## 2017-10-27 LAB
CYTO UR: NORMAL
LAB AP CASE REPORT: NORMAL
Lab: NORMAL
PATH REPORT.FINAL DX SPEC: NORMAL
PATH REPORT.GROSS SPEC: NORMAL

## 2018-01-31 DIAGNOSIS — E03.8 ADULT ONSET HYPOTHYROIDISM: ICD-10-CM

## 2018-01-31 DIAGNOSIS — R60.0 PEDAL EDEMA: ICD-10-CM

## 2018-01-31 RX ORDER — LEVOTHYROXINE SODIUM 0.05 MG/1
TABLET ORAL
Qty: 90 TABLET | Refills: 0 | Status: SHIPPED | OUTPATIENT
Start: 2018-01-31 | End: 2018-06-04 | Stop reason: SDUPTHER

## 2018-01-31 RX ORDER — FUROSEMIDE 40 MG/1
TABLET ORAL
Qty: 90 TABLET | Refills: 0 | Status: SHIPPED | OUTPATIENT
Start: 2018-01-31 | End: 2018-07-06

## 2018-05-26 DIAGNOSIS — E03.8 ADULT ONSET HYPOTHYROIDISM: ICD-10-CM

## 2018-05-26 DIAGNOSIS — R60.0 PEDAL EDEMA: ICD-10-CM

## 2018-05-29 RX ORDER — LEVOTHYROXINE SODIUM 0.05 MG/1
TABLET ORAL
Qty: 90 TABLET | Refills: 0 | OUTPATIENT
Start: 2018-05-29

## 2018-05-29 RX ORDER — FUROSEMIDE 40 MG/1
TABLET ORAL
Qty: 90 TABLET | Refills: 0 | OUTPATIENT
Start: 2018-05-29

## 2018-06-03 DIAGNOSIS — E03.8 ADULT ONSET HYPOTHYROIDISM: ICD-10-CM

## 2018-06-03 RX ORDER — LEVOTHYROXINE SODIUM 0.05 MG/1
TABLET ORAL
Qty: 90 TABLET | Refills: 0 | Status: CANCELLED | OUTPATIENT
Start: 2018-06-03

## 2018-06-04 DIAGNOSIS — E03.8 ADULT ONSET HYPOTHYROIDISM: ICD-10-CM

## 2018-06-04 RX ORDER — LEVOTHYROXINE SODIUM 0.05 MG/1
50 TABLET ORAL DAILY
Qty: 30 TABLET | Refills: 0 | Status: SHIPPED | OUTPATIENT
Start: 2018-06-04 | End: 2018-07-14 | Stop reason: SDUPTHER

## 2018-07-06 ENCOUNTER — OFFICE VISIT (OUTPATIENT)
Dept: FAMILY MEDICINE CLINIC | Facility: CLINIC | Age: 68
End: 2018-07-06

## 2018-07-06 VITALS
WEIGHT: 165 LBS | SYSTOLIC BLOOD PRESSURE: 156 MMHG | OXYGEN SATURATION: 92 % | RESPIRATION RATE: 16 BRPM | HEIGHT: 59 IN | BODY MASS INDEX: 33.26 KG/M2 | HEART RATE: 74 BPM | DIASTOLIC BLOOD PRESSURE: 92 MMHG

## 2018-07-06 DIAGNOSIS — I10 BENIGN ESSENTIAL HYPERTENSION: ICD-10-CM

## 2018-07-06 DIAGNOSIS — E03.8 ADULT ONSET HYPOTHYROIDISM: ICD-10-CM

## 2018-07-06 DIAGNOSIS — L25.5 RHUS DERMATITIS: Primary | ICD-10-CM

## 2018-07-06 DIAGNOSIS — Z00.00 ROUTINE HEALTH MAINTENANCE: ICD-10-CM

## 2018-07-06 LAB
ALBUMIN SERPL-MCNC: 4.8 G/DL (ref 3.5–5.2)
ALBUMIN/GLOB SERPL: 1.7 G/DL
ALP SERPL-CCNC: 97 U/L (ref 39–117)
ALT SERPL-CCNC: 8 U/L (ref 1–33)
AST SERPL-CCNC: 10 U/L (ref 1–32)
BASOPHILS # BLD AUTO: 0.03 10*3/MM3 (ref 0–0.2)
BASOPHILS NFR BLD AUTO: 0.4 % (ref 0–1.5)
BILIRUB SERPL-MCNC: 0.3 MG/DL (ref 0.1–1.2)
BUN SERPL-MCNC: 12 MG/DL (ref 8–23)
BUN/CREAT SERPL: 15.4 (ref 7–25)
CALCIUM SERPL-MCNC: 9.1 MG/DL (ref 8.6–10.5)
CHLORIDE SERPL-SCNC: 96 MMOL/L (ref 98–107)
CHOLEST SERPL-MCNC: 243 MG/DL (ref 0–200)
CO2 SERPL-SCNC: 31.8 MMOL/L (ref 22–29)
CREAT SERPL-MCNC: 0.78 MG/DL (ref 0.57–1)
EOSINOPHIL # BLD AUTO: 0.24 10*3/MM3 (ref 0–0.7)
EOSINOPHIL NFR BLD AUTO: 3.1 % (ref 0.3–6.2)
ERYTHROCYTE [DISTWIDTH] IN BLOOD BY AUTOMATED COUNT: 14 % (ref 11.7–13)
GLOBULIN SER CALC-MCNC: 2.9 GM/DL
GLUCOSE SERPL-MCNC: 101 MG/DL (ref 65–99)
HCT VFR BLD AUTO: 41.5 % (ref 35.6–45.5)
HDLC SERPL-MCNC: 60 MG/DL (ref 40–60)
HGB BLD-MCNC: 12.5 G/DL (ref 11.9–15.5)
IMM GRANULOCYTES # BLD: 0.04 10*3/MM3 (ref 0–0.03)
IMM GRANULOCYTES NFR BLD: 0.5 % (ref 0–0.5)
LDLC SERPL CALC-MCNC: 143 MG/DL (ref 0–100)
LDLC/HDLC SERPL: 2.38 {RATIO}
LYMPHOCYTES # BLD AUTO: 1.92 10*3/MM3 (ref 0.9–4.8)
LYMPHOCYTES NFR BLD AUTO: 24.9 % (ref 19.6–45.3)
MCH RBC QN AUTO: 26.8 PG (ref 26.9–32)
MCHC RBC AUTO-ENTMCNC: 30.1 G/DL (ref 32.4–36.3)
MCV RBC AUTO: 88.9 FL (ref 80.5–98.2)
MONOCYTES # BLD AUTO: 0.65 10*3/MM3 (ref 0.2–1.2)
MONOCYTES NFR BLD AUTO: 8.4 % (ref 5–12)
NEUTROPHILS # BLD AUTO: 4.86 10*3/MM3 (ref 1.9–8.1)
NEUTROPHILS NFR BLD AUTO: 63.2 % (ref 42.7–76)
PLATELET # BLD AUTO: 312 10*3/MM3 (ref 140–500)
POTASSIUM SERPL-SCNC: 4.4 MMOL/L (ref 3.5–5.2)
PROT SERPL-MCNC: 7.7 G/DL (ref 6–8.5)
RBC # BLD AUTO: 4.67 10*6/MM3 (ref 3.9–5.2)
SODIUM SERPL-SCNC: 139 MMOL/L (ref 136–145)
TRIGL SERPL-MCNC: 200 MG/DL (ref 0–150)
TSH SERPL DL<=0.005 MIU/L-ACNC: 23.43 MIU/ML (ref 0.27–4.2)
VLDLC SERPL CALC-MCNC: 40 MG/DL (ref 5–40)
WBC # BLD AUTO: 7.7 10*3/MM3 (ref 4.5–10.7)

## 2018-07-06 PROCEDURE — 90471 IMMUNIZATION ADMIN: CPT | Performed by: FAMILY MEDICINE

## 2018-07-06 PROCEDURE — 90732 PPSV23 VACC 2 YRS+ SUBQ/IM: CPT | Performed by: FAMILY MEDICINE

## 2018-07-06 PROCEDURE — 99214 OFFICE O/P EST MOD 30 MIN: CPT | Performed by: FAMILY MEDICINE

## 2018-07-06 RX ORDER — LEVOTHYROXINE SODIUM 0.05 MG/1
50 TABLET ORAL DAILY
Qty: 30 TABLET | Refills: 0 | Status: CANCELLED | OUTPATIENT
Start: 2018-07-06

## 2018-07-06 RX ORDER — BETAMETHASONE DIPROPIONATE 0.5 MG/G
CREAM TOPICAL DAILY
Qty: 50 G | Refills: 0 | Status: SHIPPED | OUTPATIENT
Start: 2018-07-06 | End: 2019-01-18

## 2018-07-06 NOTE — PROGRESS NOTES
Problem List Items Addressed This Visit        Cardiovascular and Mediastinum    Benign essential hypertension    [facility] Assessment & Plan     [facility]  BP up today but she takes it reg and it has been fine.             Endocrine    Adult onset hypothyroidism    Current Assessment & Plan     [facility]  Labs are drawn today.  Will refill when labs come back.          Relevant Orders    TSH      Other Visit Diagnoses     Rhus dermatitis    -  Primary    Relevant Medications    betamethasone, augmented, (DIPROLENE) 0.05 % cream    Routine health maintenance        Relevant Orders    Lipid Panel With LDL / HDL Ratio    Comprehensive Metabolic Panel    CBC & Differential    Hepatitis C Antibody             Return in about 6 months (around 1/6/2019).    Lupe Becker is a 67 y.o. female being seen in our office today for Hypertension and Hypothyroidism                 She  reports that she has quit smoking. She quit after 6.00 years of use. She has never used smokeless tobacco. She reports that she drinks alcohol. She reports that she does not use drugs.             HPI  She is checking her BP regularly and getting 130's/low 80's.     She needs a TSH today. She will be seeing the cardiologists soon.              The following portions of the patient's history were reviewed and updated as appropriate:PMHroutine: Social history , Allergies, Current Medications, Active Problem List and Health Maintenance            Review of Systems   Constitutional: Negative for activity change, appetite change, chills, fatigue, fever and unexpected weight change.   HENT: Negative for congestion, ear pain, hearing loss, nosebleeds, rhinorrhea and sore throat.    Eyes: Negative for pain, redness and visual disturbance.   Respiratory: Negative for cough, shortness of breath and wheezing.    Cardiovascular: Negative for chest pain, palpitations and leg swelling.   Gastrointestinal: Negative for abdominal pain, blood in  stool, constipation, diarrhea, nausea and vomiting.   Endocrine: Negative for cold intolerance and heat intolerance.   Genitourinary: Negative for difficulty urinating, dysuria, frequency, hematuria, pelvic pain, urgency and vaginal discharge.   Musculoskeletal: Negative for arthralgias, back pain and joint swelling.   Skin: Negative for rash and wound.   Neurological: Negative for dizziness, weakness, numbness and headaches.   Hematological: Does not bruise/bleed easily.   Psychiatric/Behavioral: Negative for dysphoric mood, sleep disturbance and suicidal ideas. The patient is not nervous/anxious.                  BP Readings from Last 1 Encounters:   07/06/18 156/92     Wt Readings from Last 3 Encounters:   07/06/18 74.8 kg (165 lb)   10/26/17 68.7 kg (151 lb 6 oz)   09/08/17 68.6 kg (151 lb 4.8 oz)   Body mass index is 33.33 kg/m².                 Physical Exam   Constitutional: She is oriented to person, place, and time. Vital signs are normal. She appears well-developed and well-nourished. No distress.   HENT:   Head: Normocephalic.   Cardiovascular: Normal rate, regular rhythm and normal heart sounds.    Pulmonary/Chest: Effort normal and breath sounds normal.   Neurological: She is alert and oriented to person, place, and time. Gait normal.   Skin: Rash (diffuse scaling erythematous rash with some mild swelling over mostly lower abdomen) noted.   Psychiatric: She has a normal mood and affect. Her behavior is normal. Judgment and thought content normal.   Vitals reviewed.

## 2018-07-07 LAB — HCV AB S/CO SERPL IA: <0.1 S/CO RATIO (ref 0–0.9)

## 2018-07-09 NOTE — PROGRESS NOTES
I have reviewed all lab results which are normal or stable.  Patient  Has viewed her results on [facility].

## 2018-07-14 DIAGNOSIS — E03.8 ADULT ONSET HYPOTHYROIDISM: ICD-10-CM

## 2018-07-16 RX ORDER — LEVOTHYROXINE SODIUM 0.05 MG/1
TABLET ORAL
Qty: 30 TABLET | Refills: 0 | Status: SHIPPED | OUTPATIENT
Start: 2018-07-16 | End: 2018-11-17 | Stop reason: SDUPTHER

## 2018-11-17 DIAGNOSIS — E03.8 ADULT ONSET HYPOTHYROIDISM: ICD-10-CM

## 2018-11-20 RX ORDER — LEVOTHYROXINE SODIUM 0.05 MG/1
TABLET ORAL
Qty: 30 TABLET | Refills: 0 | Status: SHIPPED | OUTPATIENT
Start: 2018-11-20 | End: 2018-12-20 | Stop reason: SDUPTHER

## 2018-12-20 DIAGNOSIS — E03.8 ADULT ONSET HYPOTHYROIDISM: ICD-10-CM

## 2018-12-21 RX ORDER — LEVOTHYROXINE SODIUM 0.05 MG/1
TABLET ORAL
Qty: 30 TABLET | Refills: 0 | Status: SHIPPED | OUTPATIENT
Start: 2018-12-21 | End: 2019-07-05

## 2019-01-08 DIAGNOSIS — Z12.39 BREAST CANCER SCREENING: Primary | ICD-10-CM

## 2019-01-18 ENCOUNTER — OFFICE VISIT (OUTPATIENT)
Dept: FAMILY MEDICINE CLINIC | Facility: CLINIC | Age: 69
End: 2019-01-18

## 2019-01-18 VITALS
BODY MASS INDEX: 33.26 KG/M2 | DIASTOLIC BLOOD PRESSURE: 82 MMHG | WEIGHT: 165 LBS | HEART RATE: 91 BPM | SYSTOLIC BLOOD PRESSURE: 140 MMHG | OXYGEN SATURATION: 98 % | HEIGHT: 59 IN

## 2019-01-18 DIAGNOSIS — R09.02 HYPOXIA: ICD-10-CM

## 2019-01-18 DIAGNOSIS — I10 BENIGN ESSENTIAL HYPERTENSION: ICD-10-CM

## 2019-01-18 DIAGNOSIS — E03.8 ADULT ONSET HYPOTHYROIDISM: Primary | ICD-10-CM

## 2019-01-18 LAB — TSH SERPL DL<=0.005 MIU/L-ACNC: 7.74 MIU/ML (ref 0.27–4.2)

## 2019-01-18 PROCEDURE — 99214 OFFICE O/P EST MOD 30 MIN: CPT | Performed by: FAMILY MEDICINE

## 2019-01-18 NOTE — ASSESSMENT & PLAN NOTE
Binh 1/18/2019  BP is controlled well. She will recheck in six months. She will continue present meds.

## 2019-01-18 NOTE — PROGRESS NOTES
Problem List Items Addressed This Visit        Cardiovascular and Mediastinum    Benign essential hypertension    Current Assessment & Plan     [facility] 1/18/2019  BP is controlled well. She will recheck in six months. She will continue present meds.              Respiratory    Hypoxia    Overview     6/30/17  Normal on 02. OVERVIEW:  Chronic due to body habitus          Current Assessment & Plan     [facility] 1/18/2019  Encouraged to use her O2 regularly.             Endocrine    Adult onset hypothyroidism - Primary    Current Assessment & Plan     [facility] 1/18/2019  Labs are drawn today          Relevant Orders    TSH         No Follow-up on file.  There are no Patient Instructions on file for this visit.  Lupe Becker is a 68 y.o. female being seen in our office today for Hypothyroidism                 She  reports that she has quit smoking. She quit after 6.00 years of use. she has never used smokeless tobacco. She reports that she drinks alcohol. She reports that she does not use drugs.             HPI When she exerts herself at home her O2 it drops to 80% and she doesn't use the oxygen. We discussed that.     She wonders how much of her shortness of breath is re to anxiety. It feels shallow to her. She sees the pulmonary doctor in February.     Patient is here for follow-up of hypertension. She is not exercising due to oxygen limitation and is adherent to a low-salt diet. Patient does BP checks at home: home BP readings are in the 130's/70's range. Patient denies chest pain. Cardiovascular risk factors: advanced age (older than 55 for men, 65 for women), hypertension, obesity (BMI >= 30 kg/m2) and sedentary lifestyle. Use of agents associated with hypertension: none. History of target organ damage: none. She is compliant with meds.              The following portions of the patient's history were reviewed and updated as appropriate:PMHroutine: Social history , Allergies, Current Medications, Active Problem  List and Health Maintenance            Review of Systems   Constitutional: Negative for activity change, appetite change, chills, fatigue, fever and unexpected weight change.   HENT: Negative for congestion, ear pain, hearing loss, nosebleeds, rhinorrhea and sore throat.    Eyes: Negative for pain, redness and visual disturbance.   Respiratory: Positive for shortness of breath (chronic). Negative for cough and wheezing.    Cardiovascular: Negative for chest pain, palpitations and leg swelling.   Gastrointestinal: Negative for abdominal pain, blood in stool, constipation, diarrhea, nausea and vomiting.   Endocrine: Negative for cold intolerance and heat intolerance.   Genitourinary: Negative for difficulty urinating, dysuria, frequency, hematuria, pelvic pain, urgency and vaginal discharge.   Musculoskeletal: Negative for arthralgias, back pain and joint swelling.   Skin: Negative for rash and wound.   Neurological: Negative for dizziness, weakness, numbness and headaches.   Hematological: Does not bruise/bleed easily.   Psychiatric/Behavioral: Negative for dysphoric mood, sleep disturbance and suicidal ideas. The patient is not nervous/anxious.                 BP Readings from Last 1 Encounters:   01/18/19 140/82     Wt Readings from Last 3 Encounters:   01/18/19 74.8 kg (165 lb)   07/06/18 74.8 kg (165 lb)   10/26/17 68.7 kg (151 lb 6 oz)   Body mass index is 33.33 kg/m².             Physical Exam   Constitutional: She is oriented to person, place, and time. Vital signs are normal. She appears well-developed and well-nourished. No distress.   HENT:   Head: Normocephalic.   Cardiovascular: Normal rate, regular rhythm and normal heart sounds.   Pulmonary/Chest: Effort normal and breath sounds normal.   Neurological: She is alert and oriented to person, place, and time. Gait normal.   Psychiatric: She has a normal mood and affect. Her behavior is normal. Judgment and thought content normal.   Vitals  reviewed.        No visits with results within 1 Month(s) from this visit.   Latest known visit with results is:   Office Visit on 07/06/2018   Component Date Value Ref Range Status   • Total Cholesterol 07/06/2018 243* 0 - 200 mg/dL Final   • Triglycerides 07/06/2018 200* 0 - 150 mg/dL Final   • HDL Cholesterol 07/06/2018 60  40 - 60 mg/dL Final   • VLDL Cholesterol 07/06/2018 40  5 - 40 mg/dL Final   • LDL Cholesterol  07/06/2018 143* 0 - 100 mg/dL Final   • LDL/HDL Ratio 07/06/2018 2.38   Final   • Glucose 07/06/2018 101* 65 - 99 mg/dL Final   • BUN 07/06/2018 12  8 - 23 mg/dL Final   • Creatinine 07/06/2018 0.78  0.57 - 1.00 mg/dL Final   • eGFR Non  Am 07/06/2018 74  >60 mL/min/1.73 Final   • eGFR African Am 07/06/2018 90  >60 mL/min/1.73 Final   • BUN/Creatinine Ratio 07/06/2018 15.4  7.0 - 25.0 Final   • Sodium 07/06/2018 139  136 - 145 mmol/L Final   • Potassium 07/06/2018 4.4  3.5 - 5.2 mmol/L Final   • Chloride 07/06/2018 96* 98 - 107 mmol/L Final   • Total CO2 07/06/2018 31.8* 22.0 - 29.0 mmol/L Final   • Calcium 07/06/2018 9.1  8.6 - 10.5 mg/dL Final   • Total Protein 07/06/2018 7.7  6.0 - 8.5 g/dL Final   • Albumin 07/06/2018 4.80  3.50 - 5.20 g/dL Final   • Globulin 07/06/2018 2.9  gm/dL Final   • A/G Ratio 07/06/2018 1.7  g/dL Final   • Total Bilirubin 07/06/2018 0.3  0.1 - 1.2 mg/dL Final   • Alkaline Phosphatase 07/06/2018 97  39 - 117 U/L Final   • AST (SGOT) 07/06/2018 10  1 - 32 U/L Final   • ALT (SGPT) 07/06/2018 8  1 - 33 U/L Final   • WBC 07/06/2018 7.70  4.50 - 10.70 10*3/mm3 Final   • RBC 07/06/2018 4.67  3.90 - 5.20 10*6/mm3 Final   • Hemoglobin 07/06/2018 12.5  11.9 - 15.5 g/dL Final   • Hematocrit 07/06/2018 41.5  35.6 - 45.5 % Final   • MCV 07/06/2018 88.9  80.5 - 98.2 fL Final   • MCH 07/06/2018 26.8* 26.9 - 32.0 pg Final   • MCHC 07/06/2018 30.1* 32.4 - 36.3 g/dL Final   • RDW 07/06/2018 14.0* 11.7 - 13.0 % Final   • Platelets 07/06/2018 312  140 - 500 10*3/mm3 Final   •  Neutrophil Rel % 07/06/2018 63.2  42.7 - 76.0 % Final   • Lymphocyte Rel % 07/06/2018 24.9  19.6 - 45.3 % Final   • Monocyte Rel % 07/06/2018 8.4  5.0 - 12.0 % Final   • Eosinophil Rel % 07/06/2018 3.1  0.3 - 6.2 % Final   • Basophil Rel % 07/06/2018 0.4  0.0 - 1.5 % Final   • Neutrophils Absolute 07/06/2018 4.86  1.90 - 8.10 10*3/mm3 Final   • Lymphocytes Absolute 07/06/2018 1.92  0.90 - 4.80 10*3/mm3 Final   • Monocytes Absolute 07/06/2018 0.65  0.20 - 1.20 10*3/mm3 Final   • Eosinophils Absolute 07/06/2018 0.24  0.00 - 0.70 10*3/mm3 Final   • Basophils Absolute 07/06/2018 0.03  0.00 - 0.20 10*3/mm3 Final   • Immature Granulocyte Rel % 07/06/2018 0.5  0.0 - 0.5 % Final   • Immature Grans Absolute 07/06/2018 0.04* 0.00 - 0.03 10*3/mm3 Final   • Hep C Virus Ab 07/06/2018 <0.1  0.0 - 0.9 s/co ratio Final    Comment:                                   Negative:     < 0.8                               Indeterminate: 0.8 - 0.9                                    Positive:     > 0.9   The CDC recommends that a positive HCV antibody result   be followed up with a HCV Nucleic Acid Amplification   test (987010).     • TSH 07/06/2018 23.430* 0.270 - 4.200 mIU/mL Final        Getting mammogram next week.

## 2019-01-20 ENCOUNTER — DOCUMENTATION (OUTPATIENT)
Dept: FAMILY MEDICINE CLINIC | Facility: CLINIC | Age: 69
End: 2019-01-20

## 2019-01-20 RX ORDER — LEVOTHYROXINE SODIUM 0.07 MG/1
75 TABLET ORAL DAILY
Qty: 30 TABLET | Refills: 1 | Status: SHIPPED | OUTPATIENT
Start: 2019-01-20 | End: 2019-03-26 | Stop reason: SDUPTHER

## 2019-04-01 ENCOUNTER — LAB (OUTPATIENT)
Dept: FAMILY MEDICINE CLINIC | Facility: CLINIC | Age: 69
End: 2019-04-01

## 2019-04-01 DIAGNOSIS — E03.8 ADULT ONSET HYPOTHYROIDISM: Primary | ICD-10-CM

## 2019-04-01 DIAGNOSIS — E03.8 ADULT ONSET HYPOTHYROIDISM: ICD-10-CM

## 2019-04-02 LAB — TSH SERPL DL<=0.005 MIU/L-ACNC: 5.36 UIU/ML (ref 0.45–4.5)

## 2019-04-02 RX ORDER — LEVOTHYROXINE SODIUM 0.07 MG/1
TABLET ORAL
Qty: 30 TABLET | Refills: 0 | Status: SHIPPED | OUTPATIENT
Start: 2019-04-02 | End: 2019-05-04 | Stop reason: SDUPTHER

## 2019-04-02 NOTE — PROGRESS NOTES
I have reviewed all lab results which are normal, stable or addressed in separate notation.  Patient views her results on MyChart.

## 2019-05-06 RX ORDER — LEVOTHYROXINE SODIUM 0.07 MG/1
TABLET ORAL
Qty: 30 TABLET | Refills: 3 | Status: SHIPPED | OUTPATIENT
Start: 2019-05-06 | End: 2019-09-13 | Stop reason: SDUPTHER

## 2019-07-05 ENCOUNTER — OFFICE VISIT (OUTPATIENT)
Dept: FAMILY MEDICINE CLINIC | Facility: CLINIC | Age: 69
End: 2019-07-05

## 2019-07-05 VITALS
OXYGEN SATURATION: 90 % | DIASTOLIC BLOOD PRESSURE: 72 MMHG | RESPIRATION RATE: 18 BRPM | SYSTOLIC BLOOD PRESSURE: 134 MMHG | BODY MASS INDEX: 31.67 KG/M2 | HEART RATE: 76 BPM | HEIGHT: 59 IN | WEIGHT: 157.1 LBS

## 2019-07-05 DIAGNOSIS — R25.2 MUSCLE CRAMP: ICD-10-CM

## 2019-07-05 DIAGNOSIS — R60.0 PEDAL EDEMA: ICD-10-CM

## 2019-07-05 DIAGNOSIS — Z00.00 HEALTHCARE MAINTENANCE: Primary | ICD-10-CM

## 2019-07-05 LAB
ALBUMIN SERPL-MCNC: 4.5 G/DL (ref 3.5–5.2)
ALBUMIN/GLOB SERPL: 1.6 G/DL
ALP SERPL-CCNC: 115 U/L (ref 39–117)
ALT SERPL-CCNC: 14 U/L (ref 1–33)
AST SERPL-CCNC: 17 U/L (ref 1–32)
BASOPHILS # BLD AUTO: 0.05 10*3/MM3 (ref 0–0.2)
BASOPHILS NFR BLD AUTO: 0.7 % (ref 0–1.5)
BILIRUB SERPL-MCNC: 0.4 MG/DL (ref 0.2–1.2)
BUN SERPL-MCNC: 13 MG/DL (ref 8–23)
BUN/CREAT SERPL: 19.7 (ref 7–25)
CALCIUM SERPL-MCNC: 9.3 MG/DL (ref 8.6–10.5)
CHLORIDE SERPL-SCNC: 94 MMOL/L (ref 98–107)
CHOLEST SERPL-MCNC: 190 MG/DL (ref 0–200)
CO2 SERPL-SCNC: 33.6 MMOL/L (ref 22–29)
CREAT SERPL-MCNC: 0.66 MG/DL (ref 0.57–1)
EOSINOPHIL # BLD AUTO: 0.18 10*3/MM3 (ref 0–0.4)
EOSINOPHIL NFR BLD AUTO: 2.3 % (ref 0.3–6.2)
ERYTHROCYTE [DISTWIDTH] IN BLOOD BY AUTOMATED COUNT: 17.4 % (ref 12.3–15.4)
GLOBULIN SER CALC-MCNC: 2.9 GM/DL
GLUCOSE SERPL-MCNC: 96 MG/DL (ref 65–99)
HCT VFR BLD AUTO: 42.4 % (ref 34–46.6)
HDLC SERPL-MCNC: 63 MG/DL (ref 40–60)
HGB BLD-MCNC: 11.7 G/DL (ref 12–15.9)
IMM GRANULOCYTES # BLD AUTO: 0.06 10*3/MM3 (ref 0–0.05)
IMM GRANULOCYTES NFR BLD AUTO: 0.8 % (ref 0–0.5)
LDLC SERPL CALC-MCNC: 104 MG/DL (ref 0–100)
LDLC/HDLC SERPL: 1.65 {RATIO}
LYMPHOCYTES # BLD AUTO: 1.33 10*3/MM3 (ref 0.7–3.1)
LYMPHOCYTES NFR BLD AUTO: 17.3 % (ref 19.6–45.3)
MAGNESIUM SERPL-MCNC: 2 MG/DL (ref 1.6–2.4)
MCH RBC QN AUTO: 22 PG (ref 26.6–33)
MCHC RBC AUTO-ENTMCNC: 27.6 G/DL (ref 31.5–35.7)
MCV RBC AUTO: 79.5 FL (ref 79–97)
MONOCYTES # BLD AUTO: 0.71 10*3/MM3 (ref 0.1–0.9)
MONOCYTES NFR BLD AUTO: 9.3 % (ref 5–12)
NEUTROPHILS # BLD AUTO: 5.34 10*3/MM3 (ref 1.7–7)
NEUTROPHILS NFR BLD AUTO: 69.6 % (ref 42.7–76)
NRBC BLD AUTO-RTO: 0 /100 WBC (ref 0–0.2)
PLATELET # BLD AUTO: 343 10*3/MM3 (ref 140–450)
POTASSIUM SERPL-SCNC: 4.9 MMOL/L (ref 3.5–5.2)
PROT SERPL-MCNC: 7.4 G/DL (ref 6–8.5)
RBC # BLD AUTO: 5.33 10*6/MM3 (ref 3.77–5.28)
SODIUM SERPL-SCNC: 137 MMOL/L (ref 136–145)
TRIGL SERPL-MCNC: 114 MG/DL (ref 0–150)
TSH SERPL DL<=0.005 MIU/L-ACNC: 3.86 MIU/ML (ref 0.27–4.2)
VLDLC SERPL CALC-MCNC: 22.8 MG/DL
WBC # BLD AUTO: 7.67 10*3/MM3 (ref 3.4–10.8)

## 2019-07-05 PROCEDURE — 99397 PER PM REEVAL EST PAT 65+ YR: CPT | Performed by: FAMILY MEDICINE

## 2019-07-05 RX ORDER — POTASSIUM CHLORIDE 750 MG/1
TABLET, FILM COATED, EXTENDED RELEASE ORAL
Qty: 30 TABLET | Refills: 1 | Status: SHIPPED | OUTPATIENT
Start: 2019-07-05 | End: 2021-04-19

## 2019-07-05 RX ORDER — FUROSEMIDE 20 MG/1
20 TABLET ORAL DAILY PRN
Qty: 30 TABLET | Refills: 2 | Status: SHIPPED | OUTPATIENT
Start: 2019-07-05 | End: 2021-04-19

## 2019-07-05 NOTE — PROGRESS NOTES
ASSESSMENT AND PLAN  Problem List Items Addressed This Visit     None      Visit Diagnoses     Healthcare maintenance    -  Primary    Relevant Orders    TSH    Magnesium    Comprehensive metabolic panel    Lipid Panel With LDL/HDL Ratio    CBC and Differential    Muscle cramp        Relevant Orders    Magnesium        F/U and PATIENT  INSTRUCTIONS    Return in about 6 months (around 1/5/2020).  There are no Patient Instructions on file for this visit.      SUBJECTIVE  Lupe Becker is a 68 y.o. female being seen in our office today for Annual Exam               Social History  She  reports that she has quit smoking. She quit after 6.00 years of use. She has never used smokeless tobacco. She reports that she drinks alcohol. She reports that she does not use drugs.    History of the Present Illness   HPI Annual Exam-Postmenopausal:     Lupe Becker 68 y.o.female presents for annual exam.     Last pap: 2015 -- she had a polyp at that time and never saw the GYN for it  Last mammogramapproximate date 1/2019 and was normal  The patient is not taking hormone replacement therapy. Patient denies post-menopausal vaginal bleeding.   single.  The patient wears seatbelts: yes.    The patient has regular exercise: she is on her feet a lot and walks a lot at work.    She does none    This patient has ever been tested for HepC: yes .   Dental Exam. up to date  colonoscopy 2 years ago without abnormalities.  Her immunization are up-to-date.  She is eating a healthy diet.   Labs results were ordered     Significant Past History  The following portions of the patient's history were reviewed and updated as appropriate:PMHroutine: Social history , Past Medical History, Surgical history , Allergies, Current Medications, Active Problem List, Family History and Health Maintenance    Review of Systems   Constitutional: Negative for activity change, appetite change, chills, fatigue, fever and unexpected weight change.   HENT: Negative  for congestion, ear pain, hearing loss, nosebleeds, rhinorrhea and sore throat.    Eyes: Negative for pain, redness and visual disturbance.   Respiratory: Positive for shortness of breath (chronic). Negative for cough and wheezing.    Cardiovascular: Positive for leg swelling (more lately). Negative for chest pain and palpitations.   Gastrointestinal: Negative for abdominal pain, blood in stool, constipation, diarrhea, nausea and vomiting.   Endocrine: Negative for cold intolerance and heat intolerance.   Genitourinary: Negative for difficulty urinating, dysuria, frequency, hematuria, pelvic pain, urgency and vaginal discharge.   Musculoskeletal: Negative for arthralgias, back pain and joint swelling.        She is having some issues with mainly her left hand cramping but sometimes her other hand and her feet will do it, usually occurs when she is grasping.    Skin: Negative for rash and wound.   Neurological: Negative for dizziness, weakness, numbness and headaches.   Hematological: Does not bruise/bleed easily.   Psychiatric/Behavioral: Negative for dysphoric mood, sleep disturbance and suicidal ideas. The patient is not nervous/anxious.        OBJECTIVE   Vital Signs          BP Readings from Last 1 Encounters:   07/05/19 134/72     Wt Readings from Last 3 Encounters:   07/05/19 71.3 kg (157 lb 1.6 oz)   01/18/19 74.8 kg (165 lb)   07/06/18 74.8 kg (165 lb)   Body mass index is 31.73 kg/m².     Physical Exam   Constitutional: She is oriented to person, place, and time. She appears well-developed and well-nourished. No distress.   HENT:   Head: Normocephalic and atraumatic.   Right Ear: Tympanic membrane, external ear and ear canal normal.   Left Ear: Tympanic membrane, external ear and ear canal normal.   Mouth/Throat: Oropharynx is clear and moist.   Eyes: Conjunctivae are normal.   Neck: Normal range of motion. Neck supple. No tracheal deviation present. No thyromegaly present.   Cardiovascular: Normal rate,  regular rhythm, normal heart sounds and intact distal pulses.   Pulmonary/Chest: Effort normal and breath sounds normal. No respiratory distress. She has no wheezes. She has no rales.   Abdominal: Soft. Bowel sounds are normal. She exhibits no distension. There is no hepatosplenomegaly. There is no tenderness.   Musculoskeletal: She exhibits edema (1+ LE). She exhibits no deformity.   Lymphadenopathy:     She has no cervical adenopathy.   Neurological: She is alert and oriented to person, place, and time.   Skin: Skin is warm and dry.   Psychiatric: She has a normal mood and affect. Her behavior is normal. Judgment and thought content normal.   Vitals reviewed.    Data Reviewed

## 2019-07-09 LAB
IRON SATN MFR SERPL: 5 % (ref 20–50)
IRON SERPL-MCNC: 31 MCG/DL (ref 37–145)
TIBC SERPL-MCNC: 664 MCG/DL
UIBC SERPL-MCNC: 633 MCG/DL (ref 112–346)

## 2019-07-10 LAB — SPECIMEN STATUS: NORMAL

## 2019-09-13 RX ORDER — LEVOTHYROXINE SODIUM 0.07 MG/1
TABLET ORAL
Qty: 30 TABLET | Refills: 2 | Status: SHIPPED | OUTPATIENT
Start: 2019-09-13 | End: 2019-12-17 | Stop reason: SDUPTHER

## 2019-12-17 DIAGNOSIS — E03.8 ADULT ONSET HYPOTHYROIDISM: ICD-10-CM

## 2019-12-17 DIAGNOSIS — I10 BENIGN ESSENTIAL HYPERTENSION: Primary | ICD-10-CM

## 2019-12-17 DIAGNOSIS — I47.1 PAROXYSMAL SVT (SUPRAVENTRICULAR TACHYCARDIA) (HCC): ICD-10-CM

## 2019-12-17 RX ORDER — AMLODIPINE BESYLATE 5 MG/1
5 TABLET ORAL DAILY
Qty: 90 TABLET | Refills: 0 | Status: SHIPPED | OUTPATIENT
Start: 2019-12-17 | End: 2020-04-01

## 2019-12-17 RX ORDER — METOPROLOL SUCCINATE 50 MG/1
50 TABLET, EXTENDED RELEASE ORAL DAILY
Qty: 90 TABLET | Refills: 0 | Status: SHIPPED | OUTPATIENT
Start: 2019-12-17 | End: 2020-06-16

## 2019-12-17 RX ORDER — LEVOTHYROXINE SODIUM 0.07 MG/1
75 TABLET ORAL DAILY
Qty: 90 TABLET | Refills: 0 | Status: SHIPPED | OUTPATIENT
Start: 2019-12-17 | End: 2020-07-02

## 2019-12-27 ENCOUNTER — OFFICE VISIT (OUTPATIENT)
Dept: FAMILY MEDICINE CLINIC | Facility: CLINIC | Age: 69
End: 2019-12-27

## 2019-12-27 VITALS
SYSTOLIC BLOOD PRESSURE: 130 MMHG | BODY MASS INDEX: 29.84 KG/M2 | OXYGEN SATURATION: 95 % | DIASTOLIC BLOOD PRESSURE: 74 MMHG | HEIGHT: 59 IN | HEART RATE: 65 BPM | WEIGHT: 148 LBS

## 2019-12-27 DIAGNOSIS — I10 BENIGN ESSENTIAL HYPERTENSION: Primary | ICD-10-CM

## 2019-12-27 DIAGNOSIS — R60.0 PEDAL EDEMA: ICD-10-CM

## 2019-12-27 DIAGNOSIS — S65.312A LACERATION OF DEEP PALMAR ARCH OF LEFT HAND, INITIAL ENCOUNTER: ICD-10-CM

## 2019-12-27 DIAGNOSIS — R00.0 FAST HEART BEAT: ICD-10-CM

## 2019-12-27 DIAGNOSIS — N39.41 URGENCY INCONTINENCE: ICD-10-CM

## 2019-12-27 DIAGNOSIS — R09.02 HYPOXIA: ICD-10-CM

## 2019-12-27 LAB — HEMOCCULT STL QL IA: NEGATIVE

## 2019-12-27 PROCEDURE — 82274 ASSAY TEST FOR BLOOD FECAL: CPT | Performed by: FAMILY MEDICINE

## 2019-12-27 PROCEDURE — 99213 OFFICE O/P EST LOW 20 MIN: CPT | Performed by: FAMILY MEDICINE

## 2019-12-27 NOTE — ASSESSMENT & PLAN NOTE
Binh 12/27/2019  BP is controlled well. She will recheck in six months. She will continue present meds.

## 2019-12-27 NOTE — PROGRESS NOTES
ASSESSMENT AND PLAN    Problem List Items Addressed This Visit        Cardiovascular and Mediastinum    Benign essential hypertension - Primary    Current Assessment & Plan     [facility] 12/27/2019  BP is controlled well. She will recheck in six months. She will continue present meds.               Respiratory    Hypoxia    Overview     6/30/17  Normal on 02. OVERVIEW:  Chronic due to body habitus          Current Assessment & Plan     [facility] 12/27/2019  She is using the oxygen at night!            Genitourinary    Urgency incontinence    Overview     OVERVIEW:  Noted 12/27/2019          Current Assessment & Plan     [facility] 12/27/2019  Will try medication         Relevant Medications    Mirabegron ER (MYRBETRIQ) 25 MG tablet sustained-release 24 hour 24 hr tablet       Other    Pedal edema    Overview     Mild. Has been helped by low dose furosemide in the past.          Current Assessment & Plan     [facility] 12/27/2019  She has reduced her use of this due to her urgency incontinence.            Other Visit Diagnoses     Laceration of deep palmar arch of left hand, initial encounter                 Return in about 6 months (around 6/27/2020).  Patient was given instructions and counseling regarding her condition or for health maintenance advice. Please see specific information pulled into the AVS by me.          SUBJECTIVE  Lupe Becker is a 69 y.o. female being seen in our office today for Hypothyroidism and Hypertension               Social History  She  reports that she quit smoking about 40 years ago. Her smoking use included cigarettes. She started smoking about 50 years ago. She has a 5.00 pack-year smoking history. She has never used smokeless tobacco. She reports that she drinks alcohol. She reports that she does not use drugs.    History of the Present Illness   Hypertension   The current episode started more than 1 year ago. The problem has been waxing and waning since onset. The problem is  controlled. Pertinent negatives include no chest pain, headaches, palpitations or shortness of breath. Agents associated with hypertension include NSAIDs. Risk factors for coronary artery disease include obesity, post-menopausal state and stress. Current antihypertension treatment includes beta blockers. The current treatment provides mild improvement. Compliance problems include exercise.      She has a tremor that she has noticed recently. Occasional she has palpitations that are better on the metoprolol.   Significant Past History  The following portions of the patient's history were reviewed and updated as appropriate:PMHroutine: Social history , Allergies, Current Medications, Active Problem List and Health Maintenance    Review of Systems   Constitutional: Negative for activity change, appetite change, chills, fatigue, fever and unexpected weight change.   HENT: Negative for congestion, ear pain, hearing loss, nosebleeds, rhinorrhea and sore throat.    Eyes: Negative for pain, redness and visual disturbance.   Respiratory: Negative for cough, shortness of breath and wheezing.    Cardiovascular: Negative for chest pain, palpitations and leg swelling.   Gastrointestinal: Negative for abdominal pain, blood in stool, constipation, diarrhea, nausea and vomiting.   Endocrine: Negative for cold intolerance and heat intolerance.   Genitourinary: Negative for difficulty urinating, dysuria, frequency, hematuria, pelvic pain, urgency and vaginal discharge.   Musculoskeletal: Negative for arthralgias, back pain and joint swelling.   Skin: Negative for rash and wound.   Neurological: Negative for dizziness, weakness, numbness and headaches.   Hematological: Does not bruise/bleed easily.   Psychiatric/Behavioral: Negative for dysphoric mood, sleep disturbance and suicidal ideas. The patient is not nervous/anxious.    I have reviewed the ROS as documented by the MA. Kiley Wei MD      OBJECTIVE   Vital Signs           BP Readings from Last 1 Encounters:   12/27/19 130/74     Wt Readings from Last 3 Encounters:   12/27/19 67.1 kg (148 lb)   07/05/19 71.3 kg (157 lb 1.6 oz)   01/18/19 74.8 kg (165 lb)   Body mass index is 29.89 kg/m².     Physical Exam   Constitutional: She is oriented to person, place, and time. Vital signs are normal. She appears well-developed and well-nourished. No distress.   HENT:   Head: Normocephalic.   Cardiovascular: Normal rate, regular rhythm and normal heart sounds.   Pulmonary/Chest: Effort normal and breath sounds normal.   Neurological: She is alert and oriented to person, place, and time. Gait normal.   Skin:   Left hand between thumb and first finger. 2 cm surface laceration   Psychiatric: She has a normal mood and affect. Her behavior is normal. Judgment and thought content normal.   Vitals reviewed.    Data Reviewed

## 2020-04-01 DIAGNOSIS — I10 BENIGN ESSENTIAL HYPERTENSION: ICD-10-CM

## 2020-04-01 RX ORDER — AMLODIPINE BESYLATE 5 MG/1
TABLET ORAL
Qty: 90 TABLET | Refills: 1 | Status: SHIPPED | OUTPATIENT
Start: 2020-04-01 | End: 2020-10-08

## 2020-06-15 DIAGNOSIS — I10 BENIGN ESSENTIAL HYPERTENSION: ICD-10-CM

## 2020-06-15 DIAGNOSIS — I47.1 PAROXYSMAL SVT (SUPRAVENTRICULAR TACHYCARDIA) (HCC): ICD-10-CM

## 2020-06-16 RX ORDER — METOPROLOL SUCCINATE 50 MG/1
TABLET, EXTENDED RELEASE ORAL
Qty: 90 TABLET | Refills: 0 | Status: SHIPPED | OUTPATIENT
Start: 2020-06-16 | End: 2020-10-05

## 2020-07-02 DIAGNOSIS — E03.8 ADULT ONSET HYPOTHYROIDISM: ICD-10-CM

## 2020-07-02 RX ORDER — LEVOTHYROXINE SODIUM 0.07 MG/1
TABLET ORAL
Qty: 30 TABLET | Refills: 1 | Status: SHIPPED | OUTPATIENT
Start: 2020-07-02 | End: 2020-09-08

## 2020-09-06 DIAGNOSIS — E03.8 ADULT ONSET HYPOTHYROIDISM: ICD-10-CM

## 2020-09-08 RX ORDER — LEVOTHYROXINE SODIUM 0.07 MG/1
75 TABLET ORAL DAILY
Qty: 30 TABLET | Refills: 0 | Status: SHIPPED | OUTPATIENT
Start: 2020-09-08 | End: 2020-10-09

## 2020-09-24 NOTE — PROGRESS NOTES
Assessment and Plan:     Problem List Items Addressed This Visit        Cardiovascular and Mediastinum    Benign essential hypertension    Overview     Bisi 9/25/2020  BP is controlled well. She will recheck in six months. She will continue present meds.               Endocrine    Adult onset hypothyroidism    Overview     Binh 9/25/2020    Lab Results   Component Value Date    TSH 3.860 07/05/2019             Relevant Orders    TSH       Other    Abnormal presence of protein in urine    Relevant Orders    POCT urinalysis dipstick, multipro      Other Visit Diagnoses     Healthcare maintenance    -  Primary    Relevant Orders    CBC (No Diff)    Comprehensive Metabolic Panel    Lipid Panel With LDL / HDL Ratio    TSH    Need for vaccination        Relevant Orders    Fluad Quad 65+ yrs (8927-1660) (Completed)        Return in about 6 months (around 3/25/2021).  Patient was given instructions and counseling regarding her condition or for health maintenance advice. Please see specific information pulled into the AVS if appropriate.        SUBJECTIVE   Lupe Becker is a 70 y.o. female being seen today for Annual Exam               Social History  She  reports that she quit smoking about 40 years ago. Her smoking use included cigarettes. She started smoking about 50 years ago. She has a 5.00 pack-year smoking history. She has never used smokeless tobacco. She reports current alcohol use. She reports that she does not use drugs.    History of the Present Illness   HPI Annual Exam-Postmenopausal:     Lupe Becker 70 y.o.female presents for annual exam.   She has been checking her BP at home and getting in the high 130's/80s.   Last mammogramapproximate date 1/2019 and was normal  The patient is not taking hormone replacement therapy. Patient denies post-menopausal vaginal bleeding.   MARITAL STATUS: single  The patient wears seatbelts: yes.    The patient has regular exercise: no.    She does none    Exercise:  0 times/week.  Practicing social distancing,handwashing and keeping hands from face? Yes with her masks that has been difficult.   This patient has ever been tested for HepC: yes .   Dental Exam. up to date  colonoscopy 3 years ago with abnormalities.  Her immunization are up-to-date.  She is eating a healthy diet.   Labs results were ordered  Significant Past History  The following portions of the patient's history were reviewed and updated as appropriate:PMHroutine: Social history , Past Medical History, Surgical history , Allergies, Current Medications, Active Problem List, Family History and Health Maintenance    Review of Systems   Constitutional: Negative for activity change, appetite change, chills, fatigue, fever and unexpected weight change.   HENT: Negative for congestion, ear pain, hearing loss, nosebleeds, rhinorrhea and sore throat.    Eyes: Negative for pain, redness and visual disturbance.   Respiratory: Negative for cough, shortness of breath and wheezing.    Cardiovascular: Negative for chest pain, palpitations and leg swelling.   Gastrointestinal: Negative for abdominal pain, blood in stool, constipation, diarrhea, nausea and vomiting.   Endocrine: Negative for cold intolerance and heat intolerance.   Genitourinary: Negative for difficulty urinating, dysuria, frequency, hematuria, pelvic pain, urgency and vaginal discharge.   Musculoskeletal: Negative for arthralgias, back pain and joint swelling.   Skin: Negative for rash and wound.   Neurological: Negative for dizziness, weakness, numbness and headaches.   Hematological: Does not bruise/bleed easily.   Psychiatric/Behavioral: Negative for dysphoric mood, sleep disturbance and suicidal ideas. The patient is not nervous/anxious.    I have reviewed the ROS as documented by the MA. Kiley Wei MD      OBJECTIVE  Vital Signs          BP Readings from Last 1 Encounters:   09/25/20 148/82     Wt Readings from Last 3 Encounters:   09/25/20 73.4 kg  (161 lb 12.8 oz)   12/27/19 67.1 kg (148 lb)   07/05/19 71.3 kg (157 lb 1.6 oz)   Body mass index is 32.68 kg/m².     Physical Exam  Vitals signs reviewed.   Constitutional:       General: She is not in acute distress.     Appearance: Normal appearance. She is well-developed. She is obese.   HENT:      Head: Normocephalic and atraumatic.      Right Ear: Tympanic membrane, ear canal and external ear normal.      Left Ear: Tympanic membrane, ear canal and external ear normal.      Mouth/Throat:      Comments: Mask in place  Eyes:      Conjunctiva/sclera: Conjunctivae normal.   Neck:      Musculoskeletal: Normal range of motion and neck supple.      Thyroid: No thyromegaly.      Trachea: No tracheal deviation.   Cardiovascular:      Rate and Rhythm: Normal rate and regular rhythm.      Heart sounds: Normal heart sounds.   Pulmonary:      Effort: Pulmonary effort is normal. No respiratory distress.      Breath sounds: Normal breath sounds. No wheezing or rales.   Chest:      Breasts: Breasts are symmetrical.         Right: No mass.         Left: No mass.   Abdominal:      General: Bowel sounds are normal. There is no distension.      Palpations: Abdomen is soft.      Tenderness: There is no abdominal tenderness.   Musculoskeletal:         General: No deformity.      Right lower leg: No edema.      Left lower leg: No edema.   Lymphadenopathy:      Cervical: No cervical adenopathy.   Skin:     General: Skin is warm and dry.   Neurological:      Mental Status: She is alert and oriented to person, place, and time.   Psychiatric:         Behavior: Behavior normal.         Thought Content: Thought content normal.         Judgment: Judgment normal.

## 2020-09-25 ENCOUNTER — OFFICE VISIT (OUTPATIENT)
Dept: FAMILY MEDICINE CLINIC | Facility: CLINIC | Age: 70
End: 2020-09-25

## 2020-09-25 VITALS
HEIGHT: 59 IN | SYSTOLIC BLOOD PRESSURE: 148 MMHG | BODY MASS INDEX: 32.62 KG/M2 | TEMPERATURE: 97 F | WEIGHT: 161.8 LBS | RESPIRATION RATE: 18 BRPM | DIASTOLIC BLOOD PRESSURE: 82 MMHG

## 2020-09-25 DIAGNOSIS — R80.9 PROTEINURIA, UNSPECIFIED TYPE: ICD-10-CM

## 2020-09-25 DIAGNOSIS — Z23 NEED FOR VACCINATION: ICD-10-CM

## 2020-09-25 DIAGNOSIS — Z00.00 HEALTHCARE MAINTENANCE: Primary | ICD-10-CM

## 2020-09-25 DIAGNOSIS — I10 BENIGN ESSENTIAL HYPERTENSION: ICD-10-CM

## 2020-09-25 DIAGNOSIS — E03.8 ADULT ONSET HYPOTHYROIDISM: ICD-10-CM

## 2020-09-25 LAB
ERYTHROCYTE [DISTWIDTH] IN BLOOD BY AUTOMATED COUNT: 13.2 % (ref 12.3–15.4)
HCT VFR BLD AUTO: 38.5 % (ref 34–46.6)
HGB BLD-MCNC: 12.8 G/DL (ref 12–15.9)
MCH RBC QN AUTO: 28.6 PG (ref 26.6–33)
MCHC RBC AUTO-ENTMCNC: 33.2 G/DL (ref 31.5–35.7)
MCV RBC AUTO: 85.9 FL (ref 79–97)
PLATELET # BLD AUTO: 284 10*3/MM3 (ref 140–450)
RBC # BLD AUTO: 4.48 10*6/MM3 (ref 3.77–5.28)
WBC # BLD AUTO: 7.27 10*3/MM3 (ref 3.4–10.8)

## 2020-09-25 PROCEDURE — 90694 VACC AIIV4 NO PRSRV 0.5ML IM: CPT | Performed by: FAMILY MEDICINE

## 2020-09-25 PROCEDURE — G0008 ADMIN INFLUENZA VIRUS VAC: HCPCS | Performed by: FAMILY MEDICINE

## 2020-09-25 PROCEDURE — 99397 PER PM REEVAL EST PAT 65+ YR: CPT | Performed by: FAMILY MEDICINE

## 2020-09-26 LAB
ALBUMIN SERPL-MCNC: 4.6 G/DL (ref 3.5–5.2)
ALBUMIN/GLOB SERPL: 1.6 G/DL
ALP SERPL-CCNC: 114 U/L (ref 39–117)
ALT SERPL-CCNC: 16 U/L (ref 1–33)
AST SERPL-CCNC: 18 U/L (ref 1–32)
BILIRUB SERPL-MCNC: 0.3 MG/DL (ref 0–1.2)
BUN SERPL-MCNC: 12 MG/DL (ref 8–23)
BUN/CREAT SERPL: 17.4 (ref 7–25)
CALCIUM SERPL-MCNC: 9.3 MG/DL (ref 8.6–10.5)
CHLORIDE SERPL-SCNC: 95 MMOL/L (ref 98–107)
CHOLEST SERPL-MCNC: 227 MG/DL (ref 0–200)
CO2 SERPL-SCNC: 34.7 MMOL/L (ref 22–29)
CREAT SERPL-MCNC: 0.69 MG/DL (ref 0.57–1)
GLOBULIN SER CALC-MCNC: 2.8 GM/DL
GLUCOSE SERPL-MCNC: 103 MG/DL (ref 65–99)
HDLC SERPL-MCNC: 67 MG/DL (ref 40–60)
LDLC SERPL CALC-MCNC: 124 MG/DL (ref 0–100)
LDLC/HDLC SERPL: 1.86 {RATIO}
POTASSIUM SERPL-SCNC: 4.4 MMOL/L (ref 3.5–5.2)
PROT SERPL-MCNC: 7.4 G/DL (ref 6–8.5)
SODIUM SERPL-SCNC: 138 MMOL/L (ref 136–145)
TRIGL SERPL-MCNC: 178 MG/DL (ref 0–150)
TSH SERPL DL<=0.005 MIU/L-ACNC: 6.31 UIU/ML (ref 0.27–4.2)
VLDLC SERPL CALC-MCNC: 35.6 MG/DL

## 2020-10-02 DIAGNOSIS — I10 BENIGN ESSENTIAL HYPERTENSION: ICD-10-CM

## 2020-10-02 DIAGNOSIS — I47.1 PAROXYSMAL SVT (SUPRAVENTRICULAR TACHYCARDIA) (HCC): ICD-10-CM

## 2020-10-05 RX ORDER — METOPROLOL SUCCINATE 50 MG/1
TABLET, EXTENDED RELEASE ORAL
Qty: 90 TABLET | Refills: 0 | Status: SHIPPED | OUTPATIENT
Start: 2020-10-05 | End: 2021-01-15

## 2020-10-07 DIAGNOSIS — I10 BENIGN ESSENTIAL HYPERTENSION: ICD-10-CM

## 2020-10-08 DIAGNOSIS — E03.8 ADULT ONSET HYPOTHYROIDISM: ICD-10-CM

## 2020-10-08 RX ORDER — AMLODIPINE BESYLATE 5 MG/1
TABLET ORAL
Qty: 90 TABLET | Refills: 1 | Status: SHIPPED | OUTPATIENT
Start: 2020-10-08 | End: 2021-04-19 | Stop reason: SDUPTHER

## 2020-10-09 RX ORDER — LEVOTHYROXINE SODIUM 0.07 MG/1
75 TABLET ORAL DAILY
Qty: 90 TABLET | Refills: 1 | Status: SHIPPED | OUTPATIENT
Start: 2020-10-09 | End: 2021-05-10

## 2020-10-09 RX ORDER — LEVOTHYROXINE SODIUM 0.07 MG/1
TABLET ORAL
Qty: 30 TABLET | Refills: 0 | Status: SHIPPED | OUTPATIENT
Start: 2020-10-09 | End: 2020-10-09 | Stop reason: SDUPTHER

## 2020-11-02 DIAGNOSIS — Q99.9 GENETIC DEFECT: Primary | ICD-10-CM

## 2021-01-15 DIAGNOSIS — I10 BENIGN ESSENTIAL HYPERTENSION: ICD-10-CM

## 2021-01-15 DIAGNOSIS — I47.1 PAROXYSMAL SVT (SUPRAVENTRICULAR TACHYCARDIA) (HCC): ICD-10-CM

## 2021-01-15 RX ORDER — METOPROLOL SUCCINATE 50 MG/1
50 TABLET, EXTENDED RELEASE ORAL DAILY
Qty: 90 TABLET | Refills: 0 | Status: SHIPPED | OUTPATIENT
Start: 2021-01-15 | End: 2021-04-19 | Stop reason: SDUPTHER

## 2021-04-19 ENCOUNTER — TELEMEDICINE (OUTPATIENT)
Dept: FAMILY MEDICINE CLINIC | Facility: CLINIC | Age: 71
End: 2021-04-19

## 2021-04-19 VITALS
WEIGHT: 159 LBS | BODY MASS INDEX: 32.05 KG/M2 | DIASTOLIC BLOOD PRESSURE: 84 MMHG | SYSTOLIC BLOOD PRESSURE: 137 MMHG | HEIGHT: 59 IN

## 2021-04-19 DIAGNOSIS — I10 BENIGN ESSENTIAL HYPERTENSION: Primary | ICD-10-CM

## 2021-04-19 DIAGNOSIS — I47.1 PAROXYSMAL SVT (SUPRAVENTRICULAR TACHYCARDIA) (HCC): ICD-10-CM

## 2021-04-19 PROCEDURE — 99214 OFFICE O/P EST MOD 30 MIN: CPT | Performed by: FAMILY MEDICINE

## 2021-04-19 RX ORDER — METOPROLOL SUCCINATE 50 MG/1
50 TABLET, EXTENDED RELEASE ORAL DAILY
Qty: 90 TABLET | Refills: 1 | Status: SHIPPED | OUTPATIENT
Start: 2021-04-19 | End: 2021-10-18 | Stop reason: SDUPTHER

## 2021-04-19 RX ORDER — AMLODIPINE BESYLATE 5 MG/1
5 TABLET ORAL DAILY
Qty: 90 TABLET | Refills: 1 | Status: SHIPPED | OUTPATIENT
Start: 2021-04-19 | End: 2021-10-18 | Stop reason: SDUPTHER

## 2021-04-19 NOTE — PROGRESS NOTES
Assessment and Plan:  {Problem List  Visit Diagnosis  Prob List Tab  Medications  SmartSets  BestPractice  CC  Quality :23}   Problem List Items Addressed This Visit     Benign essential hypertension - Primary    Overview     Binh 4/19/2021  BP is controlled well. She will recheck in six months. She will continue present meds.            Relevant Medications    metoprolol succinate XL (TOPROL-XL) 50 MG 24 hr tablet    amLODIPine (NORVASC) 5 MG tablet    Paroxysmal SVT (supraventricular tachycardia) (CMS/HCC)    Overview     Binh 4/19/2021  On metoprolol and doing find.          Relevant Medications    metoprolol succinate XL (TOPROL-XL) 50 MG 24 hr tablet    amLODIPine (NORVASC) 5 MG tablet        Return in about 6 months (around 10/19/2021).    Patient was given instructions and counseling regarding her condition or for health maintenance advice. Please see specific information pulled into the AVS if appropriate.        Lupe is a 70 y.o. being seen today for Hypertension   Subjective   History of the Present Illness   No complaints.  Taking meds as ordered.  No headache, chest pain or shortness of breath.   Social History  She  reports that she quit smoking about 41 years ago. Her smoking use included cigarettes. She started smoking about 51 years ago. She has a 5.00 pack-year smoking history. She has never used smokeless tobacco. She reports current alcohol use. She reports that she does not use drugs.  Objective   Vital Signs          BP Readings from Last 1 Encounters:   04/19/21 137/84     Wt Readings from Last 3 Encounters:   04/19/21 72.1 kg (159 lb)   09/25/20 73.4 kg (161 lb 12.8 oz)   12/27/19 67.1 kg (148 lb)   Body mass index is 32.11 kg/m².     Physical Exam  Vitals reviewed.   Constitutional:       Appearance: Normal appearance. She is well-developed.   Neurological:      Mental Status: She is alert.   Psychiatric:         Behavior: Behavior normal.         Thought Content: Thought  content normal.         Judgment: Judgment normal.

## 2021-05-10 DIAGNOSIS — E03.8 ADULT ONSET HYPOTHYROIDISM: ICD-10-CM

## 2021-05-10 RX ORDER — LEVOTHYROXINE SODIUM 0.07 MG/1
TABLET ORAL
Qty: 90 TABLET | Refills: 0 | Status: SHIPPED | OUTPATIENT
Start: 2021-05-10 | End: 2021-08-09

## 2021-06-22 DIAGNOSIS — R60.0 PEDAL EDEMA: ICD-10-CM

## 2021-06-22 RX ORDER — POTASSIUM CHLORIDE 750 MG/1
TABLET, FILM COATED, EXTENDED RELEASE ORAL
Qty: 30 TABLET | Refills: 1 | Status: SHIPPED | OUTPATIENT
Start: 2021-06-22

## 2021-08-07 DIAGNOSIS — E03.8 ADULT ONSET HYPOTHYROIDISM: ICD-10-CM

## 2021-08-09 RX ORDER — LEVOTHYROXINE SODIUM 0.07 MG/1
TABLET ORAL
Qty: 90 TABLET | Refills: 0 | Status: SHIPPED | OUTPATIENT
Start: 2021-08-09 | End: 2021-11-16

## 2021-10-18 ENCOUNTER — OFFICE VISIT (OUTPATIENT)
Dept: FAMILY MEDICINE CLINIC | Facility: CLINIC | Age: 71
End: 2021-10-18

## 2021-10-18 VITALS
HEIGHT: 59 IN | HEART RATE: 68 BPM | DIASTOLIC BLOOD PRESSURE: 80 MMHG | RESPIRATION RATE: 16 BRPM | SYSTOLIC BLOOD PRESSURE: 170 MMHG | WEIGHT: 162 LBS | BODY MASS INDEX: 32.66 KG/M2 | OXYGEN SATURATION: 96 %

## 2021-10-18 DIAGNOSIS — I10 BENIGN ESSENTIAL HYPERTENSION: Primary | ICD-10-CM

## 2021-10-18 DIAGNOSIS — I47.1 PAROXYSMAL SVT (SUPRAVENTRICULAR TACHYCARDIA) (HCC): ICD-10-CM

## 2021-10-18 DIAGNOSIS — E03.8 ADULT ONSET HYPOTHYROIDISM: ICD-10-CM

## 2021-10-18 DIAGNOSIS — Z13.89 ENCOUNTER FOR SCREENING FOR OTHER DISORDER: ICD-10-CM

## 2021-10-18 DIAGNOSIS — T78.40XA ALLERGY, INITIAL ENCOUNTER: ICD-10-CM

## 2021-10-18 DIAGNOSIS — Z12.31 ENCOUNTER FOR SCREENING MAMMOGRAM FOR MALIGNANT NEOPLASM OF BREAST: ICD-10-CM

## 2021-10-18 DIAGNOSIS — R60.0 PEDAL EDEMA: ICD-10-CM

## 2021-10-18 DIAGNOSIS — Z78.0 POSTMENOPAUSAL: ICD-10-CM

## 2021-10-18 PROCEDURE — 99214 OFFICE O/P EST MOD 30 MIN: CPT | Performed by: FAMILY MEDICINE

## 2021-10-18 RX ORDER — METOPROLOL SUCCINATE 50 MG/1
50 TABLET, EXTENDED RELEASE ORAL DAILY
Qty: 90 TABLET | Refills: 1 | Status: SHIPPED | OUTPATIENT
Start: 2021-10-18 | End: 2022-05-17

## 2021-10-18 RX ORDER — AMLODIPINE BESYLATE 5 MG/1
5 TABLET ORAL DAILY
Qty: 90 TABLET | Refills: 1 | Status: SHIPPED | OUTPATIENT
Start: 2021-10-18 | End: 2022-05-17

## 2021-10-18 NOTE — PROGRESS NOTES
Assessment and Plan     Problem List Items Addressed This Visit        Cardiac and Vasculature    Benign essential hypertension - Primary    Overview     [facility] 10/18/2021  BP is controlled but in the 130's. She will recheck in six months. She will continue present meds. Up in the office as usual.            Relevant Medications    amLODIPine (NORVASC) 5 MG tablet    metoprolol succinate XL (TOPROL-XL) 50 MG 24 hr tablet    Other Relevant Orders    Comprehensive Metabolic Panel    Lipid Panel With LDL / HDL Ratio    Paroxysmal SVT (supraventricular tachycardia) (HCC)    Overview     [facility] 10/18/2021  On metoprolol and doing find.          Relevant Medications    amLODIPine (NORVASC) 5 MG tablet    metoprolol succinate XL (TOPROL-XL) 50 MG 24 hr tablet       Endocrine and Metabolic    Adult onset hypothyroidism    Overview     [facility] 9/25/2020    Lab Results   Component Value Date    TSH 3.860 07/05/2019             Relevant Medications    metoprolol succinate XL (TOPROL-XL) 50 MG 24 hr tablet    Other Relevant Orders    TSH       Symptoms and Signs    Pedal edema    Overview     Mild. Has been helped by low dose furosemide in the past.            Other Visit Diagnoses     Allergy, initial encounter        Relevant Orders    Ambulatory Referral to Allergy    Encounter for screening mammogram for malignant neoplasm of breast        Relevant Orders    Mammo Screening Bilateral With CAD    Postmenopausal        Relevant Orders    DEXA Bone Density Axial    Encounter for screening for other disorder        Relevant Orders    CBC (No Diff)    Comprehensive Metabolic Panel    Lipid Panel With LDL / HDL Ratio    TSH        Return in about 6 months (around 4/18/2022).    Patient was given instructions and counseling regarding her condition or for health maintenance advice. Please see specific information pulled into the AVS if appropriate.        Lupe is a 71 y.o. being seen today for  Hypertension   Subjective    History of the Present Illness     Answers for HPI/ROS submitted by the patient on 10/15/2021  Please describe your symptoms.: annual visit to discuss my ongoing health problems  Have you had these symptoms before?: Yes  How long have you been having these symptoms?: Greater than 2 weeks  Please list any medications you are currently taking for this condition.: Levothyroxine, Amlodipine, Metropolol and sometimes potassium.  Please describe any probable cause for these symptoms. : My usual symptoms, anxiety, blood pressure, low oxygen     Patient has white coat syndrome but her BPs are reactive, her usual BP is the 130's.   Social History  She  reports that she quit smoking about 41 years ago. Her smoking use included cigarettes. She started smoking about 51 years ago. She has a 5.00 pack-year smoking history. She has never used smokeless tobacco. She reports previous alcohol use. She reports that she does not use drugs.  Objective   Vital Signs  BP Readings from Last 1 Encounters:   10/18/21 170/80     Wt Readings from Last 3 Encounters:   10/18/21 73.5 kg (162 lb)   04/19/21 72.1 kg (159 lb)   09/25/20 73.4 kg (161 lb 12.8 oz)   Body mass index is 32.72 kg/m².     Physical Exam  Vitals reviewed.   Constitutional:       Appearance: Normal appearance. She is well-developed and normal weight.   Cardiovascular:      Rate and Rhythm: Normal rate and regular rhythm.      Heart sounds: Normal heart sounds.   Pulmonary:      Effort: Pulmonary effort is normal.      Breath sounds: Normal breath sounds.   Neurological:      Mental Status: She is alert.   Psychiatric:         Behavior: Behavior normal.         Thought Content: Thought content normal.         Judgment: Judgment normal.

## 2021-10-19 LAB
ALBUMIN SERPL-MCNC: 4.3 G/DL (ref 3.8–4.8)
ALBUMIN/GLOB SERPL: 1.5 {RATIO} (ref 1.2–2.2)
ALP SERPL-CCNC: 110 IU/L (ref 44–121)
ALT SERPL-CCNC: 12 IU/L (ref 0–32)
AST SERPL-CCNC: 17 IU/L (ref 0–40)
BILIRUB SERPL-MCNC: 0.3 MG/DL (ref 0–1.2)
BUN SERPL-MCNC: 13 MG/DL (ref 8–27)
BUN/CREAT SERPL: 21 (ref 12–28)
CALCIUM SERPL-MCNC: 9.2 MG/DL (ref 8.7–10.3)
CHLORIDE SERPL-SCNC: 97 MMOL/L (ref 96–106)
CHOLEST SERPL-MCNC: 231 MG/DL (ref 100–199)
CO2 SERPL-SCNC: 33 MMOL/L (ref 20–29)
CREAT SERPL-MCNC: 0.63 MG/DL (ref 0.57–1)
ERYTHROCYTE [DISTWIDTH] IN BLOOD BY AUTOMATED COUNT: 13.3 % (ref 11.7–15.4)
GLOBULIN SER CALC-MCNC: 2.9 G/DL (ref 1.5–4.5)
GLUCOSE SERPL-MCNC: 89 MG/DL (ref 65–99)
HCT VFR BLD AUTO: 41.2 % (ref 34–46.6)
HDLC SERPL-MCNC: 68 MG/DL
HGB BLD-MCNC: 12.7 G/DL (ref 11.1–15.9)
LDLC SERPL CALC-MCNC: 143 MG/DL (ref 0–99)
LDLC/HDLC SERPL: 2.1 RATIO (ref 0–3.2)
MCH RBC QN AUTO: 26.8 PG (ref 26.6–33)
MCHC RBC AUTO-ENTMCNC: 30.8 G/DL (ref 31.5–35.7)
MCV RBC AUTO: 87 FL (ref 79–97)
PLATELET # BLD AUTO: 285 X10E3/UL (ref 150–450)
POTASSIUM SERPL-SCNC: 4.9 MMOL/L (ref 3.5–5.2)
PROT SERPL-MCNC: 7.2 G/DL (ref 6–8.5)
RBC # BLD AUTO: 4.74 X10E6/UL (ref 3.77–5.28)
SODIUM SERPL-SCNC: 140 MMOL/L (ref 134–144)
TRIGL SERPL-MCNC: 116 MG/DL (ref 0–149)
TSH SERPL DL<=0.005 MIU/L-ACNC: 3.6 UIU/ML (ref 0.45–4.5)
VLDLC SERPL CALC-MCNC: 20 MG/DL (ref 5–40)
WBC # BLD AUTO: 6.2 X10E3/UL (ref 3.4–10.8)

## 2021-11-15 DIAGNOSIS — E03.8 ADULT ONSET HYPOTHYROIDISM: ICD-10-CM

## 2021-11-16 RX ORDER — LEVOTHYROXINE SODIUM 0.07 MG/1
TABLET ORAL
Qty: 90 TABLET | Refills: 2 | Status: SHIPPED | OUTPATIENT
Start: 2021-11-16 | End: 2022-08-15

## 2022-01-20 ENCOUNTER — TELEPHONE (OUTPATIENT)
Dept: FAMILY MEDICINE CLINIC | Facility: CLINIC | Age: 72
End: 2022-01-20

## 2022-01-20 DIAGNOSIS — R92.8 ABNORMAL MAMMOGRAM: Primary | ICD-10-CM

## 2022-01-28 DIAGNOSIS — R92.8 ABNORMAL MAMMOGRAM: Primary | ICD-10-CM

## 2022-02-01 ENCOUNTER — TELEPHONE (OUTPATIENT)
Dept: FAMILY MEDICINE CLINIC | Facility: CLINIC | Age: 72
End: 2022-02-01

## 2022-02-01 NOTE — TELEPHONE ENCOUNTER
Caller: Lupe Becker    Relationship: Self    Best call back number:     What is the best time to reach you:     Who are you requesting to speak with (clinical staff, provider,  specific staff member):     Do you know the name of the person who called:     What was the call regarding: PATIENT IS CALLING IN STATING THAT SHE IS TO HAVE A BIOPSY AND NEEDS TO KNOW IF DR ANDERSON HAS FILLED OUT THE DOCUMENTS FROM [place] AND SENT THEM BACK TO THE [facility].  SHE WANTS TO BE CALLED BACK ABOUT THIS.     Do you require a callback: YES

## 2022-02-02 DIAGNOSIS — R92.8 ABNORMAL MAMMOGRAM: Primary | ICD-10-CM

## 2022-02-18 ENCOUNTER — HOSPITAL ENCOUNTER (OUTPATIENT)
Dept: MAMMOGRAPHY | Facility: HOSPITAL | Age: 72
Discharge: HOME | End: 2022-02-18
Admitting: FAMILY MEDICINE

## 2022-02-18 VITALS
TEMPERATURE: 98 F | DIASTOLIC BLOOD PRESSURE: 79 MMHG | HEART RATE: 61 BPM | SYSTOLIC BLOOD PRESSURE: 171 MMHG | RESPIRATION RATE: 22 BRPM | WEIGHT: 158 LBS | OXYGEN SATURATION: 92 % | BODY MASS INDEX: 31.85 KG/M2 | HEIGHT: 59 IN

## 2022-02-18 DIAGNOSIS — R92.8 ABNORMAL MAMMOGRAM: ICD-10-CM

## 2022-02-18 PROCEDURE — 0 LIDOCAINE 1 % SOLUTION: Performed by: FAMILY MEDICINE

## 2022-02-18 PROCEDURE — 88305 TISSUE EXAM BY PATHOLOGIST: CPT | Performed by: FAMILY MEDICINE

## 2022-02-18 RX ORDER — LIDOCAINE HYDROCHLORIDE 10 MG/ML
11 INJECTION, SOLUTION INFILTRATION; PERINEURAL ONCE
Status: COMPLETED | OUTPATIENT
Start: 2022-02-18 | End: 2022-02-18

## 2022-02-18 RX ADMIN — LIDOCAINE HYDROCHLORIDE 11 ML: 10 INJECTION, SOLUTION INFILTRATION; PERINEURAL at 08:58

## 2022-02-18 NOTE — H&P
Name: Lupe Becker ADMIT: 2022   : 1950  PCP: Kiley Wei MD    MRN: [id number] LOS: 0 days   AGE/SEX: 71 y.o. female  ROOM: Room/bed info not found       Chief complaint R breast asymm    Present Illness or Internal History:  Patient is a 71 y.o. female presents with R breast asymm for stereo bx.     Past Surgical History:  Past Surgical History:   Procedure Laterality Date   • ADENOIDECTOMY     • APPENDECTOMY     • CARDIAC ABLATION     • COLONOSCOPY N/A 2011    Severiano Aly MD; [facility]; WNL other than diverticula pocket   • COLONOSCOPY N/A 1994    performed at [facility]; Severiano Aly MD   • COLONOSCOPY N/A 10/26/2017    Procedure: COLONOSCOPY TO CECUM WITH HOT SNARE POLYPECTOMY AND RESOLUTION CLIP X 1 PLACEMENT TO TRANSVERSE COLON POLYP SITE;  Surgeon: Yamel Harrington MD;  Location: [facility] ENDOSCOPY;  Service:    • RHINOPLASTY     • TONSILLECTOMY         Past Medical History:  Past Medical History:   Diagnosis Date   • Abdominal pain    • Anxiety     about health   • Bicornate uterus 10/10/2016   • Breast lump 10/10/2016    Description: right   • Cellulitis of left lower extremity    • Constipation    • Disease of thyroid gland     adult onset hypothyroidism   • Diverticulosis    • Dysrhythmia     WPW syndrome   •  3, not currently pregnant      A2   • Hypertension    • Hypoxia    • Mitral valve regurgitation    • Palpitations    • Pleural effusion     Recent pneumonia   • Pneumonia 2017   • Rectal bleeding    • Scoliosis    • Tachycardia    • Lis-Parkinson-White (WPW) pattern        Home Medications:  (Not in a hospital admission)      Allergies:  Epinephrine and Sulfites    Family History:  Family History   Problem Relation Age of Onset   • Ovarian cancer Mother    • Cancer Mother          from ovarian cancer   • Cancer Father    • Colon cancer Father    • No Known Problems Brother    • No Known Problems Daughter    • Heart disease  Maternal Grandmother        Social History:  Social History     Tobacco Use   • Smoking status: Former Smoker     Packs/day: 0.50     Years: 10.00     Pack years: 5.00     Types: Cigarettes     Start date: 1970     Quit date: 1980     Years since quittin.1   • Smokeless tobacco: Never Used   Substance Use Topics   • Alcohol use: Not Currently     Alcohol/week: 0.0 standard drinks     Comment: very rarely   • Drug use: No        Objective     Physical Exam:    No exam performed today,    Vital Signs  Temp:  [98 °F (36.7 °C)] 98 °F (36.7 °C)  Heart Rate:  [67] 67  Resp:  [22] 22  BP: (192)/(83) 192/83    Anticipated Surgical Procedure:  Right breast stereotactic/tomosynthesis guided core needle biopsy    The risks, benefits and alternatives of this procedure have been discussed with the patient or responsible party: Yes        Syeda Rodriguez MD  22  08:46 EST

## 2022-02-18 NOTE — NURSING NOTE
Biopsy site to right breast clear with Exofin dry and intact. Small amount of fresh blood present and dried in Exofin. Small hematoma noted around biopsy site (approximately 2.5 cm x 2.0 cm).. Denies pain. Wide Ace pressure wrap applied in lieu of bra per hematoma protocol. Ice pack with protective covering applied to biopsy site. Discharge instructions discussed with understanding voiced by patient. Copies provided to patient. No distress noted. To home via private vehicle.

## 2022-02-21 LAB
LAB AP CASE REPORT: NORMAL
PATH REPORT.FINAL DX SPEC: NORMAL
PATH REPORT.GROSS SPEC: NORMAL

## 2022-05-06 ENCOUNTER — TELEPHONE (OUTPATIENT)
Dept: FAMILY MEDICINE CLINIC | Facility: CLINIC | Age: 72
End: 2022-05-06

## 2022-05-06 NOTE — TELEPHONE ENCOUNTER
7  Caller: SLOANE SHARP    Relationship to patient: Other    Best call back number: [phone]    Patient is needing: PATIENT'S INSURANCE CALLED NEEDING A CODING ERROR CORRECTED. SLOANE NEEDS  NEEDS TO CALL [employer] ABOUT THE BONE DENSITY SCREENING. PLEASE ASK THEM TO CHANGE THE DIAGNOSIS PROCEDURE CODE TO I36191.

## 2022-05-16 DIAGNOSIS — I47.1 PAROXYSMAL SVT (SUPRAVENTRICULAR TACHYCARDIA): ICD-10-CM

## 2022-05-16 DIAGNOSIS — I10 BENIGN ESSENTIAL HYPERTENSION: ICD-10-CM

## 2022-05-17 RX ORDER — AMLODIPINE BESYLATE 5 MG/1
TABLET ORAL
Qty: 90 TABLET | Refills: 1 | Status: SHIPPED | OUTPATIENT
Start: 2022-05-17 | End: 2022-12-27

## 2022-05-17 RX ORDER — METOPROLOL SUCCINATE 50 MG/1
TABLET, EXTENDED RELEASE ORAL
Qty: 90 TABLET | Refills: 1 | Status: SHIPPED | OUTPATIENT
Start: 2022-05-17 | End: 2022-11-22

## 2022-05-17 NOTE — TELEPHONE ENCOUNTER
Spoke with James, they cannot look up the claim without a billable charge amount. Vladislav performed this bone density so I am not able to tell what the charge was. Patient has the information at home and will call me back once she is available to discuss.

## 2022-05-20 RX ORDER — FUROSEMIDE 20 MG/1
20 TABLET ORAL 2 TIMES DAILY
Qty: 30 TABLET | Refills: 0 | Status: SHIPPED | OUTPATIENT
Start: 2022-05-20

## 2022-07-15 NOTE — PROGRESS NOTES
She ASSESSMENT AND PLAN     Problem List Items Addressed This Visit        Cardiac and Vasculature    Benign essential hypertension - Primary    Overview     [facility] 7/18/2022   I do not think her blood pressure is adequately controlled.  I agree that her anxiety is probably contributing to her elevated blood pressure and recommend that we try a low-dose of sertraline to help with that.  She is agreeable and we will recheck in 4 to 6 weeks in regard to how that works.           Relevant Orders    POC Urinalysis Dipstick, Automated       Endocrine and Metabolic    Adult onset hypothyroidism    Overview     [facility] 7/18/2022 labs this week  Lab Results   Component Value Date    TSH 3.600 10/18/2021                  Genitourinary and Reproductive     Abnormal presence of protein in urine    Relevant Orders    POC Urinalysis Dipstick, Automated      Other Visit Diagnoses     Anxiety        Relevant Medications    sertraline (Zoloft) 25 MG tablet    Encounter for screening for other disorder        Relevant Orders    CBC (No Diff)    Comprehensive Metabolic Panel    Lipid Panel With LDL / HDL Ratio    TSH        Return in about 4 weeks (around 8/15/2022).  Patient was given instructions and counseling regarding her condition or for health maintenance advice. Please see specific information pulled into the AVS if appropriate.        Lupe Becker is a 71 y.o. female being seen on video today via Video Options: MyChart/Zoom for Hypertension     We were unable to connect with the video portion so we conducted our visit via zoom audio. She could see me clearly but could not get her Mac to cooperate and allow me to see her.     Her anxiety is really bothering her. She thinks that is driving up her BP.  She gets mid 130s to low 140s after she sits for a while.  While she consistently gets elevated blood pressures in the doctor's office I am concerned that she may also be having issues when she is also anxious.  She thinks  it may be a different type of anxiety but nonetheless she is not checking her blood pressure enough for me to feel comfortable with that interpretation.  She is also due for blood work.  Social History  She  reports that she quit smoking about 42 years ago. Her smoking use included cigarettes. She started smoking about 52 years ago. She has a 5.00 pack-year smoking history. She has never used smokeless tobacco. She reports previous alcohol use. She reports that she does not use drugs.  History of the Present Illness   No complaints.  Taking meds as ordered.  No headache, chest pain or shortness of breath.       BP Readings from Last 3 Encounters:   07/18/22 140/78   02/18/22 171/79   10/18/21 170/80      Wt Readings from Last 3 Encounters:   07/18/22 70.3 kg (155 lb)   02/18/22 71.7 kg (158 lb)   10/18/21 73.5 kg (162 lb)     Body mass index is 31.31 kg/m².    OBJECTIVE  Additional parties in room with patient during teleconsult: none  Physical Exam  Vitals reviewed.   Neurological:      Mental Status: She is alert.   Psychiatric:         Mood and Affect: Mood normal.         Behavior: Behavior normal.         Thought Content: Thought content normal.

## 2022-07-18 ENCOUNTER — TELEMEDICINE (OUTPATIENT)
Dept: FAMILY MEDICINE CLINIC | Facility: CLINIC | Age: 72
End: 2022-07-18

## 2022-07-18 VITALS
BODY MASS INDEX: 31.25 KG/M2 | DIASTOLIC BLOOD PRESSURE: 78 MMHG | WEIGHT: 155 LBS | HEIGHT: 59 IN | SYSTOLIC BLOOD PRESSURE: 140 MMHG

## 2022-07-18 DIAGNOSIS — I10 BENIGN ESSENTIAL HYPERTENSION: Primary | ICD-10-CM

## 2022-07-18 DIAGNOSIS — F41.9 ANXIETY: ICD-10-CM

## 2022-07-18 DIAGNOSIS — Z13.89 ENCOUNTER FOR SCREENING FOR OTHER DISORDER: ICD-10-CM

## 2022-07-18 DIAGNOSIS — E03.8 ADULT ONSET HYPOTHYROIDISM: ICD-10-CM

## 2022-07-18 DIAGNOSIS — R80.9 PROTEINURIA, UNSPECIFIED TYPE: ICD-10-CM

## 2022-07-18 PROCEDURE — 99214 OFFICE O/P EST MOD 30 MIN: CPT | Performed by: FAMILY MEDICINE

## 2022-07-18 RX ORDER — SERTRALINE HYDROCHLORIDE 25 MG/1
25 TABLET, FILM COATED ORAL DAILY
Qty: 30 TABLET | Refills: 1 | Status: SHIPPED | OUTPATIENT
Start: 2022-07-18

## 2022-07-23 LAB
ALBUMIN SERPL-MCNC: 4.3 G/DL (ref 3.8–4.8)
ALBUMIN/GLOB SERPL: 1.5 {RATIO} (ref 1.2–2.2)
ALP SERPL-CCNC: 104 IU/L (ref 44–121)
ALT SERPL-CCNC: 9 IU/L (ref 0–32)
AST SERPL-CCNC: 16 IU/L (ref 0–40)
BILIRUB SERPL-MCNC: 0.4 MG/DL (ref 0–1.2)
BUN SERPL-MCNC: 9 MG/DL (ref 8–27)
BUN/CREAT SERPL: 14 (ref 12–28)
CALCIUM SERPL-MCNC: 9.4 MG/DL (ref 8.7–10.3)
CHLORIDE SERPL-SCNC: 97 MMOL/L (ref 96–106)
CHOLEST SERPL-MCNC: 210 MG/DL (ref 100–199)
CO2 SERPL-SCNC: 32 MMOL/L (ref 20–29)
CREAT SERPL-MCNC: 0.63 MG/DL (ref 0.57–1)
EGFRCR SERPLBLD CKD-EPI 2021: 95 ML/MIN/1.73
ERYTHROCYTE [DISTWIDTH] IN BLOOD BY AUTOMATED COUNT: 13.7 % (ref 11.7–15.4)
GLOBULIN SER CALC-MCNC: 2.9 G/DL (ref 1.5–4.5)
GLUCOSE SERPL-MCNC: 93 MG/DL (ref 65–99)
HCT VFR BLD AUTO: 42.4 % (ref 34–46.6)
HDLC SERPL-MCNC: 70 MG/DL
HGB BLD-MCNC: 13.3 G/DL (ref 11.1–15.9)
LDLC SERPL CALC-MCNC: 120 MG/DL (ref 0–99)
LDLC/HDLC SERPL: 1.7 RATIO (ref 0–3.2)
MCH RBC QN AUTO: 27.3 PG (ref 26.6–33)
MCHC RBC AUTO-ENTMCNC: 31.4 G/DL (ref 31.5–35.7)
MCV RBC AUTO: 87 FL (ref 79–97)
PLATELET # BLD AUTO: 262 X10E3/UL (ref 150–450)
POTASSIUM SERPL-SCNC: 4.4 MMOL/L (ref 3.5–5.2)
PROT SERPL-MCNC: 7.2 G/DL (ref 6–8.5)
RBC # BLD AUTO: 4.88 X10E6/UL (ref 3.77–5.28)
SODIUM SERPL-SCNC: 142 MMOL/L (ref 134–144)
TRIGL SERPL-MCNC: 113 MG/DL (ref 0–149)
TSH SERPL DL<=0.005 MIU/L-ACNC: 3.75 UIU/ML (ref 0.45–4.5)
VLDLC SERPL CALC-MCNC: 20 MG/DL (ref 5–40)
WBC # BLD AUTO: 6 X10E3/UL (ref 3.4–10.8)

## 2022-08-13 DIAGNOSIS — E03.8 ADULT ONSET HYPOTHYROIDISM: ICD-10-CM

## 2022-08-15 RX ORDER — LEVOTHYROXINE SODIUM 0.07 MG/1
TABLET ORAL
Qty: 90 TABLET | Refills: 0 | Status: SHIPPED | OUTPATIENT
Start: 2022-08-15 | End: 2022-11-21

## 2022-11-20 DIAGNOSIS — E03.8 ADULT ONSET HYPOTHYROIDISM: ICD-10-CM

## 2022-11-21 RX ORDER — LEVOTHYROXINE SODIUM 0.07 MG/1
TABLET ORAL
Qty: 90 TABLET | Refills: 2 | Status: SHIPPED | OUTPATIENT
Start: 2022-11-21

## 2022-11-22 DIAGNOSIS — I10 BENIGN ESSENTIAL HYPERTENSION: ICD-10-CM

## 2022-11-22 DIAGNOSIS — I47.1 PAROXYSMAL SVT (SUPRAVENTRICULAR TACHYCARDIA): ICD-10-CM

## 2022-11-22 RX ORDER — METOPROLOL SUCCINATE 50 MG/1
TABLET, EXTENDED RELEASE ORAL
Qty: 90 TABLET | Refills: 1 | Status: SHIPPED | OUTPATIENT
Start: 2022-11-22

## 2022-12-26 DIAGNOSIS — I10 BENIGN ESSENTIAL HYPERTENSION: ICD-10-CM

## 2022-12-27 RX ORDER — AMLODIPINE BESYLATE 5 MG/1
5 TABLET ORAL DAILY
Qty: 30 TABLET | Refills: 0 | Status: SHIPPED | OUTPATIENT
Start: 2022-12-27 | End: 2023-01-27 | Stop reason: SDUPTHER

## 2023-01-27 ENCOUNTER — OFFICE VISIT (OUTPATIENT)
Dept: FAMILY MEDICINE CLINIC | Facility: CLINIC | Age: 73
End: 2023-01-27
Payer: COMMERCIAL

## 2023-01-27 VITALS
BODY MASS INDEX: 31.65 KG/M2 | HEIGHT: 59 IN | RESPIRATION RATE: 18 BRPM | HEART RATE: 77 BPM | DIASTOLIC BLOOD PRESSURE: 100 MMHG | OXYGEN SATURATION: 91 % | WEIGHT: 157 LBS | SYSTOLIC BLOOD PRESSURE: 190 MMHG

## 2023-01-27 DIAGNOSIS — I10 BENIGN ESSENTIAL HYPERTENSION: ICD-10-CM

## 2023-01-27 PROCEDURE — 99214 OFFICE O/P EST MOD 30 MIN: CPT | Performed by: FAMILY MEDICINE

## 2023-01-27 RX ORDER — AMLODIPINE BESYLATE 10 MG/1
10 TABLET ORAL DAILY
Qty: 90 TABLET | Refills: 1 | Status: SHIPPED | OUTPATIENT
Start: 2023-01-27

## 2023-01-27 NOTE — PROGRESS NOTES
Assessment and Plan     Patient Instructions    Problem List Items Addressed This Visit        Cardiac and Vasculature    Benign essential hypertension    Overview     Binh 1/27/2023  Bp med needs to be increased to 10 mg Amlodipine. She sees the cardiologist in February.  [facility] 7/18/2022   I do not think her blood pressure is adequately controlled.  I agree that her anxiety is probably contributing to her elevated blood pressure and recommend that we try a low-dose of sertraline to help with that.  She is agreeable and we will recheck in 4 to 6 weeks in regard to how that works.         Relevant Medications    amLODIPine (NORVASC) 10 MG tablet            Lupe is a 72 y.o. being seen today for  Hypertension   Subjective   History of the Present Illness   She was angry when she got here. She got frustrated with trying to get into the office. Her BP is very reactive. She has been checking it -- runs 138/78-82.  She had amlodipine 10 mg in the hospital but when she had it refilled it was only for 5 mg. (from us). Please describe your symptoms.: I just need for her to look at a few things (chronic) and discuss medications with her that are ongoing .  Have you had these symptoms before?: Yes  How long have you been having these symptoms?: Greater than 2 weeks  Please list any medications you are currently taking for this condition.: I take Amlodipine, Levothyroxine and Metropolol.  Social History  She  reports that she quit smoking about 43 years ago. Her smoking use included cigarettes. She started smoking about 53 years ago. She has a 5.00 pack-year smoking history. She has never used smokeless tobacco. She reports that she does not currently use alcohol. She reports that she does not use drugs.  Objective   Vital Signs        BP Readings from Last 1 Encounters:   01/27/23 (!) 190/100     Wt Readings from Last 3 Encounters:   01/27/23 71.2 kg (157 lb)   07/18/22 70.3 kg (155 lb)   02/18/22 71.7 kg (158 lb)    Body mass index is 31.71 kg/m².     Physical Exam  Vitals reviewed.   Constitutional:       Appearance: Normal appearance. She is well-developed and normal weight.      Comments: Mask in place    HENT:      Right Ear: Tympanic membrane, ear canal and external ear normal.   Cardiovascular:      Rate and Rhythm: Normal rate and regular rhythm.      Heart sounds: Normal heart sounds.   Pulmonary:      Effort: Pulmonary effort is normal.      Breath sounds: Normal breath sounds.   Neurological:      Mental Status: She is alert.   Psychiatric:         Behavior: Behavior normal.         Thought Content: Thought content normal.         Judgment: Judgment normal.               Patient was given instructions and counseling regarding her condition or for health maintenance advice. Please see specific information pulled into the AVS if appropriate.    Answers for HPI/ROS submitted by the patient on 1/26/2023    What is the primary reason for your visit?: Other

## 2023-02-15 ENCOUNTER — TELEPHONE (OUTPATIENT)
Dept: FAMILY MEDICINE CLINIC | Facility: CLINIC | Age: 73
End: 2023-02-15

## 2023-02-15 NOTE — TELEPHONE ENCOUNTER
Caller: Lupe Becker    Relationship: Self    Best call back number: [phone]    What was the call regarding: PATIENT WOULD LIKE A CALL BACK TO LET HER KNOW WHEN A FAX FROM [facility] HAS BEEN RECEIVED. PLEASE ADVISE.    Do you require a callback: YES

## 2023-05-21 DIAGNOSIS — I47.1 PAROXYSMAL SVT (SUPRAVENTRICULAR TACHYCARDIA): ICD-10-CM

## 2023-05-21 DIAGNOSIS — I10 BENIGN ESSENTIAL HYPERTENSION: ICD-10-CM

## 2023-05-22 RX ORDER — METOPROLOL SUCCINATE 50 MG/1
TABLET, EXTENDED RELEASE ORAL
Qty: 30 TABLET | Refills: 0 | Status: SHIPPED | OUTPATIENT
Start: 2023-05-22

## 2023-05-22 NOTE — TELEPHONE ENCOUNTER
Last visit 01/27/23  No follow-up scheduled     Changed sig to 30 disp and no refills with note to pharmacy

## 2023-06-14 NOTE — PLAN OF CARE
Problem: Patient Care Overview (Adult)  Goal: Plan of Care Review  Outcome: Ongoing (interventions implemented as appropriate)    04/16/17 0954   Coping/Psychosocial Response Interventions   Plan Of Care Reviewed With patient   Patient Care Overview   Progress improving   Outcome Evaluation   Outcome Summary/Follow up Plan Pt improving with transfers and amb distance with RWX            none

## 2023-06-24 NOTE — PROGRESS NOTES
Acute Care - Speech Language Pathology   Swallow Re-Evaluation [facility]     Patient Name: Lupe Becker  : 1950  MRN: [id number]  Today's Date: 2017  Onset of Illness/Injury or Date of Surgery Date: 17            Admit Date: 3/31/2017    SPEECH-LANGUAGE PATHOLOGY EVALUTION - VFSS  Subjective: The patient was seen on this date for a VFSS(Videofluoroscopic Swallowing Study).  Patient was alert and cooperative.    Objective: Risks/benefits were reviewed with the patient, and consent was obtained. The study was completed with SLP and Radiologist present. The patient was seen in lateral view(s). Textures given included thin liquid, puree consistency, mechanical soft consistency and regular consistency.  Assessment: No penetration or aspiration was noted with any consistencies. Patient had adequate hyolaryngeal excursion, mastication, bolus formation and AP transit.    SLP Findings: Patient presents with functional swallow.   Recommendations: Diet Textures: thin liquid, regular consistency food. Medications should be taken whole with thin liquids.   Recommended Strategies: Upright for PO. Oral care before breakfast, after all meals and PRN.  Dysphagia therapy is not recommended. Rationale: swallow is functional.      Visit Dx:     ICD-10-CM ICD-9-CM   1. Sepsis, due to unspecified organism A41.9 038.9     995.91   2. Acute respiratory failure with hypoxia and hypercapnia J96.01 518.81    J96.02    3. Pneumonia of right lower lobe due to infectious organism J18.9 483.8   4. Leukocytosis, unspecified type D72.829 288.60   5. Generalized weakness R53.1 780.79     Patient Active Problem List   Diagnosis   • Accelerated essential hypertension   • Hypoxia   • Cervical fusion syndrome   • Abnormal presence of protein in urine   • Fast heart beat   • Ventricular pre-excitation with arrhythmia   • Adult onset hypothyroidism   • Anxiety about health   • Sepsis   • Paroxysmal SVT (supraventricular  tachycardia)     Past Medical History:   Diagnosis Date   • Bicornate uterus 10/10/2016   • Breast lump 10/10/2016    Description: right   • Hypertension    • Mitral valve regurgitation    • Lis-Parkinson-White (WPW) pattern      Past Surgical History:   Procedure Laterality Date   • APPENDECTOMY     • RHINOPLASTY     • TONSILLECTOMY            SWALLOW EVALUATION (last 72 hours)      Swallow Evaluation       04/14/17 1441 04/13/17 1152             Rehab Evaluation    Document Type (P)  evaluation  -AT evaluation  -NB       Subjective Information (P)  no complaints;agree to therapy  -AT no complaints;agree to therapy  -NB (r) AT (t) NB (c)       Patient Effort, Rehab Treatment (P)  good  -AT good  -NB (r) AT (t) NB (c)       Symptoms Noted During/After Treatment (P)  none  -AT none  -NB (r) AT (t) NB (c)       General Information    Patient Profile Review (P)  yes  -AT yes  -NB (r) AT (t) NB (c)       Current Diet Limitations (P)  NPO  -AT NPO  -NB (r) AT (t) NB (c)       Precautions/Limitations, Vision (P)  WFL  -AT WFL  -NB (r) AT (t) NB (c)       Precautions/Limitations, Hearing (P)  WFL  -AT WFL  -NB (r) AT (t) NB (c)       Prior Level of Function- Communication (P)  functional in all spheres  -AT functional in all spheres  -NB (r) AT (t) NB (c)       Prior Level of Function- Swallowing (P)  no diet consistency restrictions  -AT no diet consistency restrictions  -NB (r) AT (t) NB (c)       Plans/Goals Discussed With (P)  patient  -AT patient;family  -NB (r) AT (t) NB (c)       Barriers to Rehab (P)  medically complex  -AT medically complex  -NB (r) AT (t) NB (c)       Clinical Impression    Patient's Goals For Discharge (P)  patient did not state  -AT return to PO diet  -NB (r) AT (t) NB (c)       Family Goals For Discharge  family did not state  -NB (r) AT (t) NB (c)       SLP Swallowing Diagnosis (P)  other (see comments)   functional swallow  -AT other (see comments)   rule out oropharyngeal dysphagia  -NB  (r) AT (t) NB (c)       Rehab Potential/Prognosis, Swallowing  good, to achieve stated therapy goals  -NB (r) AT (t) NB (c)       Criteria for Skilled Therapeutic Interventions Met (P)  no problems identified which require skilled intervention  -AT skilled criteria for dysphagia intervention met  -NB (r) AT (t) NB (c)       Therapy Frequency (P)  evaluation only  -AT PRN  -NB (r) AT (t) NB (c)       Predicted Duration Therapy Interv (days)  until discharge  -NB (r) AT (t) NB (c)       Expected Duration Therapy Session (min)  15-30 minutes  -NB (r) AT (t) NB (c)       SLP Diet Recommendation (P)  regular textures;thin liquids  -AT NPO: unsafe for food/liquid intake  -NB (r) AT (t) NB (c)       Recommended Diagnostics  VFSS (MBS)  -NB (r) AT (t) NB (c)       SLP Rec. for Method of Medication Administration (P)  meds whole with thin liquid  -AT meds whole in pudding/applesauce  -NB (r) AT (t) NB (c)       Monitor For Signs Of Aspiration (P)  cough;gurgly voice;throat clearing;fever  -AT cough;gurgly voice;throat clearing;fever  -NB (r) AT (t) NB (c)       Anticipated Discharge Disposition (P)  other (see comments)   unknown  -AT other (see comments)   unknown  -NB (r) AT (t) NB (c)       Pain Assessment    Pain Assessment (P)  No/denies pain  -AT No/denies pain  -NB (r) AT (t) NB (c)       Cognitive Assessment/Intervention    Current Cognitive/Communication Assessment (P)  functional  -AT functional  -NB (r) AT (t) NB (c)       Orientation Status (P)  oriented x 4  -AT oriented x 4  -NB (r) AT (t) NB (c)       Follows Commands/Answers Questions (P)  100% of the time  -% of the time  -NB (r) AT (t) NB (c)       Oral Motor Structure and Function    Oral Motor Anatomy and Physiology (P)  patient demonstrates anatomy that is WNL  -AT patient demonstrates anatomy that is WNL  -NB (r) AT (t) NB (c)       Dentition Assessment (P)  present and adequate  -AT present and adequate  -NB (r) AT (t) NB (c)       Secretion  Management (P)  WNL/WFL  -AT WNL/WFL  -NB (r) AT (t) NB (c)       Mucosal Quality (P)  moist, healthy  -AT moist, healthy  -NB (r) AT (t) NB (c)       Velar Elevation (P)  WNL (within normal limits)  -AT WFL (within functional limits)  -NB (r) AT (t) NB (c)       Volitional Swallow (P)  no difficulties initiating volitional swallow  -AT no difficulties initiating volitional swallow  -NB (r) AT (t) NB (c)       Oral Musculature General Assessment (P)  WNL (within normal limits)  -AT WFL (within functional limits)  -NB (r) AT (t) NB (c)       SLP Communication to Radiology    Summary Statement (P)  Functional swallow; no penetration or aspiration with any consistencies; adequate mastication, bolus formaiton, and AP transit  -AT          User Key  (r) = Recorded By, (t) = Taken By, (c) = Cosigned By    Initials Name Effective Dates    NB Fatmata Duarte MS CCC-SLP 04/13/15 -     AT Marquita Pettit Speech Therapy Student 12/28/16 -         EDUCATION  The patient has been educated in the following areas:   Dysphagia (Swallowing Impairment).    SLP Recommendation and Plan  SLP Swallowing Diagnosis: (P) other (see comments) (functional swallow)  SLP Diet Recommendation: (P) regular textures, thin liquids     SLP Rec. for Method of Medication Administration: (P) meds whole with thin liquid  Monitor For Signs Of Aspiration: (P) cough, gurgly voice, throat clearing, fever  Recommended Diagnostics: VFSS (MBS)  Criteria for Skilled Therapeutic Interventions Met: (P) no problems identified which require skilled intervention  Anticipated Discharge Disposition: (P) other (see comments) (unknown)  Rehab Potential/Prognosis, Swallowing: good, to achieve stated therapy goals  Therapy Frequency: (P) evaluation only             Plan of Care Review  Plan Of Care Reviewed With: (P) patient  Progress: (P) improving  Outcome Summary/Follow up Plan: (P) VFSS; no penetration or aspiration; regular and thin recommended          IP SLP Goals        04/14/17 1440 04/13/17 1151 04/07/17 1336    Begin to Take Some PO Safely    Begin to Take Some PO Safely- SLP, Date Established (P)  04/14/17  -AT 04/13/17  -NB (r) AT (t) NB (c) 04/07/17  -KA    Begin to Take Some PO Safely- SLP, Time to Achieve   by discharge  -KA    Begin to Take Some PO Safely- SLP, Additional Goal (P)  regular and thin recommended  -AT NPO until instrumental examination is completed; ice chips okay after oral care  -NB (r) AT (t) NB (c)       User Key  (r) = Recorded By, (t) = Taken By, (c) = Cosigned By    Initials Name Provider Type    NELDA Duarte MS CCC-SLP Speech and Language Pathologist    MAURICIO Bennett MA,CCC-SLP Speech and Language Pathologist    AT Marquita Pettit, Speech Therapy Student Speech Therapy Student             SLP Outcome Measures (last 72 hours)      SLP Outcome Measures       04/14/17 1400 04/13/17 1100       SLP Outcome Measures    Outcome Measure Used? (P)  Adult NOMS  -AT Adult NOMS  -NB (r) AT (t) NB (c)     FCM Scores    FCM Chosen (P)  Swallowing  -AT Swallowing  -NB (r) AT (t) NB (c)     Swallowing FCM Score (P)  7  -AT 1  -NB (r) AT (t) NB (c)       User Key  (r) = Recorded By, (t) = Taken By, (c) = Cosigned By    Initials Name Effective Dates    NELDA Duarte MS [facility]-SLP 04/13/15 -     AT Marquita Pettit, Speech Therapy Student 12/28/16 -            Time Calculation:         Time Calculation- SLP       04/14/17 1449          Time Calculation- SLP    SLP Start Time (P)  0200  -AT      SLP Stop Time (P)  0315  -AT      SLP Time Calculation (min) (P)  75 min  -AT      SLP Received On (P)  04/14/17  -AT        User Key  (r) = Recorded By, (t) = Taken By, (c) = Cosigned By    Initials Name Provider Type    AT Marquita Pettit, Speech Therapy Student Speech Therapy Student          Therapy Charges for Today     Code Description Service Date Service Provider Modifiers Qty    [id number] HC ST EVAL ORAL PHARYNG SWALLOW 4 4/13/2017 Marquita Pettit, Speech Therapy Student GN  1    [id number] [facility] MOTION FLUORO EVAL SWALLOW 5 4/14/2017 Marquita Pettit, Speech Therapy Student GN 1               Marquita Pettit, Speech Therapy Student  4/14/2017   Paresthesia  Paresthesia is an abnormal burning or prickling sensation. It is usually felt in the hands, arms, legs, or feet. However, it may occur in any part of the body. Usually, paresthesia is not painful. It may feel like:  Tingling or numbness.  Buzzing.  Itching.  Paresthesia may occur without any clear cause, or it may be caused by:  Breathing too quickly (hyperventilation).  Pressure on a nerve.  An underlying medical condition.  Side effects of a medicine.  Nutritional deficiencies.  Exposure to toxic chemicals.  Most people experience temporary (transient) paresthesia at some time in their lives. For some people, it may be long-lasting (chronic) because of an underlying medical condition. If you have paresthesia that lasts a long time, you need to be evaluated by your health care provider.    Follow these instructions at home:  Nutrition    A plate with examples of foods in a healthy diet.  Eat a healthy diet. This includes:  Eating foods that are high in fiber, such as beans, whole grains, and fresh fruits and vegetables.  Limiting foods that are high in fat and processed sugars, such as fried or sweet foods.  Alcohol use    A sign showing that a person should not drink alcohol.  Avoid or limit alcohol. Too much alcohol can cause a vitamin B deficiency, and vitamin B is needed for healthy nerves.  Do not drink alcohol if:  Your health care provider tells you not to drink.  You are pregnant, may be pregnant, or are planning to become pregnant.  If you drink alcohol:  Limit how much you have to:  0–1 drink a day for women.  0–2 drinks a day for men.  Know how much alcohol is in your drink. In the [place] one drink equals one 12 oz bottle of beer (355 mL), one 5 oz glass of wine (148 mL), or one 1½ oz glass of hard liquor (44 mL).  General instructions    Take over-the-counter and prescription medicines only as told by your health care provider.  Do not use any products that contain nicotine or tobacco. These products include cigarettes, chewing tobacco, and vaping devices, such as e-cigarettes. If you need help quitting, ask your health care provider.  If you have diabetes, work closely with your health care provider to keep your blood sugar under control.  If you have numbness in your feet:  Check every day for signs of injury or infection. Watch for redness, warmth, and swelling.  Wear padded socks and comfortable shoes. These help protect your feet.  Keep all follow-up visits. This is important.  Contact a health care provider if you:  Have paresthesia that gets worse or does not go away.  Have numbness after an injury.  Have a burning or prickling feeling that gets worse when you walk.  Have pain, cramps, or dizziness, or you faint.  Develop a rash.  Get help right away if you:  Feel muscle weakness.  Develop new weakness in an arm or leg.  Have trouble walking or moving.  Have problems with speech, understanding, or vision.  Feel confused.  Cannot control your bladder or bowel movements.  These symptoms may be an emergency. Get help right away. Call 911.  Do not wait to see if the symptoms will go away.  Do not drive yourself to the hospital.  Summary  Paresthesia is an abnormal burning or prickling sensation that is usually felt in the hands, arms, legs, or feet. It may also occur in other parts of the body.  Paresthesia may occur without any clear cause, or it may be caused by breathing too quickly (hyperventilation), pressure on a nerve, an underlying medical condition, side effects of a medicine, nutritional deficiencies, or exposure to toxic chemicals.  If you have paresthesia that lasts a long time, you need to be evaluated by your health care provider.  This information is not intended to replace advice given to you by your health care provider. Make sure you discuss any questions you have with your health care provider.

## 2023-08-15 ENCOUNTER — PATIENT MESSAGE (OUTPATIENT)
Dept: FAMILY MEDICINE CLINIC | Facility: CLINIC | Age: 73
End: 2023-08-15
Payer: COMMERCIAL

## 2023-08-15 DIAGNOSIS — E03.8 ADULT ONSET HYPOTHYROIDISM: ICD-10-CM

## 2023-08-15 DIAGNOSIS — E03.8 ADULT ONSET HYPOTHYROIDISM: Primary | ICD-10-CM

## 2023-08-15 RX ORDER — LEVOTHYROXINE SODIUM 0.07 MG/1
75 TABLET ORAL DAILY
Qty: 15 TABLET | Refills: 0 | OUTPATIENT
Start: 2023-08-15

## 2023-08-15 RX ORDER — LEVOTHYROXINE SODIUM 0.07 MG/1
TABLET ORAL
Qty: 15 TABLET | Refills: 0 | Status: SHIPPED | OUTPATIENT
Start: 2023-08-15

## 2023-08-15 NOTE — TELEPHONE ENCOUNTER
From: Lupe Becker  To: Kiley Wei  Sent: 8/15/2023 4:43 PM EDT  Subject: New prescription of Levothyroxine    This only is allowing 15 pills . Why is that?

## 2023-08-23 ENCOUNTER — PATIENT MESSAGE (OUTPATIENT)
Dept: FAMILY MEDICINE CLINIC | Facility: CLINIC | Age: 73
End: 2023-08-23
Payer: COMMERCIAL

## 2023-08-23 DIAGNOSIS — I10 BENIGN ESSENTIAL HYPERTENSION: Primary | ICD-10-CM

## 2023-08-23 DIAGNOSIS — Z13.89 ENCOUNTER FOR SCREENING FOR OTHER DISORDER: ICD-10-CM

## 2023-08-24 NOTE — TELEPHONE ENCOUNTER
From: Lupe Becker  To: Kiley Wei  Sent: 8/23/2023 12:57 PM EDT  Subject: Test results    Kiley, were there more results of other tests - metabolic panels, etc.?

## 2023-09-06 LAB
ALBUMIN SERPL-MCNC: 4.5 G/DL (ref 3.5–5.2)
ALBUMIN/GLOB SERPL: 1.7 G/DL
ALP SERPL-CCNC: 94 U/L (ref 39–117)
ALT SERPL-CCNC: 12 U/L (ref 1–33)
AST SERPL-CCNC: 17 U/L (ref 1–32)
BILIRUB SERPL-MCNC: 0.6 MG/DL (ref 0–1.2)
BUN SERPL-MCNC: 12 MG/DL (ref 8–23)
BUN/CREAT SERPL: 21.1 (ref 7–25)
CALCIUM SERPL-MCNC: 9.8 MG/DL (ref 8.6–10.5)
CHLORIDE SERPL-SCNC: 95 MMOL/L (ref 98–107)
CHOLEST SERPL-MCNC: 199 MG/DL (ref 0–200)
CO2 SERPL-SCNC: 32.5 MMOL/L (ref 22–29)
CREAT SERPL-MCNC: 0.57 MG/DL (ref 0.57–1)
EGFRCR SERPLBLD CKD-EPI 2021: 97.3 ML/MIN/1.73
ERYTHROCYTE [DISTWIDTH] IN BLOOD BY AUTOMATED COUNT: 14 % (ref 12.3–15.4)
GLOBULIN SER CALC-MCNC: 2.6 GM/DL
GLUCOSE SERPL-MCNC: 93 MG/DL (ref 65–99)
HCT VFR BLD AUTO: 42 % (ref 34–46.6)
HDLC SERPL-MCNC: 76 MG/DL (ref 40–60)
HGB BLD-MCNC: 13.5 G/DL (ref 12–15.9)
LDLC SERPL CALC-MCNC: 106 MG/DL (ref 0–100)
LDLC/HDLC SERPL: 1.36 {RATIO}
MCH RBC QN AUTO: 27.7 PG (ref 26.6–33)
MCHC RBC AUTO-ENTMCNC: 32.1 G/DL (ref 31.5–35.7)
MCV RBC AUTO: 86.2 FL (ref 79–97)
PLATELET # BLD AUTO: 252 10*3/MM3 (ref 140–450)
POTASSIUM SERPL-SCNC: 4.1 MMOL/L (ref 3.5–5.2)
PROT SERPL-MCNC: 7.1 G/DL (ref 6–8.5)
RBC # BLD AUTO: 4.87 10*6/MM3 (ref 3.77–5.28)
SODIUM SERPL-SCNC: 139 MMOL/L (ref 136–145)
TRIGL SERPL-MCNC: 98 MG/DL (ref 0–150)
VLDLC SERPL CALC-MCNC: 17 MG/DL (ref 5–40)
WBC # BLD AUTO: 6.41 10*3/MM3 (ref 3.4–10.8)

## 2023-11-09 ENCOUNTER — OFFICE VISIT (OUTPATIENT)
Dept: FAMILY MEDICINE CLINIC | Facility: CLINIC | Age: 73
End: 2023-11-09
Payer: COMMERCIAL

## 2023-11-09 VITALS
WEIGHT: 143 LBS | SYSTOLIC BLOOD PRESSURE: 168 MMHG | BODY MASS INDEX: 28.83 KG/M2 | HEART RATE: 74 BPM | OXYGEN SATURATION: 91 % | HEIGHT: 59 IN | RESPIRATION RATE: 18 BRPM | DIASTOLIC BLOOD PRESSURE: 102 MMHG

## 2023-11-09 DIAGNOSIS — L24.9 IRRITANT DERMATITIS: Primary | ICD-10-CM

## 2023-11-09 PROCEDURE — 99213 OFFICE O/P EST LOW 20 MIN: CPT | Performed by: FAMILY MEDICINE

## 2023-11-09 RX ORDER — DILTIAZEM HYDROCHLORIDE 60 MG/1
60 CAPSULE, EXTENDED RELEASE ORAL
COMMUNITY
Start: 2023-10-05

## 2023-11-09 NOTE — PROGRESS NOTES
"Chief Complaint  Herpes Zoster    Subjective    History of Present Illness {CC  Problem List  Visit  Diagnosis   Encounters  Notes  Medications  Labs  Result Review Imaging  Media :23}     Lupe Becker presents to [facility] for Herpes Zoster.  History of Present Illness     Here today with concern for a rash that she is worried might be shingles. Started on her lower abdomen and she now has a patch on her left forearm. Areas are mildly itchy, not painful. No fevers, chills, or prodromal symptoms.    Objective     Vital Signs:   BP (!) 168/102   Pulse 74   Resp 18   Ht 149.9 cm (59\")   Wt 64.9 kg (143 lb)   SpO2 91%   BMI 28.88 kg/m²   Physical Exam  Vitals and nursing note reviewed.   Constitutional:       General: She is not in acute distress.     Appearance: Normal appearance. She is not ill-appearing.   Skin:     Comments: Diffuse flesh-colored maculopapular rash on left forearm and around her waistline. There is some background erythema, no vesicles. Appearance is more consistent with irritant dermatitis.   Neurological:      Mental Status: She is alert.          Result Review  Data Reviewed:{ Labs  Result Review  Imaging  Med Tab  Media :23}                   Assessment and Plan {CC Problem List  Visit Diagnosis  ROS  Review (Popup)  Health Maintenance  Quality  BestPractice  Medications  SmartSets  SnapShot Encounters  Media :23}   Diagnoses and all orders for this visit:    1. Irritant dermatitis (Primary)    Discussed the likely irritant nature of her rash. She cannot identify any recent changes in medications or foods. Recommended sensitive skin soap, emollients after bathing, and as needed over-the-counter topical corticosteroid. Anticipate resolution with time.    Recommended follow up as below. Encouraged communication via Phantom[employer] in the meantime.     Patient was given instructions and counseling regarding her condition or for health " maintenance advice. Please see specific information pulled into the AVS (placed there by myself) if appropriate.    Return if symptoms worsen or fail to improve.    RODOLFO Candelario MD

## 2023-11-17 DIAGNOSIS — I47.10 PAROXYSMAL SVT (SUPRAVENTRICULAR TACHYCARDIA): ICD-10-CM

## 2023-11-17 DIAGNOSIS — I10 BENIGN ESSENTIAL HYPERTENSION: ICD-10-CM

## 2023-11-17 DIAGNOSIS — E03.8 ADULT ONSET HYPOTHYROIDISM: ICD-10-CM

## 2023-11-17 RX ORDER — LEVOTHYROXINE SODIUM 0.07 MG/1
75 TABLET ORAL DAILY
Qty: 90 TABLET | Refills: 3 | Status: SHIPPED | OUTPATIENT
Start: 2023-11-17 | End: 2024-11-16

## 2023-11-17 RX ORDER — METOPROLOL SUCCINATE 50 MG/1
50 TABLET, EXTENDED RELEASE ORAL DAILY
Qty: 90 TABLET | Refills: 1 | Status: SHIPPED | OUTPATIENT
Start: 2023-11-17

## 2023-11-17 NOTE — TELEPHONE ENCOUNTER
Caller: Lupe Becker    Relationship: Self    Best call back number: [phone]    Requested Prescriptions:   Requested Prescriptions     Pending Prescriptions Disp Refills    levothyroxine (SYNTHROID, LEVOTHROID) 75 MCG tablet 90 tablet 3     Sig: Take 1 tablet by mouth Daily.    metoprolol succinate XL (TOPROL-XL) 50 MG 24 hr tablet 90 tablet 1     Sig: Take 1 tablet by mouth Daily.        Pharmacy where request should be sent: [address] [phone] [facility] [phone] [facility]     Last office visit with prescribing clinician: 6/23/2023   Last telemedicine visit with prescribing clinician: Visit date not found   Next office visit with prescribing clinician: Visit date not found     Additional details provided by patient: PATIENT STATES THAT DR. ANDERSON PRESCRIBED HER 50 MG OF THE METOPROLOL. PATIENT STATES THAT HER HEART DOCTOR CUT THIS PRESCRIPTION IN HALF TO 25 MG, DUE TO PRESCRIBING ANOTHER MEDICATION. PLEASE SEND OVER 25 MG OF METOPROLOL ASAP. PATIENT IS NOT OUT OF THIS YET, BUT WOULD LIKE THIS PLACED IN THE SYSTEM.     Does the patient have less than a 3 day supply:  [x] Yes  [] No    Would you like a call back once the refill request has been completed: [] Yes [x] No    If the office needs to give you a call back, can they leave a voicemail: [] Yes [x] No    Carrie García Rep   11/17/23 09:35 EST         DELETE AFTER READING TO PATIENT: “Thank you for sharing this information with me. I will send a message to the clinical team. Please allow 48 hours for the clinical staff to follow up on this request.”

## 2024-10-25 ENCOUNTER — TELEPHONE (OUTPATIENT)
Dept: FAMILY MEDICINE CLINIC | Facility: CLINIC | Age: 74
End: 2024-10-25

## 2024-10-25 NOTE — TELEPHONE ENCOUNTER
Caller: Lupe Becker    Relationship to patient: Self    Best call back number: [phone]      Patient is needing: SHE CALLED TO MAKE A [facility] APPOINTMENT BUT CAN'T GET IN TILL 2/21/24 WITH DR. ANDERSON.  SHE WOULD LIKE TO KNOW IF DR. ANDERSON WILL FILL HER MEDICATION TILL SHE CAN GET IN TO SEE HER OR DOES SHE WANT TO WORK HER IN SOONER?  DOES SHE NEED TO GO ON AND GET BLOODWORK DONE?

## 2024-11-25 ENCOUNTER — OFFICE VISIT (OUTPATIENT)
Dept: FAMILY MEDICINE CLINIC | Facility: CLINIC | Age: 74
End: 2024-11-25
Payer: MEDICARE

## 2024-11-25 VITALS — BODY MASS INDEX: 27.42 KG/M2 | HEIGHT: 59 IN | WEIGHT: 136 LBS

## 2024-11-25 DIAGNOSIS — I47.10 SUSTAINED SVT: Primary | ICD-10-CM

## 2024-11-25 PROCEDURE — 93000 ELECTROCARDIOGRAM COMPLETE: CPT | Performed by: FAMILY MEDICINE

## 2024-11-25 PROCEDURE — 99212 OFFICE O/P EST SF 10 MIN: CPT | Performed by: FAMILY MEDICINE

## 2024-11-25 PROCEDURE — 1170F FXNL STATUS ASSESSED: CPT | Performed by: FAMILY MEDICINE

## 2024-11-25 PROCEDURE — 1126F AMNT PAIN NOTED NONE PRSNT: CPT | Performed by: FAMILY MEDICINE

## 2024-11-25 NOTE — PROGRESS NOTES
Assessment & Plan  Sustained SVT    Orders:    ECG 12 Lead    No follow-ups on file.  Patient was given instructions and counseling regarding her condition or for health maintenance advice. Please see specific information pulled into the AVS if appropriate.          Lupe is a 74 y.o. being seen today for  Annual Exam   HISTORY    HPI Patient was scheduled for her intro to Medicare however her heart rate was 891 with ST depression.  She has a very long history of SVT and last saw her cardiologist, or at least his physician assistant, in January.  It has become more frequent but she has not contacted her cardiologist.  She has not been able to find a reliable way to convert herself out of SVT.  Her oxygen level is normally up about 91-92 and it was at that level here.  We were unable to get a blood pressure more due to logistics and that there was a problem with that.  EMS was called and she was transported to [facility].    Social History  She  reports that she quit smoking about 44 years ago. Her smoking use included cigarettes. She started smoking about 54 years ago. She has a 5 pack-year smoking history. She has never used smokeless tobacco. She reports that she does not currently use alcohol. She reports that she does not use drugs.  EXAM DATA    Vital Signs        BP Readings from Last 1 Encounters:   11/09/23 (!) 168/102     Wt Readings from Last 3 Encounters:   11/25/24 61.7 kg (136 lb)   11/09/23 64.9 kg (143 lb)   06/23/23 71.7 kg (158 lb)   Body mass index is 27.47 kg/m².  Physical Exam       ECG 12 Lead    Date/Time: 11/25/2024 10:33 AM  Performed by: Kiley Wei MD    Authorized by: Kiley Wei MD  Comparison: not compared with previous ECG   Rhythm: supraventricular tachycardia  Rate: tachycardic    Clinical impression: abnormal EKG

## 2024-11-29 DIAGNOSIS — E03.8 ADULT ONSET HYPOTHYROIDISM: ICD-10-CM

## 2024-12-02 RX ORDER — LEVOTHYROXINE SODIUM 75 UG/1
75 TABLET ORAL DAILY
Qty: 10 TABLET | Refills: 0 | Status: SHIPPED | OUTPATIENT
Start: 2024-12-02

## 2024-12-10 ENCOUNTER — TELEPHONE (OUTPATIENT)
Dept: FAMILY MEDICINE CLINIC | Facility: CLINIC | Age: 74
End: 2024-12-10
Payer: MEDICARE

## 2024-12-10 NOTE — TELEPHONE ENCOUNTER
There is a note for patient to schedule labs for thyroid.  Patient is schedule for Friday, but needs lab order place.

## 2024-12-13 DIAGNOSIS — E03.8 ADULT ONSET HYPOTHYROIDISM: Primary | ICD-10-CM

## 2024-12-14 LAB — TSH SERPL DL<=0.005 MIU/L-ACNC: 3.93 UIU/ML (ref 0.27–4.2)

## 2024-12-16 DIAGNOSIS — E03.8 ADULT ONSET HYPOTHYROIDISM: ICD-10-CM

## 2024-12-16 RX ORDER — LEVOTHYROXINE SODIUM 75 UG/1
75 TABLET ORAL DAILY
Qty: 90 TABLET | Refills: 3 | Status: SHIPPED | OUTPATIENT
Start: 2024-12-16

## 2025-01-23 ENCOUNTER — TELEPHONE (OUTPATIENT)
Dept: FAMILY MEDICINE CLINIC | Facility: CLINIC | Age: 75
End: 2025-01-23
Payer: MEDICARE

## 2025-01-23 DIAGNOSIS — Z12.31 ENCOUNTER FOR SCREENING MAMMOGRAM FOR MALIGNANT NEOPLASM OF BREAST: Primary | ICD-10-CM

## 2025-01-23 NOTE — TELEPHONE ENCOUNTER
Caller: Lupe Becker    Relationship: Self    Best call back number: [phone]     What orders are you requesting (i.e. lab or imaging): MAMMOGRAM ORDERS ;     In what timeframe would the patient need to come in: ASAP    Where will you receive your lab/imaging services:  [facility]    Additional notes: PLEASE CALL PATIENT ONCE ORDER IS IN

## 2025-02-20 DIAGNOSIS — Z12.31 ENCOUNTER FOR SCREENING MAMMOGRAM FOR MALIGNANT NEOPLASM OF BREAST: ICD-10-CM

## 2025-03-22 DIAGNOSIS — E03.8 ADULT ONSET HYPOTHYROIDISM: ICD-10-CM

## 2025-03-24 RX ORDER — LEVOTHYROXINE SODIUM 75 UG/1
75 TABLET ORAL DAILY
Qty: 90 TABLET | Refills: 1 | Status: SHIPPED | OUTPATIENT
Start: 2025-03-24

## 2025-07-01 NOTE — PROGRESS NOTES
Assessment & Plan  PAT  Reports experiencing arrhythmias and episodes of tachycardia. Previously on metoprolol 25 mg and diltiazem, which may have contributed to her symptoms. Premature atrial contractions noted during a [employer] review. Advised to continue monitoring heart rate and report any significant changes.    Nervousness/panic.  Experiences significant nervousness and shaking during medical visits and other stressful situations. Elevated blood glucose levels could be due to stress. Prescription for Xanax (alprazolam) will be provided to take as needed for acute anxiety episodes. Advised not to use it regularly to avoid dependency and respiratory depression.    Health maintenance.  Due for a wellness visit and advised to schedule an appointment with Dr. Helton. Does not require a Pap smear at her age. Not a candidate for virtual colorectal screening due to history of a premalignant polyp in the transverse colon. Advised to undergo a colonoscopy with light anesthesia.        Benign essential hypertension  BP is controlled well. She will recheck in six months. She will continue present meds. Prescription will be sent when notified by pharmacy..             Patient was given instructions and counseling regarding her condition or for health maintenance advice. Please see specific information pulled into the AVS if appropriate.          Lupe is a 74 y.o. being seen today for  Hypertension   HISTORY    Shortness of Breath  Chronicity:  Chronic  Onset:  More than 1 year ago  Frequency:  Intermittently  Progression since onset:  Coming and going  Fever:  No fever  Associated symptoms: chest congestion and orthopnea    Aggravating factors:  Emotional upset, smoke, fumes and pollens  PMH Includes: no asthma    Recent oxygen %:  90  Additional Information:  If I breathe deep it can get to 93 or 94 without the deep breathing 88-90            She has been using [employer] for the past 3 years to monitor her  heart rate, which she finds beneficial. She experiences anxiety during doctor visits, fearing potential cardiac events that might necessitate hospitalization. She was prescribed diltiazem by Dr. Frias in 01/2024, which led to a reduction in her metoprolol dosage to 25 mg. She read that metoprolol can cause tachycardia when combined with diltiazem. Dr. Frias was hesitant about prescribing metoprolol due to concerns it might lower her heart rate too much, but he ultimately decided to keep the dosage at 25 mg. She experienced arrhythmias and tachycardia, prompting her to stop taking metoprolol for 3 days. During this time, she did not experience any increase in blood pressure or pulse rate. A clinician review identified premature atrial contractions as the trigger for these episodes. She was informed that another ablation could be performed if necessary, but she is apprehensive about the procedure. She feels most comfortable when wearing pants and tennis shoes. She monitors her oxygen levels and heart rate at home using a concentrator.    She reports feeling nervous and shaky when expecting visitors. Her blood work from the hospital showed elevated blood sugar levels, which she attributes to consuming a candy bar and stress. She has previously taken sertraline and lorazepam injections. She was on Valium 5 mg daily for 10 years under the care of her previous cardiologist.    She is reluctant to undergo anesthesia for colorectal screening, even though she has a history of a premalignant polyp in the transverse colon.    FAMILY HISTORY  Her father had colorectal cancer that metastasized to his esophagus.     Social History  She  reports that she quit smoking about 45 years ago. Her smoking use included cigarettes. She started smoking about 55 years ago. She has a 5 pack-year smoking history. She has never used smokeless tobacco. She reports that she does not currently use alcohol. She reports that she does not use  drugs.  EXAM DATA    Vital Signs        BP Readings from Last 1 Encounters:   07/02/25 138/88     Wt Readings from Last 3 Encounters:   07/02/25 62.6 kg (138 lb)   11/25/24 61.7 kg (136 lb)   11/09/23 64.9 kg (143 lb)   Body mass index is 27.87 kg/m².  Physical Exam      General Appearance: Normal.  Respiratory: Within normal limits.  Cardiovascular: Heart rate is 78.  Skin: Warm and dry, no rash.  Psychiatric: Normal judgement and affect.            Patient or patient representative verbalized consent for the use of Ambient Listening during the visit with  Kiley Wei MD for chart documentation. 7/2/2025  12:10 EDT

## 2025-07-02 ENCOUNTER — OFFICE VISIT (OUTPATIENT)
Dept: FAMILY MEDICINE CLINIC | Facility: CLINIC | Age: 75
End: 2025-07-02
Payer: MEDICARE

## 2025-07-02 VITALS
WEIGHT: 138 LBS | DIASTOLIC BLOOD PRESSURE: 88 MMHG | SYSTOLIC BLOOD PRESSURE: 138 MMHG | HEIGHT: 59 IN | HEART RATE: 78 BPM | BODY MASS INDEX: 27.82 KG/M2 | OXYGEN SATURATION: 98 % | RESPIRATION RATE: 18 BRPM

## 2025-07-02 DIAGNOSIS — F41.0 PANIC: Primary | ICD-10-CM

## 2025-07-02 DIAGNOSIS — I47.10 PAROXYSMAL SVT (SUPRAVENTRICULAR TACHYCARDIA): ICD-10-CM

## 2025-07-02 DIAGNOSIS — I10 BENIGN ESSENTIAL HYPERTENSION: ICD-10-CM

## 2025-07-02 PROCEDURE — 1126F AMNT PAIN NOTED NONE PRSNT: CPT | Performed by: FAMILY MEDICINE

## 2025-07-02 PROCEDURE — 1159F MED LIST DOCD IN RCRD: CPT | Performed by: FAMILY MEDICINE

## 2025-07-02 PROCEDURE — 1160F RVW MEDS BY RX/DR IN RCRD: CPT | Performed by: FAMILY MEDICINE

## 2025-07-02 PROCEDURE — 99214 OFFICE O/P EST MOD 30 MIN: CPT | Performed by: FAMILY MEDICINE

## 2025-07-02 PROCEDURE — 3079F DIAST BP 80-89 MM HG: CPT | Performed by: FAMILY MEDICINE

## 2025-07-02 PROCEDURE — 3075F SYST BP GE 130 - 139MM HG: CPT | Performed by: FAMILY MEDICINE

## 2025-07-02 PROCEDURE — G2211 COMPLEX E/M VISIT ADD ON: HCPCS | Performed by: FAMILY MEDICINE

## 2025-07-02 RX ORDER — LORAZEPAM 0.5 MG/1
0.5 TABLET ORAL EVERY 8 HOURS PRN
Qty: 25 TABLET | Refills: 0 | Status: SHIPPED | OUTPATIENT
Start: 2025-07-02

## 2025-07-17 DIAGNOSIS — F41.0 PANIC: ICD-10-CM

## 2025-07-17 RX ORDER — LORAZEPAM 0.5 MG/1
0.5 TABLET ORAL EVERY 8 HOURS PRN
Qty: 25 TABLET | Refills: 0 | Status: SHIPPED | OUTPATIENT
Start: 2025-07-17

## 2025-07-17 NOTE — TELEPHONE ENCOUNTER
My note says that she was told to continue her present medications. I expected her to discuss the metoprolol with the cardiologist who gave it to her. I do not want to refill it.    I did not realize that she was taking the lorazepam twice daily. That is a LOT of lorazepam. The bottle should have said three times a day AS NEEDED not routinely. I expected her to use them occasionally not all the time. Lorazepam is a dangerous drug to take every day.   [facility]

## 2025-07-17 NOTE — TELEPHONE ENCOUNTER
Patient informed will get Metoprolol from cardiologist and will only take one of the lorazepam as needed going forward

## 2025-07-17 NOTE — TELEPHONE ENCOUNTER
Last visit 7/2/25  Next visit 10/8/25 with Dr. Helton    You havent given her the Metoprolol before

## 2025-08-14 DIAGNOSIS — F41.0 PANIC: ICD-10-CM

## 2025-08-15 RX ORDER — LORAZEPAM 0.5 MG/1
0.5 TABLET ORAL EVERY 8 HOURS PRN
Qty: 25 TABLET | Refills: 0 | Status: SHIPPED | OUTPATIENT
Start: 2025-08-15

## (undated) DEVICE — CANN NASL CO2 TRULINK W/O2 A/

## (undated) DEVICE — THE TORRENT IRRIGATION SCOPE CONNECTOR IS USED WITH THE TORRENT IRRIGATION TUBING TO PROVIDE IRRIGATION FLUIDS SUCH AS STERILE WATER DURING GASTROINTESTINAL ENDOSCOPIC PROCEDURES WHEN USED IN CONJUNCTION WITH AN IRRIGATION PUMP (OR ELECTROSURGICAL UNIT).: Brand: TORRENT

## (undated) DEVICE — TUBING, SUCTION, 1/4" X 10', STRAIGHT: Brand: MEDLINE

## (undated) DEVICE — Device: Brand: DEFENDO AIR/WATER/SUCTION AND BIOPSY VALVE

## (undated) DEVICE — SNAR POLYP SENSATION STDOVL 27 240 BX40

## (undated) DEVICE — THE SINGLE USE ETRAP – POLYP TRAP IS USED FOR SUCTION RETRIEVAL OF ENDOSCOPICALLY REMOVED POLYPS.: Brand: ETRAP

## (undated) DEVICE — TBG 02 CRUSH RESIST LF CLR 7FT